# Patient Record
Sex: FEMALE | Race: WHITE | NOT HISPANIC OR LATINO | Employment: FULL TIME | URBAN - METROPOLITAN AREA
[De-identification: names, ages, dates, MRNs, and addresses within clinical notes are randomized per-mention and may not be internally consistent; named-entity substitution may affect disease eponyms.]

---

## 2018-07-17 ENCOUNTER — OFFICE VISIT (OUTPATIENT)
Dept: NEUROLOGY | Facility: CLINIC | Age: 58
End: 2018-07-17
Payer: COMMERCIAL

## 2018-07-17 ENCOUNTER — LAB VISIT (OUTPATIENT)
Dept: LAB | Facility: HOSPITAL | Age: 58
End: 2018-07-17
Attending: PSYCHIATRY & NEUROLOGY
Payer: COMMERCIAL

## 2018-07-17 VITALS
HEIGHT: 64 IN | SYSTOLIC BLOOD PRESSURE: 121 MMHG | WEIGHT: 127.44 LBS | DIASTOLIC BLOOD PRESSURE: 75 MMHG | HEART RATE: 69 BPM | BODY MASS INDEX: 21.76 KG/M2

## 2018-07-17 DIAGNOSIS — R20.0 NUMBNESS IN FEET: ICD-10-CM

## 2018-07-17 DIAGNOSIS — M79.2 PERIPHERAL NEUROPATHIC PAIN: ICD-10-CM

## 2018-07-17 DIAGNOSIS — M79.2 PERIPHERAL NEUROPATHIC PAIN: Primary | ICD-10-CM

## 2018-07-17 LAB
25(OH)D3+25(OH)D2 SERPL-MCNC: 38 NG/ML
CERULOPLASMIN SERPL-MCNC: 28 MG/DL
CRP SERPL-MCNC: 0.5 MG/L
ERYTHROCYTE [SEDIMENTATION RATE] IN BLOOD BY WESTERGREN METHOD: 13 MM/HR
FOLATE SERPL-MCNC: 16.5 NG/ML
HCYS SERPL-SCNC: 5.8 UMOL/L
T4 FREE SERPL-MCNC: 0.98 NG/DL
TSH SERPL DL<=0.005 MIU/L-ACNC: 0.94 UIU/ML

## 2018-07-17 PROCEDURE — 86618 LYME DISEASE ANTIBODY: CPT

## 2018-07-17 PROCEDURE — 84630 ASSAY OF ZINC: CPT

## 2018-07-17 PROCEDURE — 86334 IMMUNOFIX E-PHORESIS SERUM: CPT | Mod: 26,,, | Performed by: PATHOLOGY

## 2018-07-17 PROCEDURE — 84439 ASSAY OF FREE THYROXINE: CPT

## 2018-07-17 PROCEDURE — 85651 RBC SED RATE NONAUTOMATED: CPT

## 2018-07-17 PROCEDURE — 84255 ASSAY OF SELENIUM: CPT

## 2018-07-17 PROCEDURE — 82746 ASSAY OF FOLIC ACID SERUM: CPT

## 2018-07-17 PROCEDURE — 82525 ASSAY OF COPPER: CPT

## 2018-07-17 PROCEDURE — 84590 ASSAY OF VITAMIN A: CPT

## 2018-07-17 PROCEDURE — 84425 ASSAY OF VITAMIN B-1: CPT

## 2018-07-17 PROCEDURE — 84207 ASSAY OF VITAMIN B-6: CPT

## 2018-07-17 PROCEDURE — 84252 ASSAY OF VITAMIN B-2: CPT

## 2018-07-17 PROCEDURE — 99999 PR PBB SHADOW E&M-NEW PATIENT-LVL II: CPT | Mod: PBBFAC,,, | Performed by: PSYCHIATRY & NEUROLOGY

## 2018-07-17 PROCEDURE — 82607 VITAMIN B-12: CPT

## 2018-07-17 PROCEDURE — 86140 C-REACTIVE PROTEIN: CPT

## 2018-07-17 PROCEDURE — 36415 COLL VENOUS BLD VENIPUNCTURE: CPT

## 2018-07-17 PROCEDURE — 86592 SYPHILIS TEST NON-TREP QUAL: CPT

## 2018-07-17 PROCEDURE — 86334 IMMUNOFIX E-PHORESIS SERUM: CPT

## 2018-07-17 PROCEDURE — 86038 ANTINUCLEAR ANTIBODIES: CPT

## 2018-07-17 PROCEDURE — 82390 ASSAY OF CERULOPLASMIN: CPT

## 2018-07-17 PROCEDURE — 99205 OFFICE O/P NEW HI 60 MIN: CPT | Mod: S$GLB,,, | Performed by: PSYCHIATRY & NEUROLOGY

## 2018-07-17 PROCEDURE — 84165 PROTEIN E-PHORESIS SERUM: CPT | Mod: 26,,, | Performed by: PATHOLOGY

## 2018-07-17 PROCEDURE — 3008F BODY MASS INDEX DOCD: CPT | Mod: CPTII,S$GLB,, | Performed by: PSYCHIATRY & NEUROLOGY

## 2018-07-17 PROCEDURE — 84446 ASSAY OF VITAMIN E: CPT

## 2018-07-17 PROCEDURE — 84165 PROTEIN E-PHORESIS SERUM: CPT

## 2018-07-17 PROCEDURE — 83090 ASSAY OF HOMOCYSTEINE: CPT

## 2018-07-17 PROCEDURE — 82306 VITAMIN D 25 HYDROXY: CPT

## 2018-07-17 PROCEDURE — 84443 ASSAY THYROID STIM HORMONE: CPT

## 2018-07-17 RX ORDER — TEMAZEPAM 15 MG/1
15 CAPSULE ORAL NIGHTLY PRN
Refills: 2 | COMMUNITY
Start: 2018-05-20

## 2018-07-17 RX ORDER — CALCIUM CARBONATE 500(1250)
1 TABLET ORAL DAILY
COMMUNITY

## 2018-07-17 RX ORDER — ESTRADIOL 0.04 MG/D
1 FILM, EXTENDED RELEASE TRANSDERMAL
COMMUNITY
Start: 2018-04-28

## 2018-07-17 RX ORDER — AMOXICILLIN 500 MG
CAPSULE ORAL DAILY
COMMUNITY

## 2018-07-17 RX ORDER — MEDROXYPROGESTERONE ACETATE 5 MG/1
TABLET ORAL DAILY
COMMUNITY
Start: 2018-04-24

## 2018-07-17 RX ORDER — CHOLECALCIFEROL (VITAMIN D3) 25 MCG
1000 TABLET ORAL DAILY
COMMUNITY

## 2018-07-17 RX ORDER — PRAVASTATIN SODIUM 10 MG/1
10 TABLET ORAL
COMMUNITY
Start: 2018-04-23

## 2018-07-17 NOTE — PROGRESS NOTES
"NEUROLOGY CONSULT:  Referring provider: Self, Aaareferral    Date of service: 7/17/2018   11:22 AM   Patient name: Sincere Riggins  Patient MRN: [id number]    Chief Complaint/Reason for Consultation: numbness in feet      History of Present Illness:   Sincere Riggins is a 57 y.o. with HLD, insomnia, and peripheral neuropathy who is here for treatment and recommendations.  Developed numbness in feet 10 years ago. Initially told that she needed shoe inserts, which helped. Then about 6 years ago, she was told to see a podiatrist, who ordered EMG which demonstrated bilateral LE moderate peripheral polyneuropathy of a mixed motor and sensory type (by Dr. Adams of [facility], 7/2012) and rx'd GBP, which helped with abnormal sensation in toes which "felt like marbles."  Unfortunately, the abnormal sensations seemed to get worse over time in terms of sensation intensity but not in terms of location (still in feet). While sitting her feet would just go numb, thought it was due to compression injury when stitting, but then she noted numbness when standing. Feels like there's a little stocking on her feet to ankles. When walking, feels like walking on crates; and when anything scrapes top of her feet, she feels a painful abnormal tingling sensation.  No prior comprehensive w/u for peripheral neuropathy.   No similar sensations in hands/arms. When exercising or washing hair in shower, she has some proximal arm pain.  No weakness. No tripping/fall/difficulty climbing stairs.   No difficulty with breathing. Sometimes when swallowing, water>pills>food will get stuck. Will stop breathing, then 30 sec, will go down. More in last 6 months but not constant or consistent.   No B/B issues, including increased frequency.  Some insomnia after menopause, melatonin helps.    Review of Systems   All other systems reviewed and are negative.      PMH: HLD, insomnia, peripheral neuropathy    No past surgical history on file.    Current Outpatient " "Prescriptions on File Prior to Visit   Medication Sig Dispense Refill Last Dose    gabapentin (NEURONTIN) 300 MG capsule 2 daily 180 capsule 3 Taking     Also taking: MV, Calcium, CoQ10, brain health, fish oil, "heart vitamin",    Allergies:   Review of patient's allergies indicates:  No Known Allergies    Family History:   Mother: AD  Father: diabetes    Social history:   Occupation: administrative position at [employer]  Education: college  Tobacco: never  Alcohol: never  Illicit substances: denies  Living situation: , 3 children  Disabilities (ADL/IADL):       Examination:    /75   Pulse 69   Ht 5' 4" (1.626 m)   Wt 57.8 kg (127 lb 6.8 oz)   BMI 21.87 kg/m²   .  GENERAL: pleasant, NAD, WDWN. Appropriate mood and affect.   Mental Status: Awake, alert, fully oriented. Patient is a good historian and follows examination well. Normal language function. No neglect.   CRANIAL NERVES:  II: PERRLA. VFFTC.    III, IV, VI: Gaze conjugate, EOMI  V: Sensation intact and symmetric to light touch V1-V3  VII: Symmetric facial motor function bilaterally  VIII: Intact to finger rub bilaterally  IX: Palate elevates symmetrically  X: Normal cough  XI: Normal shoulder shrug bilaterally  XII: Tongue protrudes midline  MOTOR: No drift. Normal tone and bulk.   ? Delt Bi Tri WE WF FDI HF KE KF ADF APF   Right 5 5 5 5 5 5 5 5 5 5 5   Left 5 5 5 5 5 5 5 5 5 5 5   SENSATION:  Temp/Pinprick: decreased beyond distal 1/3 shin, worse below ankles  Vibration: decreased to ankles  Proprioception: Intact and symmetric in the bilateral upper and lower extremities  Romberg negative.  COORDINATION:  Finger to Nose Intact Bilaterally  Rapid alternating movements intact bilaterally  Heel to Shin intact bilaterally  DTRs:  ? Biceps Triceps Brachioradialis Knee Ankle   Right 2+ 2+ 2+ 2+ 1+   Left 2+ 2+ 2+ 2+ 1+   Plantar reflex downgoing bilaterally  Franco absent bilaterally  GAIT: normal base and stride. Normal " tandem. Able to walk on toes. Some difficulty with heel walk.    Data:   Laboratory: none available    CSF: n/a    Imaging: none available    Assessment: 57 y.o. with HLD, insomnia, and peripheral neuropathy of who is here for treatment and recommendations of worsening peripheral neuropathy. Unclear etiology at this time. Denies DM or signs thereof. Not sure of exact testing performed in past. Exam is concerning for a length-dependent loss in b/l LE.    Plan:   - peripheral neuropathy panel: spep/viola, homocysteine, zinc, vit E/B6/D/B2/B12/B1/A, selenium, folate, crp, esr, wayne, rpr, tsh, free t4, copper, ceruloplasmin, lyme  - discussed treatment options, including gabapentin, which she has had in past. Will hold on further dosing at this time until lab results return. Discussed that this will not hinder diagnosis.     RTC in 2 weeks      Thank you for allowing me to participate in the care of Sincere Riggins.    I spent more than 60 minutes with the patient during the visit.  More than half of the visit was spent counseling the patient about possible diagnosis, further w/u, and treatment..

## 2018-07-18 ENCOUNTER — DOCUMENTATION ONLY (OUTPATIENT)
Dept: NEUROLOGY | Facility: CLINIC | Age: 58
End: 2018-07-18

## 2018-07-18 LAB
ALBUMIN SERPL ELPH-MCNC: 4.55 G/DL
ALPHA1 GLOB SERPL ELPH-MCNC: 0.29 G/DL
ALPHA2 GLOB SERPL ELPH-MCNC: 0.7 G/DL
ANA SER QL IF: NORMAL
B-GLOBULIN SERPL ELPH-MCNC: 0.69 G/DL
GAMMA GLOB SERPL ELPH-MCNC: 0.78 G/DL
INTERPRETATION SERPL IFE-IMP: NORMAL
PATHOLOGIST INTERPRETATION IFE: NORMAL
PATHOLOGIST INTERPRETATION SPE: NORMAL
PROT SERPL-MCNC: 7 G/DL
RPR SER QL: NORMAL
VIT B12 SERPL-MCNC: 704 NG/L

## 2018-07-18 NOTE — PROGRESS NOTES
Fax received from [facility]     NCV Test; Placed in SS folder for review.

## 2018-07-19 LAB
B BURGDOR AB SER IA-ACNC: 0.02 INDEX VALUE
COPPER SERPL-MCNC: 1336 UG/L (ref 810–1990)
PYRIDOXAL SERPL-MCNC: 25 UG/L (ref 5–50)
ZINC SERPL-MCNC: 99 UG/DL (ref 60–130)

## 2018-07-20 LAB
A-TOCOPHEROL VIT E SERPL-MCNC: 1550 UG/DL (ref 500–1800)
SELENIUM SERPL-MCNC: 103 UG/L (ref 23–190)
VIT A SERPL-MCNC: 48 UG/DL (ref 38–106)
VIT B1 SERPL-MCNC: 62 UG/L (ref 38–122)
VIT B2 SERPL-MCNC: 7 MCG/L (ref 1–19)

## 2018-07-24 PROBLEM — E78.49 OTHER HYPERLIPIDEMIA: Status: ACTIVE | Noted: 2018-07-24

## 2018-08-10 ENCOUNTER — OFFICE VISIT (OUTPATIENT)
Dept: NEUROLOGY | Facility: CLINIC | Age: 58
End: 2018-08-10
Payer: COMMERCIAL

## 2018-08-10 VITALS
HEIGHT: 64 IN | DIASTOLIC BLOOD PRESSURE: 71 MMHG | BODY MASS INDEX: 22.15 KG/M2 | HEART RATE: 68 BPM | SYSTOLIC BLOOD PRESSURE: 117 MMHG | WEIGHT: 129.75 LBS

## 2018-08-10 DIAGNOSIS — R20.0 NUMBNESS AND TINGLING OF BOTH FEET: ICD-10-CM

## 2018-08-10 DIAGNOSIS — R20.2 NUMBNESS AND TINGLING IN BOTH HANDS: Primary | ICD-10-CM

## 2018-08-10 DIAGNOSIS — R20.2 NUMBNESS AND TINGLING OF BOTH FEET: ICD-10-CM

## 2018-08-10 DIAGNOSIS — R29.818 FOCAL NEUROLOGICAL DEFICIT: ICD-10-CM

## 2018-08-10 DIAGNOSIS — R20.0 NUMBNESS AND TINGLING IN BOTH HANDS: Primary | ICD-10-CM

## 2018-08-10 PROCEDURE — 3008F BODY MASS INDEX DOCD: CPT | Mod: CPTII,S$GLB,, | Performed by: PSYCHIATRY & NEUROLOGY

## 2018-08-10 PROCEDURE — 99213 OFFICE O/P EST LOW 20 MIN: CPT | Mod: S$GLB,,, | Performed by: PSYCHIATRY & NEUROLOGY

## 2018-08-10 PROCEDURE — 99999 PR PBB SHADOW E&M-EST. PATIENT-LVL III: CPT | Mod: PBBFAC,,, | Performed by: PSYCHIATRY & NEUROLOGY

## 2018-08-10 NOTE — PROGRESS NOTES
"Subjective:       Patient ID: Sincere Riggins is a 57 y.o. female who presents today for a routine clinic visit for neuropathy..      HPI, taken 7/17/18:  57 y.o. with HLD, insomnia, and peripheral neuropathy who is here for treatment and recommendations.  Developed numbness in feet 10 years ago. Initially told that she needed shoe inserts, which helped. Then about 6 years ago, she was told to see a podiatrist, who ordered EMG which demonstrated bilateral LE moderate peripheral polyneuropathy of a mixed motor and sensory type (by Dr. Adams of [facility], 7/2012) and rx'd GBP, which helped with abnormal sensation in toes which "felt like marbles."  Unfortunately, the abnormal sensations seemed to get worse over time in terms of sensation intensity but not in terms of location (still in feet). While sitting her feet would just go numb, thought it was due to compression injury when stitting, but then she noted numbness when standing. Feels like there's a little stocking on her feet to ankles. When walking, feels like walking on crates; and when anything scrapes top of her feet, she feels a painful abnormal tingling sensation.  No prior comprehensive w/u for peripheral neuropathy.   No similar sensations in hands/arms. When exercising or washing hair in shower, she has some proximal arm pain.  No weakness. No tripping/fall/difficulty climbing stairs.   No difficulty with breathing. Sometimes when swallowing, water>pills>food will get stuck. Will stop breathing, then 30 sec, will go down. More in last 6 months but not constant or consistent.   No B/B issues, including increased frequency.  Some insomnia after menopause, melatonin helps.    SUBJECTIVE/TODAY:  · Symptoms still come and go. Worse on left than right. Feet are worse in the evening. GBP helps, 300 mg bid. No other particular triggers.  · Still taking vit D 1000 units daily  · Discussed results of tests so far.    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   Taking    estradiol (VIVELLE-DOT) 0.0375 mg/24 hr    Taking    fish oil-omega-3 fatty acids 300-1,000 mg capsule Take by mouth once daily.   Taking    gabapentin (NEURONTIN) 300 MG capsule 2 daily 180 capsule 3 Taking    medroxyPROGESTERone (PROVERA) 5 MG tablet    Taking    multivitamin capsule Take 1 capsule by mouth once daily.   Taking    pravastatin (PRAVACHOL) 10 MG tablet    Taking    temazepam (RESTORIL) 15 mg Cap Take 15 mg by mouth nightly.  2 Taking    vitamin D 1000 units Tab Take 1,000 Units by mouth once daily.   Taking             Objective:        Neurologic Exam      MENTAL STATUS: grossly intact. Normal language, attention, and memory.   CRANIAL NERVE EXAM: Extraocular muscles are intact.  No facial asymmetry. tongue midline. Shoulder shrug normal b/l There is no dysarthria.   MOTOR EXAM: Strength is 5/5 in all groups in the lower extremities and upper extremities.   SENSORY EXAM: LT normal in BUE. Decreased in LE's from distal 1/3 shin downward  COORDINATION: No ataxia  GAIT: Narrow based and stable. Very cautious, wearing wedges      Imaging:     None       Labs:       No results found for: WBC, RBC, HGB, HCT, MCV, MCH, MCHC, RDW, PLT, MPV, GRAN, LYMPH, MONO, EOS, BASO, EOSINOPHIL, BASOPHIL    Chemistry    No results found for: NA, K, CL, CO2, BUN, CREATININE, GLU No results found for: CALCIUM, ALKPHOS, AST, ALT, BILITOT, ESTGFRAFRICA, EGFRNONAA     7/2018: negative/normal - spep/viola, homocysteine, zinc, vit E/B6/D/B2/B12/B1/A, selenium, folate, crp, esr, wayne, rpr, tsh, free t4, copper,  ceruloplasmin, lyme      Diagnosis/Assessment/Plan:    1. Paresthesias, symptoms wax and wane  · Assessment: previously diagnosed with peripheral neuropathy, but would like to eval for central causes given patient's report of symptoms waxing and waning, worse after exertion.  · Imaging: MRI brain and c-spine  · Labs: no further labs  · Consults None  · Medications None, can continue gabapentin and vit D.      RTC with me in 2 weeks    Over 50% of this 40 minute visit was spent in direct face to face counseling of the patient about diagnosis, further w/u, and possible symptom management.         There are no diagnoses linked to this encounter.

## 2018-09-06 ENCOUNTER — TELEPHONE (OUTPATIENT)
Dept: NEUROLOGY | Facility: CLINIC | Age: 58
End: 2018-09-06

## 2018-09-06 DIAGNOSIS — R20.0 NUMBNESS AND TINGLING OF BOTH FEET: ICD-10-CM

## 2018-09-06 DIAGNOSIS — R29.818 FOCAL NEUROLOGICAL DEFICIT: ICD-10-CM

## 2018-09-06 DIAGNOSIS — R20.0 NUMBNESS IN BOTH HANDS: Primary | ICD-10-CM

## 2018-09-06 DIAGNOSIS — R20.2 NUMBNESS AND TINGLING OF BOTH FEET: ICD-10-CM

## 2018-09-06 NOTE — TELEPHONE ENCOUNTER
Placed call to pt. She is unable to have MRI's at [facility] due to financial concerns. Pt requested orders be faxed to [facility] at .

## 2018-09-06 NOTE — TELEPHONE ENCOUNTER
----- Message from Jay Le sent at 9/6/2018  4:13 PM CDT -----  Contact: bcbs-   Needs Advice    Reason for call:     Would like a call regarding patient MRI, need orders faxed. States its urgent, her pre op auth need to be completed before next Tuesday.   Communication Preference: [phone]   Additional Information: fax

## 2018-09-12 ENCOUNTER — DOCUMENTATION ONLY (OUTPATIENT)
Dept: NEUROLOGY | Facility: CLINIC | Age: 58
End: 2018-09-12

## 2018-09-12 NOTE — PROGRESS NOTES
Fax received from [facility] with c spine and brain MRI reports (done 9/12/18). Placed in Dr Ruth' folder for review.

## 2018-09-13 NOTE — TELEPHONE ENCOUNTER
Called and spoke to patient about MRI results.    MRI cervical spine: C5/6 central disc herniation. C6/7 central and left lateral disc herniation with chronic cord compression. Bilateral stenosis with compromise of the C7 nerve roots, more prominent on the right than the left.    MRI Brain: normal study    Patient to make an appt online for next week.

## 2018-09-21 ENCOUNTER — OFFICE VISIT (OUTPATIENT)
Dept: NEUROLOGY | Facility: CLINIC | Age: 58
End: 2018-09-21
Payer: COMMERCIAL

## 2018-09-21 VITALS
SYSTOLIC BLOOD PRESSURE: 124 MMHG | DIASTOLIC BLOOD PRESSURE: 76 MMHG | WEIGHT: 128.88 LBS | HEART RATE: 66 BPM | BODY MASS INDEX: 22 KG/M2 | HEIGHT: 64 IN

## 2018-09-21 DIAGNOSIS — R20.0 NUMBNESS IN FEET: ICD-10-CM

## 2018-09-21 DIAGNOSIS — M79.2 PERIPHERAL NEUROPATHIC PAIN: Primary | ICD-10-CM

## 2018-09-21 DIAGNOSIS — M50.30 DDD (DEGENERATIVE DISC DISEASE), CERVICAL: ICD-10-CM

## 2018-09-21 DIAGNOSIS — M47.812 OSTEOARTHRITIS OF CERVICAL SPINE, UNSPECIFIED SPINAL OSTEOARTHRITIS COMPLICATION STATUS: ICD-10-CM

## 2018-09-21 PROCEDURE — 3008F BODY MASS INDEX DOCD: CPT | Mod: CPTII,S$GLB,, | Performed by: PSYCHIATRY & NEUROLOGY

## 2018-09-21 PROCEDURE — 99213 OFFICE O/P EST LOW 20 MIN: CPT | Mod: S$GLB,,, | Performed by: PSYCHIATRY & NEUROLOGY

## 2018-09-21 PROCEDURE — 99999 PR PBB SHADOW E&M-EST. PATIENT-LVL III: CPT | Mod: PBBFAC,,, | Performed by: PSYCHIATRY & NEUROLOGY

## 2018-09-21 NOTE — PROGRESS NOTES
"Subjective:       Patient ID: Sincere Riggins is a 58 y.o. female who presents today for a routine clinic visit for neuropathy.    HPI, taken 7/17/18:  57 y.o. with HLD, insomnia, and peripheral neuropathy who is here for treatment and recommendations.  Developed numbness in feet 10 years ago. Initially told that she needed shoe inserts, which helped. Then about 6 years ago, she was told to see a podiatrist, who ordered EMG which demonstrated bilateral LE moderate peripheral polyneuropathy of a mixed motor and sensory type (by Dr. Adams of [facility], 7/2012) and rx'd GBP, which helped with abnormal sensation in toes which "felt like marbles."  Unfortunately, the abnormal sensations seemed to get worse over time in terms of sensation intensity but not in terms of location (still in feet). While sitting her feet would just go numb, thought it was due to compression injury when stitting, but then she noted numbness when standing. Feels like there's a little stocking on her feet to ankles. When walking, feels like walking on crates; and when anything scrapes top of her feet, she feels a painful abnormal tingling sensation.  No prior comprehensive w/u for peripheral neuropathy.   No similar sensations in hands/arms. When exercising or washing hair in shower, she has some proximal arm pain.  No weakness. No tripping/fall/difficulty climbing stairs.   No difficulty with breathing. Sometimes when swallowing, water>pills>food will get stuck. Will stop breathing, then 30 sec, will go down. More in last 6 months but not constant or consistent.   No B/B issues, including increased frequency.  Some insomnia after menopause, melatonin helps.    SUBJECTIVE/TODAY:  · Symptoms still come and go. Worse on left than right. Feet are worse in the evening. GBP helps, 300 mg bid. No other particular triggers.   · Discussed lab and imaging results to date.  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   Taking    estradiol (VIVELLE-DOT) 0.0375 mg/24 hr    Taking    fish oil-omega-3 fatty acids 300-1,000 mg capsule Take by mouth once daily.   Taking    gabapentin (NEURONTIN) 300 MG capsule 2 daily 180 capsule 3 Taking    medroxyPROGESTERone (PROVERA) 5 MG tablet    Taking    multivitamin capsule Take 1 capsule by mouth once daily.   Taking    pravastatin (PRAVACHOL) 10 MG tablet    Taking    temazepam (RESTORIL) 15 mg Cap Take 15 mg by mouth nightly.  2 Taking    vitamin D 1000 units Tab Take 1,000 Units by mouth once daily.   Taking             Objective:        Neurologic Exam      MENTAL STATUS: grossly intact. Normal language, attention, and memory.   CRANIAL NERVE EXAM: Extraocular muscles are intact.  No facial asymmetry. tongue midline. Shoulder shrug normal b/l There is no dysarthria.   MOTOR EXAM: Strength is 5/5 in all groups in the lower extremities and upper extremities.   SENSORY EXAM: LT normal in BUE. Decreased in LE's from distal 1/3 shin downward  COORDINATION: No ataxia  GAIT: Narrow based and stable. Very cautious, wearing wedges      Imaging:     Independently reviewed  MRI brain unremarkable  MRI C-spine: degenerative changes. See report    Labs:       No results found for: WBC, RBC, HGB, HCT, MCV, MCH, MCHC, RDW, PLT, MPV, GRAN, LYMPH, MONO, EOS, BASO, EOSINOPHIL, BASOPHIL    Chemistry    No results found for: NA, K, CL, CO2, BUN, CREATININE, GLU No results found for: CALCIUM, ALKPHOS, AST, ALT, BILITOT, ESTGFRAFRICA, EGFRNONAA     7/2018: negative/normal -  spep/viola, homocysteine, zinc, vit E/B6/D/B2/B12/B1/A, selenium, folate, crp, esr, wayne, rpr, tsh, free t4, copper, ceruloplasmin, lyme      Diagnosis/Assessment/Plan:    1. Paresthesias, symptoms wax and wane  · Assessment: likely idiopathic. Considered small fiber neuropathy. Imaging negative for central source.  · Imaging: no further imaging   · Labs: no further labs  · Medications None, can continue gabapentin and vit D.    2. Cervical spine DDD  Back pain  Referral placed to back and spine clinic and PT      RTC with me in 2-3 months or sooner if needed.    Over 50% of this 40 minute visit was spent in direct face to face counseling of the patient about diagnosis, further w/u, and possible symptom management.         There are no diagnoses linked to this encounter.

## 2018-10-11 ENCOUNTER — TELEPHONE (OUTPATIENT)
Dept: SPINE | Facility: CLINIC | Age: 58
End: 2018-10-11

## 2018-10-11 ENCOUNTER — TELEPHONE (OUTPATIENT)
Dept: NEUROLOGY | Facility: CLINIC | Age: 58
End: 2018-10-11

## 2018-10-11 NOTE — TELEPHONE ENCOUNTER
Returned call.  Patient states that she is going to pick-up a second copy of disc and will bring it  to her appointment with FELY Smith.  Will get original disc from Dr. Ruth

## 2018-10-11 NOTE — TELEPHONE ENCOUNTER
----- Message from Sofi Smith PA-C sent at 10/11/2018  7:40 AM CDT -----  She has an appt with me on Monday. She had outside MRI of her cervical spine. Please be sure she brings CD to appointment. Report is scanned into chart.     Thanks.

## 2018-10-11 NOTE — PROGRESS NOTES
Subjective:      Patient ID: Sincere Riggins is a 58 y.o. female.    Chief Complaint: Neck Pain      HPI  ([place])    Referral from neurology ([place]).     History of hyperlipidemia and peripheral neuropathic pain.     She has numbness/tingling in both feet that is chronic and constant, left > right. Numbness feels like sock or stocking. Had MS workup that was negative. Has intermittent left thumb/index finger numbness. She has improvement with neurontin. She has minimal intermittent neck pain that she relates to stress. No arm pain. No weakness in her arms or legs. She rates her pain as a 0 on a scale of 1-10.     EMG on 7/17/12 was reviewed and shows moderate peripheral polyneuropathy.     Patient denies balance issues, changes in handwriting, problems with hand dexterity, and frequent dropping of items.    No PT, injections, or surgery on her neck.       Review of Systems   Constitution: Negative for fever, weakness, malaise/fatigue, night sweats, weight gain and weight loss.   HENT: Negative for hearing loss, nosebleeds and odynophagia.    Eyes: Negative for blurred vision and double vision.   Cardiovascular: Negative for chest pain, irregular heartbeat and palpitations.   Respiratory: Negative for cough, hemoptysis, shortness of breath and wheezing.    Endocrine: Negative for cold intolerance and polydipsia.   Hematologic/Lymphatic: Does not bruise/bleed easily.   Skin: Negative for dry skin, poor wound healing, rash and suspicious lesions.   Musculoskeletal: Positive for neck pain.        See HPI for pertinent positives.   Gastrointestinal: Positive for dysphagia. Negative for bloating, abdominal pain, constipation, diarrhea, hematochezia, melena, nausea and vomiting.   Genitourinary: Negative for bladder incontinence, dysuria, hematuria, hesitancy and incomplete emptying.   Neurological: Positive for numbness and paresthesias. Negative for disturbances in coordination, dizziness, focal weakness, headaches, loss of  balance and seizures.   Psychiatric/Behavioral: Negative for depression and hallucinations. The patient is not nervous/anxious.            Objective:        General: Sincere is well-developed, well-nourished, appears stated age, in no acute distress, alert and oriented to time, place and person.     General    Vitals reviewed.  Constitutional: She is oriented to person, place, and time. She appears well-developed and well-nourished.   Pulmonary/Chest: Effort normal.   Abdominal: She exhibits no distension.   Neurological: She is alert and oriented to person, place, and time.   Psychiatric: She has a normal mood and affect. Her behavior is normal. Judgment and thought content normal.           Gait: normal, Romberg shows sway without falling, tandem walking is normal and she can heel/toe stand.     On exam of the cervical spine, pt has minimal right trapezial tenderness, no posterior cervical tenderness.     Patient has good ROM of cervical spine without pain.     Skin in the cervical region is warm to the touch without visible rashes.     Strength Testing of Bilateral UEs shows  Right :  +5/5   Left :  +5/5  Right deltoid:  +5/5   Left deltoid:  +5/5  Right biceps:  +5/5   Left biceps:  +5/5  Right triceps:  +5/5   Left triceps:  +5/5  Right wrist extension:  +5/5  Left wrist extension:  +5/5  Right wrist flexion:  +5/5  Left wrist flexion:  +5/5  Right interosseus:  +5/5  Left interosseus:  +5/5    Sensation is grossly intact to light touch in C5, C6, C7, C8, T1 distribution.     Right shoulder has no pain with IR/ER. Good ROM of shoulder and no tenderness.   Left shoulder has no pain with IR/ER. Good ROM of shoulder and no tenderness.     negative clonus in bilateral LEs.    negative hoffmans bilateral UEs.    DTRs:  Right biceps:  +2     Left biceps:  +2   Right brachioradialis:  +2  Left brachioradialis:  +2    On gross exam of bilateral LEs, patient has full painfree ROM with no signs of clubbing, laxity,  cyanosis, edema, instability, and weakness.       XRAY INTERPRETATION:  MRI of cervical spine dated 9/11/18 and on outside CD is personally reviewed and shows cervical spondylosis with disc bulge C5-C6. Disc bulge C6-C7 with minimal bilateral foraminal and central stenosis.     CD returned to patient. Report scanned into chart.         Assessment:       1. Peripheral neuropathic pain    2. Foraminal stenosis of cervical region    3. Cervical spondylosis without myelopathy    4. Cervical stenosis of spinal canal           Plan:               Primary complaint of numbness/tingling in both feet that is chronic and constant, left > right. Numbness feels like sock or stocking. Had MS workup that was negative. No significant neck or arm pain. Intermittent left thumb/index finger numbness. Known cervical spondylosis with minimal bilateral foraminal and central stenosis C6-C7. Symptoms do not appear cervical mediated. Treatment options reviewed with patient along with above cervical MRI and EMG. Following plan made:     - LE numbness/tingling likely from polyneuropathy seen on EMG in 2012. This has improved with neurontin. She has some swaying with romberg on exam with no other myelopathic signs. She denies balance issues, changes in handwriting, problems with hand dexterity, and frequent dropping of items.  - Discussed symptoms of cervical myelopathy at length. She will call if these develop.   - No further treatment recommended as she has no neck or arm pain.   - May consider updated EMG/NCS if her LE symptoms get worse.     Follow-up: Follow-up if symptoms worsen or fail to improve. If there are any questions prior to this, the patient was instructed to contact the office.

## 2018-10-11 NOTE — TELEPHONE ENCOUNTER
Call received from pt. She must bring her MRI CD to appt with Sofi Smith on Monday. She would like to get her CD back.

## 2018-10-11 NOTE — TELEPHONE ENCOUNTER
Attempted to confirm appointment with patient without success.  No answer from patient.  Message left on voicemail with detailed appointment information along with office call back number.  TERRY

## 2018-10-15 ENCOUNTER — OFFICE VISIT (OUTPATIENT)
Dept: SPINE | Facility: CLINIC | Age: 58
End: 2018-10-15
Payer: COMMERCIAL

## 2018-10-15 VITALS
DIASTOLIC BLOOD PRESSURE: 57 MMHG | BODY MASS INDEX: 21.85 KG/M2 | WEIGHT: 128 LBS | SYSTOLIC BLOOD PRESSURE: 117 MMHG | HEART RATE: 70 BPM | HEIGHT: 64 IN

## 2018-10-15 DIAGNOSIS — M47.812 CERVICAL SPONDYLOSIS WITHOUT MYELOPATHY: ICD-10-CM

## 2018-10-15 DIAGNOSIS — M79.2 PERIPHERAL NEUROPATHIC PAIN: Primary | ICD-10-CM

## 2018-10-15 DIAGNOSIS — M48.02 FORAMINAL STENOSIS OF CERVICAL REGION: ICD-10-CM

## 2018-10-15 DIAGNOSIS — M48.02 CERVICAL STENOSIS OF SPINAL CANAL: ICD-10-CM

## 2018-10-15 PROCEDURE — 99999 PR PBB SHADOW E&M-EST. PATIENT-LVL III: CPT | Mod: PBBFAC,,, | Performed by: PHYSICIAN ASSISTANT

## 2018-10-15 PROCEDURE — 3008F BODY MASS INDEX DOCD: CPT | Mod: CPTII,S$GLB,, | Performed by: PHYSICIAN ASSISTANT

## 2018-10-15 PROCEDURE — 99203 OFFICE O/P NEW LOW 30 MIN: CPT | Mod: S$GLB,,, | Performed by: PHYSICIAN ASSISTANT

## 2018-10-15 NOTE — LETTER
October 16, 2018      Nicole Ruth MD  [address]  [facility] [address]           [facility]  [address], [address]  [facility] [address]  Phone: [phone]  Fax: [phone]          Patient: Sincere Riggins   MR Number: [id number]   YOB: 1960   Date of Visit: 10/15/2018       Dear Dr. Nicole Ruth:    Thank you for referring Sincere Riggins to me for evaluation. Attached you will find relevant portions of my assessment and plan of care.    If you have questions, please do not hesitate to call me. I look forward to following Sincere Riggins along with you.    Sincerely,    Sofi Smith PA-C    Enclosure  CC:  No Recipients    If you would like to receive this communication electronically, please contact [email or web address] or [phone] to request more information on [facility] access.    For providers and/or their staff who would like to refer a patient to [facility], please contact us through our one-stop-shop provider referral line, [facility], at [phone].    If you feel you have received this communication in error or would no longer like to receive these types of communications, please e-mail [email or web address]

## 2019-04-28 NOTE — PROGRESS NOTES
Subjective:      Patient ID: Sincere Riggins is a 58 y.o. female.    Chief Complaint: Numbness and Hand Pain      HPI  (Celestre    History of hyperlipidemia and peripheral neuropathic pain.     Seen previously for numbness/tingling in both feet that is chronic and constant, left > right. Numbness feels like sock or stocking. Had MS workup that was negative. No significant neck or arm pain at hat time. She had intermittent left thumb/index finger numbness. Known cervical spondylosis with minimal bilateral foraminal and central stenosis C6-C7 per MRI 9/11/18.     She has 6 day history of constant numbness in her small and ring finger with weakness. No new injury. No neck or arm pain. She is right hand dominant. She is on neurontin 300 in the morning and 600-900mg at night- this has helped her feet but not her left hand. No alleviating or aggravating factors. No known injury. No new treatment for her neck- no PT, injections, or surgery.     Patient denies balance issues, changes in handwriting. She notes recent problems with hand dexterity and frequent dropping of items since she's had numbness.       Review of Systems   Constitution: Negative for chills, fever, night sweats and weight gain.   Gastrointestinal: Negative for bowel incontinence, nausea and vomiting.   Genitourinary: Negative for bladder incontinence.   Neurological: Negative for disturbances in coordination and loss of balance.           Objective:        General: Sincere is well-developed, well-nourished, appears stated age, in no acute distress, alert and oriented to time, place and person.     Ortho/SPM Exam     Gait: normal, Romberg shows sway without falling, tandem walking is normal and she can heel/toe stand.     On exam of the cervical spine, pt has no posterior cervical tenderness.     Patient has good ROM of cervical spine without pain.     Skin in the cervical region is warm to the touch without visible rashes.     Strength Testing of Bilateral UEs  shows  Right :  +5/5   Left :  5-/5  Right deltoid:  +5/5   Left deltoid:  5/5  Right biceps:  +5/5   Left biceps:  5/5  Right triceps:  +5/5   Left triceps:  5/5  Right wrist extension:  +5/5  Left wrist extension:  5/5  Right wrist flexion:  +5/5  Left wrist flexion:  5/5  Right interosseus:  +5/5  Left interosseus:  4/5    Sensation is grossly intact to light touch in C5, C6, C7, C8, T1 distribution.     Right shoulder has no pain with IR/ER. Good ROM of shoulder and no tenderness.   Left shoulder has no pain with IR/ER. Good ROM of shoulder and no tenderness.     negative clonus in bilateral LEs.    negative hoffmans bilateral UEs.    DTRs:  Right biceps:  +2     Left biceps:  +2   Right brachioradialis:  +2  Left brachioradialis:  +2        Assessment:       1. Numbness and tingling in left hand    2. Foraminal stenosis of cervical region    3. Cervical spondylosis without myelopathy    4. Cervical stenosis of spinal canal    5. Left hand weakness           Plan:       Orders Placed This Encounter    EMG W/ ULTRASOUND AND NERVE CONDUCTION TEST 2 Extremities    methylPREDNISolone (MEDROL DOSEPACK) 4 mg tablet        She has 6 day history of constant numbness in her small and ring finger with weakness. No new injury. No neck or arm pain. Symptoms are more suspicious for peripheral neuropathy rather than cervical radiculopathy. She says with romberg, no other signs of myelopathy. Issues with dexterity and dropping things along with weakness have only been since numbness started 6 days ago. She continues with numbness/tingling in both feet that is chronic and constant, left > right as well.     Known cervical spondylosis with minimal bilateral foraminal and central stenosis C6-C7 per MRI 9/11/18. Treatment options reviewed with patient and following plan made:     - Medrol dose pack for symptom relief. Reviewed dosing and side effects.   - Continue neurontin.   - Set up for EMG/NCS for bilateral UEs. This  will not be scheduled for 3-4 weeks at minimum (symptoms only started 6 days ago).   - Will email her next week to check on her.     Follow-up: Follow up if symptoms worsen or fail to improve. If there are any questions prior to this, the patient was instructed to contact the office.

## 2019-04-30 ENCOUNTER — OFFICE VISIT (OUTPATIENT)
Dept: SPINE | Facility: CLINIC | Age: 59
End: 2019-04-30
Payer: COMMERCIAL

## 2019-04-30 VITALS
BODY MASS INDEX: 21.85 KG/M2 | HEIGHT: 64 IN | HEART RATE: 74 BPM | DIASTOLIC BLOOD PRESSURE: 80 MMHG | SYSTOLIC BLOOD PRESSURE: 139 MMHG | WEIGHT: 128 LBS

## 2019-04-30 DIAGNOSIS — M48.02 FORAMINAL STENOSIS OF CERVICAL REGION: ICD-10-CM

## 2019-04-30 DIAGNOSIS — M48.02 CERVICAL STENOSIS OF SPINAL CANAL: ICD-10-CM

## 2019-04-30 DIAGNOSIS — R20.2 NUMBNESS AND TINGLING IN LEFT HAND: Primary | ICD-10-CM

## 2019-04-30 DIAGNOSIS — R20.0 NUMBNESS AND TINGLING IN LEFT HAND: Primary | ICD-10-CM

## 2019-04-30 DIAGNOSIS — M47.812 CERVICAL SPONDYLOSIS WITHOUT MYELOPATHY: ICD-10-CM

## 2019-04-30 DIAGNOSIS — R29.898 LEFT HAND WEAKNESS: ICD-10-CM

## 2019-04-30 PROCEDURE — 99214 PR OFFICE/OUTPT VISIT, EST, LEVL IV, 30-39 MIN: ICD-10-PCS | Mod: S$GLB,,, | Performed by: PHYSICIAN ASSISTANT

## 2019-04-30 PROCEDURE — 3008F PR BODY MASS INDEX (BMI) DOCUMENTED: ICD-10-PCS | Mod: CPTII,S$GLB,, | Performed by: PHYSICIAN ASSISTANT

## 2019-04-30 PROCEDURE — 99999 PR PBB SHADOW E&M-EST. PATIENT-LVL IV: ICD-10-PCS | Mod: PBBFAC,,, | Performed by: PHYSICIAN ASSISTANT

## 2019-04-30 PROCEDURE — 99214 OFFICE O/P EST MOD 30 MIN: CPT | Mod: S$GLB,,, | Performed by: PHYSICIAN ASSISTANT

## 2019-04-30 PROCEDURE — 3008F BODY MASS INDEX DOCD: CPT | Mod: CPTII,S$GLB,, | Performed by: PHYSICIAN ASSISTANT

## 2019-04-30 PROCEDURE — 99999 PR PBB SHADOW E&M-EST. PATIENT-LVL IV: CPT | Mod: PBBFAC,,, | Performed by: PHYSICIAN ASSISTANT

## 2019-04-30 RX ORDER — METHYLPREDNISOLONE 4 MG/1
TABLET ORAL
Qty: 1 PACKAGE | Refills: 0 | Status: SHIPPED | OUTPATIENT
Start: 2019-04-30 | End: 2019-06-06

## 2019-05-01 ENCOUNTER — TELEPHONE (OUTPATIENT)
Dept: NEUROLOGY | Facility: CLINIC | Age: 59
End: 2019-05-01

## 2019-05-01 NOTE — TELEPHONE ENCOUNTER
----- Message from Magui Rankin LPN sent at 4/30/2019  2:49 PM CDT -----  Good afternoon,    ROBERTO Greene with [facility] would like patient to be scheduled for EMG W/ ULTRASOUND AND NERVE CONDUCTION TEST. Please call patient to schedule.    Thanks,  Magui Rankin LPN

## 2019-05-05 ENCOUNTER — PATIENT MESSAGE (OUTPATIENT)
Dept: SPINE | Facility: CLINIC | Age: 59
End: 2019-05-05

## 2019-05-05 DIAGNOSIS — M47.812 CERVICAL SPONDYLOSIS WITHOUT MYELOPATHY: ICD-10-CM

## 2019-05-05 DIAGNOSIS — M48.02 FORAMINAL STENOSIS OF CERVICAL REGION: ICD-10-CM

## 2019-05-05 DIAGNOSIS — R29.898 LEFT HAND WEAKNESS: Primary | ICD-10-CM

## 2019-05-05 DIAGNOSIS — R20.2 NUMBNESS AND TINGLING IN LEFT HAND: ICD-10-CM

## 2019-05-05 DIAGNOSIS — R20.0 NUMBNESS AND TINGLING IN LEFT HAND: ICD-10-CM

## 2019-05-05 DIAGNOSIS — M48.02 CERVICAL STENOSIS OF SPINAL CANAL: ICD-10-CM

## 2019-05-05 DIAGNOSIS — M79.642 LEFT HAND PAIN: ICD-10-CM

## 2019-05-06 ENCOUNTER — TELEPHONE (OUTPATIENT)
Dept: SPINE | Facility: CLINIC | Age: 59
End: 2019-05-06

## 2019-05-06 NOTE — TELEPHONE ENCOUNTER
Please set her up to see either Enrrique upstairs or one of her PAs, call patient with appointment.     She also was supposed to get EMG set up at her last visit with me, is this being done?     Thanks.

## 2019-05-06 NOTE — TELEPHONE ENCOUNTER
Sofi Denton PA with [facility] would like patient to be scheduled for EMG W/ ULTRASOUND AND NERVE CONDUCTION TEST. Please call patient to schedule.    Thanks Gabriela

## 2019-05-08 DIAGNOSIS — R52 PAIN: Primary | ICD-10-CM

## 2019-05-10 ENCOUNTER — OFFICE VISIT (OUTPATIENT)
Dept: ORTHOPEDICS | Facility: CLINIC | Age: 59
End: 2019-05-10
Payer: COMMERCIAL

## 2019-05-10 ENCOUNTER — HOSPITAL ENCOUNTER (OUTPATIENT)
Dept: RADIOLOGY | Facility: OTHER | Age: 59
Discharge: HOME | End: 2019-05-10
Attending: PLASTIC SURGERY
Payer: COMMERCIAL

## 2019-05-10 VITALS
DIASTOLIC BLOOD PRESSURE: 79 MMHG | SYSTOLIC BLOOD PRESSURE: 148 MMHG | BODY MASS INDEX: 21.85 KG/M2 | WEIGHT: 128 LBS | HEART RATE: 64 BPM | HEIGHT: 64 IN

## 2019-05-10 DIAGNOSIS — G56.22 ULNAR NERVE COMPRESSION, LEFT: Primary | ICD-10-CM

## 2019-05-10 DIAGNOSIS — R52 PAIN: ICD-10-CM

## 2019-05-10 PROCEDURE — 73130 XR HAND COMPLETE 3 VIEW LEFT: ICD-10-PCS | Mod: 26,LT,, | Performed by: RADIOLOGY

## 2019-05-10 PROCEDURE — 99203 OFFICE O/P NEW LOW 30 MIN: CPT | Mod: S$GLB,,, | Performed by: PLASTIC SURGERY

## 2019-05-10 PROCEDURE — 99203 PR OFFICE/OUTPT VISIT, NEW, LEVL III, 30-44 MIN: ICD-10-PCS | Mod: S$GLB,,, | Performed by: PLASTIC SURGERY

## 2019-05-10 PROCEDURE — 3008F PR BODY MASS INDEX (BMI) DOCUMENTED: ICD-10-PCS | Mod: CPTII,S$GLB,, | Performed by: PLASTIC SURGERY

## 2019-05-10 PROCEDURE — 99999 PR PBB SHADOW E&M-EST. PATIENT-LVL III: ICD-10-PCS | Mod: PBBFAC,,, | Performed by: PLASTIC SURGERY

## 2019-05-10 PROCEDURE — 73130 X-RAY EXAM OF HAND: CPT | Mod: TC,FY,LT

## 2019-05-10 PROCEDURE — 3008F BODY MASS INDEX DOCD: CPT | Mod: CPTII,S$GLB,, | Performed by: PLASTIC SURGERY

## 2019-05-10 PROCEDURE — 73130 X-RAY EXAM OF HAND: CPT | Mod: 26,LT,, | Performed by: RADIOLOGY

## 2019-05-10 PROCEDURE — 99999 PR PBB SHADOW E&M-EST. PATIENT-LVL III: CPT | Mod: PBBFAC,,, | Performed by: PLASTIC SURGERY

## 2019-05-10 NOTE — LETTER
May 10, 2019      Sofi Smith PA-C  [address]  [facility] [address]           [facility] [place] [facility] [place] [address] [facility] [address]  [facility], [address]  [facility] [address]  Phone: [phone]          Patient: Sincere Riggins   MR Number: [id number]   YOB: 1960   Date of Visit: 5/10/2019       Dear Sofi Smith:    Thank you for referring Sincere Riggins to me for evaluation. Attached you will find relevant portions of my assessment and plan of care.    If you have questions, please do not hesitate to call me. I look forward to following Sincere Riggins along with you.    Sincerely,    Vadim Harp Jr., MD    Enclosure  CC:  No Recipients    If you would like to receive this communication electronically, please contact [email or web address] or [phone] to request more information on [facility] access.    For providers and/or their staff who would like to refer a patient to [facility], please contact us through our one-stop-shop provider referral line, [facility] [place], at [phone].    If you feel you have received this communication in error or would no longer like to receive these types of communications, please e-mail [email or web address]

## 2019-05-10 NOTE — PROGRESS NOTES
Chief Complaint: Pain and Numbness of the Left Hand      HPI:    Sincere Riggins is a right hand dominant 58 y.o. female presenting today for evaluation of left upper extremity weakness.  The patient denies any recent history of trauma she does have a history of cervical stenosis.  She states that 3 weeks ago she suddenly woke up with numbness and tingling within the right ring and small digits. She has also noticed that she has begun to drop things throughout the day.  She states the numbness and tingling is nearly constant.  An EMG has been ordered however the she has not had the test performed at this time    History reviewed. No pertinent past medical history.    History reviewed. No pertinent surgical history.     Family History   Problem Relation Age of Onset    Alzheimer's disease Mother     Diabetes Father     No Known Problems Maternal Grandmother     Leukemia Maternal Grandfather     Heart disease Paternal Grandmother     Emphysema Paternal Grandfather        Social History     Socioeconomic History    Marital status:      Spouse name: Not on file    Number of children: Not on file    Years of education: Not on file    Highest education level: Not on file   Occupational History    Not on file   Social Needs    Financial resource strain: Not on file    Food insecurity:     Worry: Not on file     Inability: Not on file    Transportation needs:     Medical: Not on file     Non-medical: Not on file   Tobacco Use    Smoking status: Never Smoker    Smokeless tobacco: Never Used   Substance and Sexual Activity    Alcohol use: Not on file    Drug use: Not on file    Sexual activity: Not on file   Lifestyle    Physical activity:     Days per week: Not on file     Minutes per session: Not on file    Stress: Not on file   Relationships    Social connections:     Talks on phone: Not on file     Gets together: Not on file     Attends L[employer]-day service: Not on file     Active member of club or  "organization: Not on file     Attends meetings of clubs or organizations: Not on file     Relationship status: Not on file   Other Topics Concern    Not on file   Social History Narrative    Not on file       Review of patient's allergies indicates:  No Known Allergies      Current Outpatient Medications:     calcium carbonate (OS-BRITTANY) 500 mg calcium (1,250 mg) tablet, Take 1 tablet by mouth once daily., Disp: , Rfl:     estradiol (VIVELLE-DOT) 0.0375 mg/24 hr, , Disp: , Rfl:     fish oil-omega-3 fatty acids 300-1,000 mg capsule, Take by mouth once daily., Disp: , Rfl:     gabapentin (NEURONTIN) 300 MG capsule, 2 daily, Disp: 180 capsule, Rfl: 3    medroxyPROGESTERone (PROVERA) 5 MG tablet, , Disp: , Rfl:     methylPREDNISolone (MEDROL DOSEPACK) 4 mg tablet, use as directed x 6 days, Disp: 1 Package, Rfl: 0    multivitamin capsule, Take 1 capsule by mouth once daily., Disp: , Rfl:     pravastatin (PRAVACHOL) 10 MG tablet, , Disp: , Rfl:     temazepam (RESTORIL) 15 mg Cap, Take 15 mg by mouth nightly., Disp: , Rfl: 2    vitamin D 1000 units Tab, Take 1,000 Units by mouth once daily., Disp: , Rfl:         Review of Systems:  Constitutional: no fever or chills  ENT: no nasal congestion or sore throat  Respiratory: no cough or shortness of breath  Cardiovascular: no chest pain or palpitations  Gastrointestinal: no nausea or vomiting, PUD, GERD, NSAID intolerance  Genitourinary: no hematuria or dysuria  Integument/Breast: no rash or pruritis  Hematologic/Lymphatic: no easy bruising or lymphadenopathy  Musculoskeletal: see HPI  Neurological: no seizures or tremors  Behavioral/Psych: no auditory or visual hallucinations      Objective:      PHYSICAL EXAM:  Vitals:    05/10/19 1300   BP: (!) 148/79   Pulse: 64   Weight: 58.1 kg (128 lb)   Height: 5' 4" (1.626 m)   PainSc:   7     General Appearance: WDWN, NAD  Neuro/Psych: Mood & affect appropriate  Lungs: Respirations equal and unlabored.   CV: No apparent CV " dysfunction, distal pulses intact, RRR  Skin: Intact throughout  Extremities:   Left Hand Exam     Tenderness   The patient is experiencing no tenderness.     Range of Motion   The patient has normal left wrist ROM.    Muscle Strength   :  4/5     Other   Sensation: decreased (Decreased in the ulnar distribution normal sensation in the median and radial distribution)  Pulse: present    Comments:  Weak FDI muscle weak intrinsics positive Tinel's over the Guyon's canal      Left Elbow Exam     Tenderness   The patient is experiencing no tenderness.     Range of Motion   The patient has normal left elbow ROM.    Tests   Tinel's sign (cubital tunnel): positive              DIAGNOSTICS/IMAGING:    Radiologist's Impression:     EXAMINATION:  XR HAND COMPLETE 3 VIEW LEFT    CLINICAL HISTORY:  . Pain, unspecified    TECHNIQUE:  PA, lateral, and oblique views of the left hand were performed.    COMPARISON:  None    FINDINGS:  Bones are well mineralized.  Alignment is satisfactory and joint spaces are well maintained.  No fracture, dislocation, or osseous destruction.  No significant degenerative changes.  No soft tissue abnormality appreciated.      Impression       Normal findings         Comments: I have personnaly reviewed the imaging and I agree with the above radiologist's report.          Assessment:     Encounter Diagnosis   Name Primary?    Ulnar nerve compression, left Yes          Plan/Discussion:   Sincere was seen today for pain and numbness.    Diagnoses and all orders for this visit:    Ulnar nerve compression, left     Based on exam and history to patient appears to have a compression of her ulnar nerve and possibly at multiple levels.  She has a positive Tinel's over the cubital tunnel and Guyon's canal. I am concerned about her weak intrinsics as this may be longstanding issue.  I agree with EMG prior to surgery we will attempt to have this scheduled as soon as possible.  I would like to see the patient  back after the EMG is performed. She was advised to keep the elbow in a extended position throughout the day and while sleeping.  The patient understood this and all questions and concerns were addressed prior to discharge

## 2019-05-15 ENCOUNTER — TELEPHONE (OUTPATIENT)
Dept: ORTHOPEDICS | Facility: CLINIC | Age: 59
End: 2019-05-15

## 2019-05-28 ENCOUNTER — TELEPHONE (OUTPATIENT)
Dept: ORTHOPEDICS | Facility: CLINIC | Age: 59
End: 2019-05-28

## 2019-05-28 NOTE — TELEPHONE ENCOUNTER
Spoke to patient in regards to her questions. I discussed them with her and informed her that I would send a message over to Dr. Harp to get a little bit more clarification. She is aware that I will call back once I hear back from him.

## 2019-05-28 NOTE — TELEPHONE ENCOUNTER
----- Message from Domenico Wong sent at 5/28/2019  8:35 AM CDT -----  Type: Needs Medical Advice    Who Called:  Patient  Best Call Back Number: [phone] (home)   Additional Information: Would like to speak to the office regarding a few things.

## 2019-05-29 ENCOUNTER — PROCEDURE VISIT (OUTPATIENT)
Dept: NEUROLOGY | Facility: CLINIC | Age: 59
End: 2019-05-29
Payer: COMMERCIAL

## 2019-05-29 VITALS
HEART RATE: 66 BPM | WEIGHT: 126.56 LBS | BODY MASS INDEX: 21.61 KG/M2 | HEIGHT: 64 IN | SYSTOLIC BLOOD PRESSURE: 146 MMHG | DIASTOLIC BLOOD PRESSURE: 69 MMHG

## 2019-05-29 DIAGNOSIS — R29.898 LEFT HAND WEAKNESS: ICD-10-CM

## 2019-05-29 DIAGNOSIS — R20.0 NUMBNESS AND TINGLING IN LEFT HAND: ICD-10-CM

## 2019-05-29 DIAGNOSIS — R20.2 NUMBNESS AND TINGLING IN LEFT HAND: ICD-10-CM

## 2019-05-29 DIAGNOSIS — G56.22 CUBITAL TUNNEL SYNDROME ON LEFT: Primary | ICD-10-CM

## 2019-05-29 DIAGNOSIS — G56.03 CARPAL TUNNEL SYNDROME, BILATERAL: ICD-10-CM

## 2019-05-29 PROCEDURE — 95886 MUSC TEST DONE W/N TEST COMP: CPT | Mod: S$GLB,,, | Performed by: PHYSICAL MEDICINE & REHABILITATION

## 2019-05-29 PROCEDURE — 95910 PR NERVE CONDUCTION STUDY; 7-8 STUDIES: ICD-10-PCS | Mod: S$GLB,,, | Performed by: PHYSICAL MEDICINE & REHABILITATION

## 2019-05-29 PROCEDURE — 95886 PR EMG COMPLETE, W/ NERVE CONDUCTION STUDIES, 5+ MUSCLES: ICD-10-PCS | Mod: S$GLB,,, | Performed by: PHYSICAL MEDICINE & REHABILITATION

## 2019-05-29 PROCEDURE — 95910 NRV CNDJ TEST 7-8 STUDIES: CPT | Mod: S$GLB,,, | Performed by: PHYSICAL MEDICINE & REHABILITATION

## 2019-05-29 NOTE — PROCEDURES
[facility]  [address] [place] [address]  Nerve Conduction & EMG Report      Patient: Sincere Riggins YOB: 1960  Sex: Female Age: 58 Years 9 Months        Reason for exam: Sincere Riggins is a 58 y.o. with a roughly 5 week history of left 4th./5th digit numbness, tingling and dropping things from the left hand.  This began upon wakening.      Physical Exam: Decreased sensation in the left 5th and ulnar half of the 4th digits.  Strength is weak with FDI and ADM.  FCU appears to be full strength. There is no medial elbow tenderness on exam.        Sensory NCS      Nerve / Sites Rec. Site Onset Lat Peak Lat Ref. NP Amp Ref. PP Amp Ref. SPAR Ref. Segments Distance Onset Collin Temp.     ms ms ms µV µV µV µV % %  cm m/s °F   L MEDIAN - Index finger      Wrist Index 3.07 4.01 ?3.40 29.4 ?15.0 18.1 ?20.0 100 ?40.00 Wrist - Index 13 42.3 73.5   R MEDIAN - Index finger      Wrist Index 3.49 4.27 ?3.40 23.2 ?15.0 21.3 ?20.0 79 ?40.00 Wrist - Index 13 37.3 74.9   L ULNAR - Dig V      Wrist Dig V 5.52 6.93 ?3.10 9.4 ?10.0 5.5 ?15.0 51.7 ?40.00 Wrist - Dig V 11 19.9 74.2   R ULNAR - Dig V      Wrist Dig V 2.45 3.20 ?3.20 18.2 ?10.0 15.8 ?15.0 100 ?40.00 Wrist - Dig V 11 44.9 74.9   L RADIAL - Thumb      Forearm Thumb 1.46 2.24 ?2.90 26.0 ?5.0 89.3 ?7.0  ?40.00 Forearm - Thumb 10 68.6 74.4                 Motor NCS      Nerve / Sites Rec. Site Lat Ref. Amp Ref. Seq Amp Ref. SPAR Ref. Segments Dist. Collin Ref. Temp     ms ms mV mV % % % %  cm m/s m/s °F   L MEDIAN - APB      Wrist APB 4.84 ?4.40 7.0 ?4.0 100  100 ?50.00 Wrist - APB 7   72.8      Elbow APB 10.47  2.6  36.9 ?70 68.1  Elbow - Wrist 20 36 ?49 72.8   R MEDIAN - APB      Wrist APB 5.05 ?4.40 6.2 ?4.0 100  89.6 ?50.00 Wrist - APB 7   74.9      Elbow APB 10.36  3.8  60.5 ?70 100  Elbow - Wrist 20 38 ?49 74.9   L ULNAR - ADM      Wrist ADM 4.17 ?3.60 1.7 ?5.0 100  24 ?50.00 Wrist - ADM 7   72.8      B.Elbow ADM 8.54  2.0  116 ?115 36   B.Elbow - Wrist 20 46 ?49       A.Elbow ADM 13.39  0.7  37.3 ?70 11.8  A.Elbow - B.Elbow 14 29 ?49 72.8   R ULNAR - ADM      Wrist ADM 3.28 ?3.60 7.1 ?5.0 100  100 ?50.00 Wrist - ADM 7   74.9      B.Elbow ADM 7.19  5.5  77.6 ?70 100  B.Elbow - Wrist 19 49 ?49 74.6      A.Elbow ADM 9.27  6.3  114 ?115 100  A.Elbow - B.Elbow 14 67 ?49                EMG Summary Table     Spontaneous MUAP Recruitment    IA Fib PSW Fasc H.F. Amp Dur. PPP Pattern   BICEPS N None None None None N N N N   PRON TERES N None None None None N N N N   FLEX CARPI ULN N None None None None N N N N   FIRST D INTEROSS Y None 1+ None None N N N N   ABD DIG MIN (UL) N None None None None N N N N   ABD POLL BREVIS N None None None None N N N N       Summary    The motor conduction test had results outside of the specified normal range 3 of 4 of the tested nerves:   In the L MEDIAN - APB study  o the take off latency result was increased for Wrist stimulation  o the take off velocity result was reduced for Elbow-Wrist segment   In the R MEDIAN - APB study  o the take off latency result was increased for Wrist stimulation  o the take off velocity result was reduced for Elbow-Wrist segment   In the L ULNAR - ADM study  o the take off latency result was increased for Wrist stimulation  o the peak amplitude result was reduced for Wrist stimulation  o the take off velocity result was reduced for B.Elbow-Wrist segment  o the take off velocity result was reduced for A.Elbow-B.Elbow segment    The sensory conduction test was performed on 5 nerve(s). The results were normal in 2 nerve(s): L RADIAL - Thumb and Right Ulnar Dig V. Results outside the specified normal range were found in 3 nerve(s), as follows:   In the L MEDIAN - Index finger study  o the peak latency result was increased for Wrist stimulation   In the R MEDIAN - Index finger study  o the peak latency result was increased for Wrist stimulation   In the L ULNAR - Dig V study  o the peak  latency result was increased for Wrist stimulation  o the peak amplitude result was reduced for Wrist stimulation    The needle EMG study was normal 5 of 6 tested muscles: BICEPS, PRON TERES, FLEX CARPI ULN, ABD DIG MIN (UL), ABD POLL BREVIS.  The left FDI showed PSW, but normal recruitment and MUAP morphology/size.          Conclusion:  1. 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  There is axonal loss and a conduction block across the elbow of about 65%.  There is active denervation in the FDI muscle.  2. There is also evidence of a bilateral sensorimotor median mononeuropathy across the wrists, such as would be seen in carpal tunnel syndrome.  There is no motor axonal loss and no active denervation on the left.  This is graded as moderate bilaterally.  She does not have any symptoms of carpal tunnel syndrome on either side, and so I do not believe this to be the cause of her current symptoms.        - - - - - - - - - - - - - - - - -   Fredrick Joyce, DO

## 2019-05-31 ENCOUNTER — OFFICE VISIT (OUTPATIENT)
Dept: ORTHOPEDICS | Facility: CLINIC | Age: 59
End: 2019-05-31
Payer: COMMERCIAL

## 2019-05-31 VITALS
SYSTOLIC BLOOD PRESSURE: 118 MMHG | BODY MASS INDEX: 21.61 KG/M2 | WEIGHT: 126.56 LBS | DIASTOLIC BLOOD PRESSURE: 68 MMHG | HEART RATE: 74 BPM | HEIGHT: 64 IN

## 2019-05-31 DIAGNOSIS — G56.02 LEFT CARPAL TUNNEL SYNDROME: ICD-10-CM

## 2019-05-31 DIAGNOSIS — G56.22 ULNAR NERVE COMPRESSION, LEFT: Primary | ICD-10-CM

## 2019-05-31 PROCEDURE — 99999 PR PBB SHADOW E&M-EST. PATIENT-LVL III: ICD-10-PCS | Mod: PBBFAC,,, | Performed by: PLASTIC SURGERY

## 2019-05-31 PROCEDURE — 99214 OFFICE O/P EST MOD 30 MIN: CPT | Mod: S$GLB,,, | Performed by: PLASTIC SURGERY

## 2019-05-31 PROCEDURE — 3008F BODY MASS INDEX DOCD: CPT | Mod: CPTII,S$GLB,, | Performed by: PLASTIC SURGERY

## 2019-05-31 PROCEDURE — 99999 PR PBB SHADOW E&M-EST. PATIENT-LVL III: CPT | Mod: PBBFAC,,, | Performed by: PLASTIC SURGERY

## 2019-05-31 PROCEDURE — 99214 PR OFFICE/OUTPT VISIT, EST, LEVL IV, 30-39 MIN: ICD-10-PCS | Mod: S$GLB,,, | Performed by: PLASTIC SURGERY

## 2019-05-31 PROCEDURE — 3008F PR BODY MASS INDEX (BMI) DOCUMENTED: ICD-10-PCS | Mod: CPTII,S$GLB,, | Performed by: PLASTIC SURGERY

## 2019-05-31 RX ORDER — SODIUM CHLORIDE 9 MG/ML
INJECTION, SOLUTION INTRAVENOUS CONTINUOUS
Status: CANCELLED | OUTPATIENT
Start: 2019-05-31

## 2019-05-31 NOTE — H&P (VIEW-ONLY)
Chief Complaint: Results      HPI:    Sincere Riggins returns today discuss her recent EMG results.  She states that her left upper extremity has become more painful and bothersome after the EMG was performed. She is experiencing increased numbness and tingling and difficulty sleeping at night.  She denies any other changes in medical history no other complaints today      History reviewed. No pertinent past medical history.    History reviewed. No pertinent surgical history.     Family History   Problem Relation Age of Onset    Alzheimer's disease Mother     Diabetes Father     No Known Problems Maternal Grandmother     Leukemia Maternal Grandfather     Heart disease Paternal Grandmother     Emphysema Paternal Grandfather        Social History     Socioeconomic History    Marital status:      Spouse name: Not on file    Number of children: Not on file    Years of education: Not on file    Highest education level: Not on file   Occupational History    Not on file   Social Needs    Financial resource strain: Not on file    Food insecurity:     Worry: Not on file     Inability: Not on file    Transportation needs:     Medical: Not on file     Non-medical: Not on file   Tobacco Use    Smoking status: Never Smoker    Smokeless tobacco: Never Used   Substance and Sexual Activity    Alcohol use: Not on file    Drug use: Not on file    Sexual activity: Not on file   Lifestyle    Physical activity:     Days per week: Not on file     Minutes per session: Not on file    Stress: Not on file   Relationships    Social connections:     Talks on phone: Not on file     Gets together: Not on file     Attends Anabaptist service: Not on file     Active member of club or organization: Not on file     Attends meetings of clubs or organizations: Not on file     Relationship status: Not on file   Other Topics Concern    Not on file   Social History Narrative    Not on file       Review of patient's allergies  "indicates:  No Known Allergies      Current Outpatient Medications:     calcium carbonate (OS-BRITTANY) 500 mg calcium (1,250 mg) tablet, Take 1 tablet by mouth once daily., Disp: , Rfl:     estradiol (VIVELLE-DOT) 0.0375 mg/24 hr, , Disp: , Rfl:     fish oil-omega-3 fatty acids 300-1,000 mg capsule, Take by mouth once daily., Disp: , Rfl:     gabapentin (NEURONTIN) 300 MG capsule, 2 daily, Disp: 180 capsule, Rfl: 3    medroxyPROGESTERone (PROVERA) 5 MG tablet, , Disp: , Rfl:     methylPREDNISolone (MEDROL DOSEPACK) 4 mg tablet, use as directed x 6 days, Disp: 1 Package, Rfl: 0    multivitamin capsule, Take 1 capsule by mouth once daily., Disp: , Rfl:     pravastatin (PRAVACHOL) 10 MG tablet, , Disp: , Rfl:     temazepam (RESTORIL) 15 mg Cap, Take 15 mg by mouth nightly., Disp: , Rfl: 2    vitamin D 1000 units Tab, Take 1,000 Units by mouth once daily., Disp: , Rfl:         Review of Systems:  Constitutional: no fever or chills  ENT: no nasal congestion or sore throat  Respiratory: no cough or shortness of breath  Cardiovascular: no chest pain or palpitations  Gastrointestinal: no nausea or vomiting, PUD, GERD, NSAID intolerance  Genitourinary: no hematuria or dysuria  Integument/Breast: no rash or pruritis  Hematologic/Lymphatic: no easy bruising or lymphadenopathy  Musculoskeletal: see HPI  Neurological: no seizures or tremors  Behavioral/Psych: no auditory or visual hallucinations      Objective:      PHYSICAL EXAM:  Vitals:    05/31/19 1417   BP: 118/68   Pulse: 74   Weight: 57.4 kg (126 lb 8.7 oz)   Height: 5' 4" (1.626 m)   PainSc:   8     General Appearance: WDWN, NAD  Neuro/Psych: Mood & affect appropriate  Lungs: Respirations equal and unlabored.   CV: No apparent CV dysfunction, distal pulses intact, RRR  Skin: Intact throughout  Extremities:   Left Hand Exam     Tests   Phalens sign: positive  Tinel's sign (median nerve): positive    Other   Erythema: absent  Sensation: decreased (Decreased in the " ulnar distribution)  Pulse: present    Comments:  Mild claw hand deformity, FDI atrophy weak intrinsics      Left Elbow Exam     Range of Motion   The patient has normal left elbow ROM.    Tests   Tinel's sign (cubital tunnel): positive            EMG impression left ulnar nerve compression at the elbow and bilateral carpal tunnel syndrome      Assessment:     Encounter Diagnoses   Name Primary?    Ulnar nerve compression, left Yes    Left carpal tunnel syndrome         Plan/Discussion:   Sincere was seen today for results.    Diagnoses and all orders for this visit:    Ulnar nerve compression, left  -     Vital signs; Standing  -     Cleanse with Chlorhexidine (CHG); Standing  -     Diet NPO; Standing  -     Place FIONA hose; Standing  -     Place sequential compression device; Standing  -     Case Request Operating Room: RELEASE, CARPAL TUNNEL, TRANSPOSITION, NERVE ulnar  -     Full code; Standing  -     Place in Outpatient; Standing    Left carpal tunnel syndrome  -     Vital signs; Standing  -     Cleanse with Chlorhexidine (CHG); Standing  -     Diet NPO; Standing  -     Place FIONA hose; Standing  -     Place sequential compression device; Standing  -     Case Request Operating Room: RELEASE, CARPAL TUNNEL, TRANSPOSITION, NERVE ulnar  -     Full code; Standing  -     Place in Outpatient; Standing    Other orders  -     0.9%  NaCl infusion  -     IP VTE LOW RISK PATIENT; Standing  -     ceFAZolin (ANCEF) 2 g in dextrose 5 % 50 mL IVPB      The patient was found to have severe left ulnar nerve compression across the elbow consistent with cubital tunnel syndrome.  She was also found to have bilateral carpal tunnel syndrome.  Today on exam she demonstrates positive Tinel's over the carpal tunnel.  I discussed performing a left ulnar nerve release at the elbow with possible anterior transposition with a left carpal tunnel release.  The patient wished to proceed as soon as possible.  I discussed the risks benefits and  alternatives in detail and she wished to proceed informed consent was obtained the patient was then scheduled for the procedure on June 7, 2019

## 2019-05-31 NOTE — PROGRESS NOTES
Chief Complaint: Results      HPI:    Sincere Riggins returns today discuss her recent EMG results.  She states that her left upper extremity has become more painful and bothersome after the EMG was performed. She is experiencing increased numbness and tingling and difficulty sleeping at night.  She denies any other changes in medical history no other complaints today      History reviewed. No pertinent past medical history.    History reviewed. No pertinent surgical history.     Family History   Problem Relation Age of Onset    Alzheimer's disease Mother     Diabetes Father     No Known Problems Maternal Grandmother     Leukemia Maternal Grandfather     Heart disease Paternal Grandmother     Emphysema Paternal Grandfather        Social History     Socioeconomic History    Marital status:      Spouse name: Not on file    Number of children: Not on file    Years of education: Not on file    Highest education level: Not on file   Occupational History    Not on file   Social Needs    Financial resource strain: Not on file    Food insecurity:     Worry: Not on file     Inability: Not on file    Transportation needs:     Medical: Not on file     Non-medical: Not on file   Tobacco Use    Smoking status: Never Smoker    Smokeless tobacco: Never Used   Substance and Sexual Activity    Alcohol use: Not on file    Drug use: Not on file    Sexual activity: Not on file   Lifestyle    Physical activity:     Days per week: Not on file     Minutes per session: Not on file    Stress: Not on file   Relationships    Social connections:     Talks on phone: Not on file     Gets together: Not on file     Attends Mormonism service: Not on file     Active member of club or organization: Not on file     Attends meetings of clubs or organizations: Not on file     Relationship status: Not on file   Other Topics Concern    Not on file   Social History Narrative    Not on file       Review of patient's allergies  "indicates:  No Known Allergies      Current Outpatient Medications:     calcium carbonate (OS-BRITTANY) 500 mg calcium (1,250 mg) tablet, Take 1 tablet by mouth once daily., Disp: , Rfl:     estradiol (VIVELLE-DOT) 0.0375 mg/24 hr, , Disp: , Rfl:     fish oil-omega-3 fatty acids 300-1,000 mg capsule, Take by mouth once daily., Disp: , Rfl:     gabapentin (NEURONTIN) 300 MG capsule, 2 daily, Disp: 180 capsule, Rfl: 3    medroxyPROGESTERone (PROVERA) 5 MG tablet, , Disp: , Rfl:     methylPREDNISolone (MEDROL DOSEPACK) 4 mg tablet, use as directed x 6 days, Disp: 1 Package, Rfl: 0    multivitamin capsule, Take 1 capsule by mouth once daily., Disp: , Rfl:     pravastatin (PRAVACHOL) 10 MG tablet, , Disp: , Rfl:     temazepam (RESTORIL) 15 mg Cap, Take 15 mg by mouth nightly., Disp: , Rfl: 2    vitamin D 1000 units Tab, Take 1,000 Units by mouth once daily., Disp: , Rfl:         Review of Systems:  Constitutional: no fever or chills  ENT: no nasal congestion or sore throat  Respiratory: no cough or shortness of breath  Cardiovascular: no chest pain or palpitations  Gastrointestinal: no nausea or vomiting, PUD, GERD, NSAID intolerance  Genitourinary: no hematuria or dysuria  Integument/Breast: no rash or pruritis  Hematologic/Lymphatic: no easy bruising or lymphadenopathy  Musculoskeletal: see HPI  Neurological: no seizures or tremors  Behavioral/Psych: no auditory or visual hallucinations      Objective:      PHYSICAL EXAM:  Vitals:    05/31/19 1417   BP: 118/68   Pulse: 74   Weight: 57.4 kg (126 lb 8.7 oz)   Height: 5' 4" (1.626 m)   PainSc:   8     General Appearance: WDWN, NAD  Neuro/Psych: Mood & affect appropriate  Lungs: Respirations equal and unlabored.   CV: No apparent CV dysfunction, distal pulses intact, RRR  Skin: Intact throughout  Extremities:   Left Hand Exam     Tests   Phalens sign: positive  Tinel's sign (median nerve): positive    Other   Erythema: absent  Sensation: decreased (Decreased in the " ulnar distribution)  Pulse: present    Comments:  Mild claw hand deformity, FDI atrophy weak intrinsics      Left Elbow Exam     Range of Motion   The patient has normal left elbow ROM.    Tests   Tinel's sign (cubital tunnel): positive            EMG impression left ulnar nerve compression at the elbow and bilateral carpal tunnel syndrome      Assessment:     Encounter Diagnoses   Name Primary?    Ulnar nerve compression, left Yes    Left carpal tunnel syndrome         Plan/Discussion:   Sincere was seen today for results.    Diagnoses and all orders for this visit:    Ulnar nerve compression, left  -     Vital signs; Standing  -     Cleanse with Chlorhexidine (CHG); Standing  -     Diet NPO; Standing  -     Place FIONA hose; Standing  -     Place sequential compression device; Standing  -     Case Request Operating Room: RELEASE, CARPAL TUNNEL, TRANSPOSITION, NERVE ulnar  -     Full code; Standing  -     Place in Outpatient; Standing    Left carpal tunnel syndrome  -     Vital signs; Standing  -     Cleanse with Chlorhexidine (CHG); Standing  -     Diet NPO; Standing  -     Place FIONA hose; Standing  -     Place sequential compression device; Standing  -     Case Request Operating Room: RELEASE, CARPAL TUNNEL, TRANSPOSITION, NERVE ulnar  -     Full code; Standing  -     Place in Outpatient; Standing    Other orders  -     0.9%  NaCl infusion  -     IP VTE LOW RISK PATIENT; Standing  -     ceFAZolin (ANCEF) 2 g in dextrose 5 % 50 mL IVPB      The patient was found to have severe left ulnar nerve compression across the elbow consistent with cubital tunnel syndrome.  She was also found to have bilateral carpal tunnel syndrome.  Today on exam she demonstrates positive Tinel's over the carpal tunnel.  I discussed performing a left ulnar nerve release at the elbow with possible anterior transposition with a left carpal tunnel release.  The patient wished to proceed as soon as possible.  I discussed the risks benefits and  alternatives in detail and she wished to proceed informed consent was obtained the patient was then scheduled for the procedure on June 7, 2019

## 2019-06-06 ENCOUNTER — HOSPITAL ENCOUNTER (OUTPATIENT)
Dept: PREADMISSION TESTING | Facility: OTHER | Age: 59
Discharge: HOME | End: 2019-06-06
Attending: PLASTIC SURGERY
Payer: COMMERCIAL

## 2019-06-06 ENCOUNTER — TELEPHONE (OUTPATIENT)
Dept: ORTHOPEDICS | Facility: CLINIC | Age: 59
End: 2019-06-06

## 2019-06-06 ENCOUNTER — ANESTHESIA EVENT (OUTPATIENT)
Dept: SURGERY | Facility: OTHER | Age: 59
End: 2019-06-06
Payer: COMMERCIAL

## 2019-06-06 VITALS
WEIGHT: 125 LBS | OXYGEN SATURATION: 99 % | SYSTOLIC BLOOD PRESSURE: 119 MMHG | TEMPERATURE: 98 F | HEIGHT: 64 IN | BODY MASS INDEX: 21.34 KG/M2 | DIASTOLIC BLOOD PRESSURE: 54 MMHG | HEART RATE: 69 BPM

## 2019-06-06 RX ORDER — SODIUM CHLORIDE, SODIUM LACTATE, POTASSIUM CHLORIDE, CALCIUM CHLORIDE 600; 310; 30; 20 MG/100ML; MG/100ML; MG/100ML; MG/100ML
INJECTION, SOLUTION INTRAVENOUS CONTINUOUS
Status: CANCELLED | OUTPATIENT
Start: 2019-06-06

## 2019-06-06 NOTE — DISCHARGE INSTRUCTIONS
PRE-ADMIT TESTING -  [phone]    [address]  [facility]          Your surgery has been scheduled at [facility]. We are pleased to have the opportunity to serve you. For Further Information please call [phone].    On the day of surgery please report to the Information Desk on the 1st floor.    · CONTACT YOUR PHYSICIAN'S OFFICE THE DAY PRIOR TO YOUR SURGERY TO OBTAIN YOUR ARRIVAL TIME.     · The evening before surgery do not eat anything after 9 p.m. ( this includes hard candy, chewing gum and mints).  You may only have GATORADE, POWERADE AND WATER  from 9 p.m. until you leave your home.   DO NOT DRINK ANY LIQUIDS ON THE WAY TO THE HOSPITAL.      SPECIAL MEDICATION INSTRUCTIONS: TAKE medications checked off by the Anesthesiologist on your Medication List.    Angiogram Patients: Take medications as instructed by your physician, including aspirin.     Surgery Patients:    If you take ASPIRIN - Your PHYSICIAN/SURGEON will need to inform you IF/OR when you need to stop taking aspirin prior to your surgery.     Do Not take any medications containing IBUPROFEN.  Do Not Wear any make-up or dark nail polish   (especially eye make-up) to surgery. If you come to surgery with makeup on you will be required to remove the makeup or nail polish.    Do not shave your surgical area at least 5 days prior to your surgery. The surgical prep will be performed at the hospital according to Infection Control regulations.    Leave all valuables at home.   Do Not wear any jewelry or watches, including any metal in body piercings. Jewelry must be removed prior to coming to the hospital.  There is a possibility that rings that are unable to be removed may be cut off if they are on the surgical extremity.    Contact Lens must be removed before surgery. Either do not wear the contact lens or bring a case and solution for storage.  Please bring a container for eyeglasses or dentures as required.  Bring  any paperwork your physician has provided, such as consent forms,  history and physicals, doctor's orders, etc.   Bring comfortable clothes that are loose fitting to wear upon discharge. Take into consideration the type of surgery being performed.  Maintain your diet as advised per your physician the day prior to surgery.      Adequate rest the night before surgery is advised.   Park in the Parking lot behind the hospital or in the [facility] across the street from the parking lot. Parking is complimentary.  If you will be discharged the same day as your procedure, please arrange for a responsible adult to drive you home or to accompany you if traveling by taxi.   YOU WILL NOT BE PERMITTED TO DRIVE OR TO LEAVE THE HOSPITAL ALONE AFTER SURGERY.   It is strongly recommended that you arrange for someone to remain with you for the first 24 hrs following your surgery.    The Surgeon will speak to your family/visitor after your surgery regarding the outcome of your surgery and post op care.  The Surgeon may speak to you after your surgery, but there is a possibility you may not remember the details.  Please check with your family members regarding the conversation with the Surgeon.      Thank you for your cooperation.  The Staff of [facility].                Bathing Instructions with Hibiclens     Shower the evening before and morning of your procedure with Hibiclens:   Wash your face with water and your regular face wash/soap   Apply Hibiclens directly on your skin or on a wet washcloth and wash gently. When showering: Move away from the shower stream when applying Hibiclens to avoid rinsing off too soon.   Rinse thoroughly with warm water   Do not dilute Hibiclens         Dry off as usual, do not use any deodorant, powder, body lotions, perfume, after shave or cologne.

## 2019-06-06 NOTE — ANESTHESIA PREPROCEDURE EVALUATION
06/06/2019  Sincere Riggins is a 58 y.o., female.    Anesthesia Evaluation    I have reviewed the Patient Summary Reports.    I have reviewed the Nursing Notes.   I have reviewed the Medications.     Review of Systems  Anesthesia Hx:  No problems with previous Anesthesia  Denies Family Hx of Anesthesia complications.   Denies Personal Hx of Anesthesia complications.   Social:  Non-Smoker    Hematology/Oncology:  Hematology Normal   Oncology Normal     EENT/Dental:EENT/Dental Normal   Cardiovascular:  Cardiovascular Normal Exercise tolerance: good     Pulmonary:  Pulmonary Normal    Renal/:  Renal/ Normal     Musculoskeletal:  Musculoskeletal Normal    Neurological:   Peripheral Neuropathy    Endocrine:  Endocrine Normal    Dermatological:  Skin Normal    Psych:  Psychiatric Normal           Physical Exam  General:  Well nourished    Airway/Jaw/Neck:  Airway Findings: Mouth Opening: Normal Tongue: Normal  General Airway Assessment: Adult  Mallampati: I  TM Distance: Normal, at least 6 cm  Jaw/Neck Findings:  Neck ROM: Normal ROM      Dental:  Dental Findings: In tact             Anesthesia Plan  Type of Anesthesia, risks & benefits discussed:  Anesthesia Type:  regional  Patient's Preference:   Intra-op Monitoring Plan:   Intra-op Monitoring Plan Comments:   Post Op Pain Control Plan: peripheral nerve block  Post Op Pain Control Plan Comments:   Induction:    Beta Blocker:         Informed Consent: Patient understands risks and agrees with Anesthesia plan.  Questions answered. Anesthesia consent signed with patient.  ASA Score: 1     Day of Surgery Review of History & Physical:    H&P update referred to the surgeon.         Ready For Surgery From Anesthesia Perspective.

## 2019-06-07 ENCOUNTER — ANESTHESIA (OUTPATIENT)
Dept: SURGERY | Facility: OTHER | Age: 59
End: 2019-06-07
Payer: COMMERCIAL

## 2019-06-07 ENCOUNTER — HOSPITAL ENCOUNTER (OUTPATIENT)
Facility: OTHER | Age: 59
Discharge: HOME | End: 2019-06-07
Attending: PLASTIC SURGERY | Admitting: PLASTIC SURGERY
Payer: COMMERCIAL

## 2019-06-07 VITALS
OXYGEN SATURATION: 98 % | TEMPERATURE: 99 F | HEART RATE: 83 BPM | SYSTOLIC BLOOD PRESSURE: 107 MMHG | HEIGHT: 64 IN | WEIGHT: 125 LBS | BODY MASS INDEX: 21.34 KG/M2 | RESPIRATION RATE: 16 BRPM | DIASTOLIC BLOOD PRESSURE: 52 MMHG

## 2019-06-07 DIAGNOSIS — G56.02 LEFT CARPAL TUNNEL SYNDROME: ICD-10-CM

## 2019-06-07 DIAGNOSIS — G56.22 ULNAR NERVE COMPRESSION, LEFT: ICD-10-CM

## 2019-06-07 PROCEDURE — 63600175 PHARM REV CODE 636 W HCPCS: Performed by: PLASTIC SURGERY

## 2019-06-07 PROCEDURE — 37000009 HC ANESTHESIA EA ADD 15 MINS: Performed by: PLASTIC SURGERY

## 2019-06-07 PROCEDURE — 25000003 PHARM REV CODE 250: Performed by: ANESTHESIOLOGY

## 2019-06-07 PROCEDURE — 64721 CARPAL TUNNEL SURGERY: CPT | Mod: 51,LT,, | Performed by: PLASTIC SURGERY

## 2019-06-07 PROCEDURE — 36000707: Performed by: PLASTIC SURGERY

## 2019-06-07 PROCEDURE — 64718 REVISE ULNAR NERVE AT ELBOW: CPT | Mod: LT,,, | Performed by: PLASTIC SURGERY

## 2019-06-07 PROCEDURE — 37000008 HC ANESTHESIA 1ST 15 MINUTES: Performed by: PLASTIC SURGERY

## 2019-06-07 PROCEDURE — 71000015 HC POSTOP RECOV 1ST HR: Performed by: PLASTIC SURGERY

## 2019-06-07 PROCEDURE — 63600175 PHARM REV CODE 636 W HCPCS: Performed by: NURSE ANESTHETIST, CERTIFIED REGISTERED

## 2019-06-07 PROCEDURE — 64718 PR REVISE ULNAR NERVE AT ELBOW: ICD-10-PCS | Mod: LT,,, | Performed by: PLASTIC SURGERY

## 2019-06-07 PROCEDURE — 64721 PR REVISE MEDIAN N/CARPAL TUNNEL SURG: ICD-10-PCS | Mod: 51,LT,, | Performed by: PLASTIC SURGERY

## 2019-06-07 PROCEDURE — 63600175 PHARM REV CODE 636 W HCPCS: Performed by: ANESTHESIOLOGY

## 2019-06-07 PROCEDURE — 71000016 HC POSTOP RECOV ADDL HR: Performed by: PLASTIC SURGERY

## 2019-06-07 PROCEDURE — 36000706: Performed by: PLASTIC SURGERY

## 2019-06-07 RX ORDER — LIDOCAINE HCL/EPINEPHRINE/PF 2%-1:200K
VIAL (ML) INJECTION
Status: COMPLETED | OUTPATIENT
Start: 2019-06-07 | End: 2019-06-07

## 2019-06-07 RX ORDER — FENTANYL CITRATE 50 UG/ML
100 INJECTION, SOLUTION INTRAMUSCULAR; INTRAVENOUS EVERY 5 MIN PRN
Status: COMPLETED | OUTPATIENT
Start: 2019-06-07 | End: 2019-06-07

## 2019-06-07 RX ORDER — PROPOFOL 10 MG/ML
VIAL (ML) INTRAVENOUS
Status: DISCONTINUED | OUTPATIENT
Start: 2019-06-07 | End: 2019-06-07

## 2019-06-07 RX ORDER — ROPIVACAINE HYDROCHLORIDE 5 MG/ML
INJECTION, SOLUTION EPIDURAL; INFILTRATION; PERINEURAL
Status: COMPLETED | OUTPATIENT
Start: 2019-06-07 | End: 2019-06-07

## 2019-06-07 RX ORDER — HYDROCODONE BITARTRATE AND ACETAMINOPHEN 5; 325 MG/1; MG/1
1 TABLET ORAL EVERY 4 HOURS PRN
Qty: 30 TABLET | Refills: 0 | Status: SHIPPED | OUTPATIENT
Start: 2019-06-07 | End: 2020-02-10

## 2019-06-07 RX ORDER — SODIUM CHLORIDE, SODIUM LACTATE, POTASSIUM CHLORIDE, CALCIUM CHLORIDE 600; 310; 30; 20 MG/100ML; MG/100ML; MG/100ML; MG/100ML
INJECTION, SOLUTION INTRAVENOUS CONTINUOUS
Status: DISCONTINUED | OUTPATIENT
Start: 2019-06-07 | End: 2019-06-07 | Stop reason: HOSPADM

## 2019-06-07 RX ORDER — SODIUM CHLORIDE 0.9 % (FLUSH) 0.9 %
3 SYRINGE (ML) INJECTION
Status: DISCONTINUED | OUTPATIENT
Start: 2019-06-07 | End: 2019-06-07 | Stop reason: HOSPADM

## 2019-06-07 RX ORDER — CEFAZOLIN SODIUM 1 G/3ML
2 INJECTION, POWDER, FOR SOLUTION INTRAMUSCULAR; INTRAVENOUS
Status: COMPLETED | OUTPATIENT
Start: 2019-06-07 | End: 2019-06-07

## 2019-06-07 RX ORDER — HYDROCODONE BITARTRATE AND ACETAMINOPHEN 5; 325 MG/1; MG/1
1 TABLET ORAL EVERY 4 HOURS PRN
Status: DISCONTINUED | OUTPATIENT
Start: 2019-06-07 | End: 2019-06-07 | Stop reason: HOSPADM

## 2019-06-07 RX ORDER — LIDOCAINE HCL/PF 100 MG/5ML
SYRINGE (ML) INTRAVENOUS
Status: DISCONTINUED | OUTPATIENT
Start: 2019-06-07 | End: 2019-06-07

## 2019-06-07 RX ORDER — SODIUM CHLORIDE 9 MG/ML
INJECTION, SOLUTION INTRAVENOUS CONTINUOUS
Status: DISCONTINUED | OUTPATIENT
Start: 2019-06-07 | End: 2019-06-07 | Stop reason: HOSPADM

## 2019-06-07 RX ORDER — OXYCODONE HYDROCHLORIDE 5 MG/1
5 TABLET ORAL
Status: DISCONTINUED | OUTPATIENT
Start: 2019-06-07 | End: 2019-06-07 | Stop reason: HOSPADM

## 2019-06-07 RX ORDER — MIDAZOLAM HYDROCHLORIDE 1 MG/ML
2 INJECTION INTRAMUSCULAR; INTRAVENOUS
Status: COMPLETED | OUTPATIENT
Start: 2019-06-07 | End: 2019-06-07

## 2019-06-07 RX ORDER — SODIUM CHLORIDE 0.9 % (FLUSH) 0.9 %
10 SYRINGE (ML) INJECTION
Status: DISCONTINUED | OUTPATIENT
Start: 2019-06-07 | End: 2019-06-07 | Stop reason: HOSPADM

## 2019-06-07 RX ORDER — PROPOFOL 10 MG/ML
VIAL (ML) INTRAVENOUS CONTINUOUS PRN
Status: DISCONTINUED | OUTPATIENT
Start: 2019-06-07 | End: 2019-06-07

## 2019-06-07 RX ORDER — HYDROMORPHONE HYDROCHLORIDE 2 MG/ML
0.2 INJECTION, SOLUTION INTRAMUSCULAR; INTRAVENOUS; SUBCUTANEOUS EVERY 5 MIN PRN
Status: DISCONTINUED | OUTPATIENT
Start: 2019-06-07 | End: 2019-06-07 | Stop reason: HOSPADM

## 2019-06-07 RX ORDER — HYDROCODONE BITARTRATE AND ACETAMINOPHEN 5; 325 MG/1; MG/1
1 TABLET ORAL EVERY 4 HOURS PRN
Qty: 30 TABLET | Refills: 0 | Status: SHIPPED | OUTPATIENT
Start: 2019-06-07 | End: 2019-06-07 | Stop reason: SDUPTHER

## 2019-06-07 RX ADMIN — ROPIVACAINE HYDROCHLORIDE 20 ML: 5 INJECTION, SOLUTION EPIDURAL; INFILTRATION; PERINEURAL at 08:06

## 2019-06-07 RX ADMIN — FENTANYL CITRATE 100 MCG: 50 INJECTION, SOLUTION INTRAMUSCULAR; INTRAVENOUS at 08:06

## 2019-06-07 RX ADMIN — SODIUM CHLORIDE, SODIUM LACTATE, POTASSIUM CHLORIDE, AND CALCIUM CHLORIDE: 600; 310; 30; 20 INJECTION, SOLUTION INTRAVENOUS at 07:06

## 2019-06-07 RX ADMIN — PROPOFOL 30 MG: 10 INJECTION, EMULSION INTRAVENOUS at 08:06

## 2019-06-07 RX ADMIN — MIDAZOLAM HYDROCHLORIDE 2 MG: 1 INJECTION, SOLUTION INTRAMUSCULAR; INTRAVENOUS at 08:06

## 2019-06-07 RX ADMIN — LIDOCAINE HYDROCHLORIDE,EPINEPHRINE BITARTRATE 20 ML: 20; .005 INJECTION, SOLUTION EPIDURAL; INFILTRATION; INTRACAUDAL; PERINEURAL at 08:06

## 2019-06-07 RX ADMIN — CEFAZOLIN 2 G: 330 INJECTION, POWDER, FOR SOLUTION INTRAMUSCULAR; INTRAVENOUS at 08:06

## 2019-06-07 RX ADMIN — PROPOFOL 75 MCG/KG/MIN: 10 INJECTION, EMULSION INTRAVENOUS at 08:06

## 2019-06-07 RX ADMIN — LIDOCAINE HYDROCHLORIDE 50 MG: 20 INJECTION, SOLUTION INTRAVENOUS at 08:06

## 2019-06-07 NOTE — OP NOTE
DATE OF PROCEDURE:   06/07/2019     PREOPERATIVE DIAGNOSIS:   left ulnar nerve compression at the elbow and left carpal tunnel syndrome.     POSTOPERATIVE DIAGNOSIS:   same     PROCEDURE:   1.  Left ulnar nerve release at the elbow with anterior subcutaneous transposition  2.  Left open carpal tunnel release    SURGEON:  Vadim Harp Jr., M.D.     ANESTHESIA:  Regional with MAC     FLUIDS:  300 mL crystalloid.     ESTIMATED BLOOD LOSS:  Minimal.     SPECIMENS:  None.     FINDINGS:  Tight transverse retinacular ligament of the left carpal tunnel, left ulnar nerve compression through the cubital tunnel with subluxation of the ulnar nerve with flexion extension of the elbow     IMPLANTS AND GRAFTS:  None.     COMPLICATIONS:  None.     DISPOSITION:  To PACU, then home.     INDICATIONS:   Sincere Riggins is a 58 y.o. female who presented profound weakness of the left hand and constant numbness within the ulnar distribution.   An EMG was found to have left ulnar nerve compression at the elbow and left carpal tunnel syndrome carpal tunnel syndrome.  Based on the severe findings of left ulnar nerve compression we discussed performing a release of the ulnar nerve with possible transposition as well as a left open carpal tunnel release.   After discussing the risks, benefits and alternatives in detail, he wished to proceed and informed   consent was obtained and the patient was then scheduled for the procedure.     PROCEDURE IN DETAIL:        After proper identification of Sincere Riggins   in the   preoperative area, the patient was wheeled to the Operating Room on a hospital   stretcher.  Pressure points were padded and checked this time.  A timeout was   called when surgeons, nurses and Anesthesia agreed upon the patient and   procedure.   she was given Ancef for prophylactic antibiotics.     Next, the left upper extremity was then prepped and draped in usual sterile fashion. A sterile padded 18 in tourniquet was then  placed to the left arm.   An Esmarch   tourniquet was used to exsanguinate the hand and tourniquet was inflated to 250   mmHg. A curvilinear incision was made between the medial epicondyle and the olecranon overlying the cubital tunnel.  The incision was carried down through subcutaneous tissue. The ulnar nerve was then identified proximally. The fascia overlying the nerve was carefully dissected and incised longitudinally over the nerve from a distal to proximal direction.  The nerve was noted to be compressed at the cubital tunnel.  After the nerve was completely released into the FCU muscle fascia the elbow was then placed in full flexion and extension. There was subluxation of the ulnar nerve over the medial epicondyle.  At this time it was decided that an anterior subcutaneous transposition would best improve the patient's symptoms. Therefore the nerve was carefully dissected 360°.  The nerve was handled with a vessel loop throughout the dissection.  Any vasculature running with the nerve was preserved.  A small portion of the intermuscular septum was taken down using electrocautery to allow excursion of the ulnar nerve. Next a fascial flap was created based off the medial epicondyle using the fascia overlying the muscle belly of the flexor group.  The nerve was then transposed anterior to the medial epicondyle and the fascial flap was then sutured to the subcutaneous fascia to prevent relocation of the ulnar nerve. Next the cubital tunnel was then closed using a single 4 0 Vicryl suture. The nerve appeared to be without tension and without any areas of compression.  I was then satisfied with the release and anterior transposition ulnar nerve and turned my attention to the carpal tunnel release.  A longitudinal incision was made through skin, dermis and subcutaneous   tissue using a #15 blade scalpel. The incision was then carried through the   palmaris fascia to the flexor retinacular ligament.  The ligament  was then   divided under direct visualization and antebrachial fasciotomy was performed   using Littler scissors in a distal to proximal direction under direct   visualization.  I then inspected the median nerve, which was in continuity and   uninjured.  I then digitally palpated through the carpal tunnel and felt that   the carpal tunnel was adequately released. The tourniquet was then released at   this time and hemostasis was achieved using electrocautery and the wounds were  irrigated with copious amounts of normal saline. The medial elbow incision was then closed in layers using a 3 0 Vicryl suture in a deep dermal interrupted fashion followed by closure of skin using a for Monocryl in a subcuticular fashion. The palmar incision was then closed using 4-0   nylon sutures in interrupted fashion. The wounds were then cleaned and dried.   the wounds were dressed with Xeroform and a dry sterile dressing   followed by a long-arm splint with the elbow flexed to approximately 45° and wrist in neutral and fingers free.  This concluded the procedure.  The patient tolerated the procedure well without any   complications.  At the end of the case, needle and sponge counts were correct   x2, and I was present and scrubbed all aspects of procedure.  The patient was then taken   back for further recovery.

## 2019-06-07 NOTE — ANESTHESIA POSTPROCEDURE EVALUATION
Anesthesia Post Evaluation    Patient: Sincere Riggins    Procedure(s) Performed: Procedure(s) (LRB):  RELEASE, CARPAL TUNNEL (Left)  TRANSPOSITION, NERVE ulnar (Left)    Final Anesthesia Type: regional  Patient location during evaluation: [facility]  Patient participation: Yes- Able to Participate  Level of consciousness: awake and alert  Post-procedure vital signs: reviewed and stable  Pain management: adequate  Airway patency: patent  PONV status at discharge: No PONV  Anesthetic complications: no      Cardiovascular status: blood pressure returned to baseline  Respiratory status: unassisted, spontaneous ventilation and room air  Hydration status: euvolemic  Follow-up not needed.          Vitals Value Taken Time   /58 6/7/2019  7:28 AM   Temp 36.9 °C (98.5 °F) 6/7/2019  7:28 AM   Pulse 65 6/7/2019  7:28 AM   Resp 16 6/7/2019  7:28 AM   SpO2 97 % 6/7/2019  7:28 AM         No case tracking events are documented in the log.      Pain/Le Score: No data recorded

## 2019-06-07 NOTE — INTERVAL H&P NOTE
The patient has been examined and the H&P has been reviewed:    I concur with the findings and no changes have occurred since H&P was written.    Anesthesia/Surgery risks, benefits and alternative options discussed and understood by patient/family.          Active Hospital Problems    Diagnosis  POA    Ulnar nerve compression, left [G56.22]  Yes      Resolved Hospital Problems   No resolved problems to display.

## 2019-06-07 NOTE — PLAN OF CARE
Sincere Serene Riggins has met all discharge criteria from Phase II. Vital Signs are stable, ambulating  without difficulty. Discharge instructions given, patient verbalized understanding. Discharged from facility via wheelchair in stable condition.

## 2019-06-07 NOTE — ANESTHESIA PROCEDURE NOTES
Ax block    Patient location during procedure: holding area    Reason for block: primary anesthetic   Diagnosis: ulnar nerve compression, CTS L   Start time: 6/7/2019 8:05 AM  Timeout: 6/7/2019 8:04 AM   End time: 6/7/2019 8:15 AM  Staffing  Anesthesiologist: Michelle Hopper MD  Performed: anesthesiologist   Preanesthetic Checklist  Completed: patient identified, site marked, surgical consent, pre-op evaluation, timeout performed, IV checked, risks and benefits discussed and monitors and equipment checked  Peripheral Block  Patient position: supine  Prep: ChloraPrep  Patient monitoring: heart rate, cardiac monitor, continuous pulse ox and frequent blood pressure checks  Block type: axillary  Laterality: left  Injection technique: single shot  Needle  Needle type: short-bevel   Needle gauge: 22 G  Needle length: 3.5 in  Needle localization: nerve stimulator and ultrasound guidance   -ultrasound image captured on disc.  Assessment  Injection assessment: negative aspiration, negative parasthesia and local visualized surrounding nerve  Paresthesia pain: none  Heart rate change: no  Slow fractionated injection: yes

## 2019-06-07 NOTE — BRIEF OP NOTE
[facility]  Brief Operative Note     SUMMARY     Surgery Date: 6/7/2019     Surgeon(s) and Role:     * Vadim Harp Jr., MD - Primary    Assisting Surgeon: None    Pre-op Diagnosis:  Ulnar nerve compression, left [G56.22]  Left carpal tunnel syndrome [G56.02]    Post-op Diagnosis:  Post-Op Diagnosis Codes:     * Ulnar nerve compression, left [G56.22]     * Left carpal tunnel syndrome [G56.02]    Procedure(s) (LRB):  RELEASE, CARPAL TUNNEL (Left)  TRANSPOSITION, NERVE ulnar (Left) Anterior subcutaneous transposition    Anesthesia: Regional    Description of the findings of the procedure:  Compression of the ulnar nerve at the cubital tunnel of the left arm requiring anterior subcutaneous transposition, left carpal tunnel syndrome    Findings/Key Components:  Subluxation of the ulnar nerve at the cubital tunnel    Estimated Blood Loss: * No values recorded between 6/7/2019  8:39 AM and 6/7/2019  9:43 AM *         Specimens:   Specimen (12h ago, onward)    None          Discharge Note    SUMMARY     Admit Date: 6/7/2019    Discharge Date and Time:  06/07/2019 9:44 AM    Hospital Course (synopsis of major diagnoses, care, treatment, and services provided during the course of the hospital stay):  Patient admitted for outpatient surgery     Final Diagnosis: Post-Op Diagnosis Codes:     * Ulnar nerve compression, left [G56.22]     * Left carpal tunnel syndrome [G56.02]    Disposition: Home or Self Care    Follow Up/Patient Instructions:  Follow up in Hand Clinic in 1 week    Medications:  Reconciled Home Medications:      Medication List      START taking these medications    HYDROcodone-acetaminophen 5-325 mg per tablet  Commonly known as:  NORCO  Take 1 tablet by mouth every 4 (four) hours as needed for Pain.        CONTINUE taking these medications    calcium carbonate 500 mg calcium (1,250 mg) tablet  Commonly known as:  OS-BRITTANY  Take 1 tablet by mouth once daily.     estradiol 0.0375 mg/24  hr  Commonly known as:  VIVELLE-DOT  1 patch every 7 days.     fish oil-omega-3 fatty acids 300-1,000 mg capsule  Take by mouth once daily.     gabapentin 300 MG capsule  Commonly known as:  NEURONTIN  2 daily     medroxyPROGESTERone 5 MG tablet  Commonly known as:  PROVERA  once daily.     multivitamin capsule  Take 1 capsule by mouth once daily.     pravastatin 10 MG tablet  Commonly known as:  PRAVACHOL     temazepam 15 mg Cap  Commonly known as:  RESTORIL  Take 15 mg by mouth nightly as needed.     vitamin D 1000 units Tab  Commonly known as:  VITAMIN D3  Take 1,000 Units by mouth once daily.          Discharge Procedure Orders   Diet general     Call MD for:  temperature >100.4     Call MD for:  persistent nausea and vomiting     Call MD for:  severe uncontrolled pain     Call MD for:  difficulty breathing, headache or visual disturbances     Call MD for:  redness, tenderness, or signs of infection (pain, swelling, redness, odor or green/yellow discharge around incision site)     Call MD for:  hives     Call MD for:  persistent dizziness or light-headedness     Call MD for:  extreme fatigue     Lifting restrictions     No driving, operating heavy equipment or signing legal documents while taking pain medication.     Change dressing (specify)   Order Comments: Remove splint and dressings in 1 week     Follow-up Information     Vadim Harp Jr, MD In 2 weeks.    Specialties:  Plastic Surgery, Hand Surgery  Why:  For wound re-check, For suture removal  Contact information:  [facility] [address] [id number] [phone]

## 2019-06-18 ENCOUNTER — TELEPHONE (OUTPATIENT)
Dept: ORTHOPEDICS | Facility: CLINIC | Age: 59
End: 2019-06-18

## 2019-06-18 NOTE — TELEPHONE ENCOUNTER
----- Message from Leigha Amaya sent at 6/18/2019  9:11 AM CDT -----  Contact: pt  Name of Who is Calling: ESTHELA STRATTON [[id number]]      What is the request in detail: pt is still experiencing pain and numbness in fingers.. Pt states her hand is still very swollen...please advise       Can the clinic reply by [facility]: no      What Number to Call Back if not in [facility]: [phone]

## 2019-06-21 ENCOUNTER — OFFICE VISIT (OUTPATIENT)
Dept: ORTHOPEDICS | Facility: CLINIC | Age: 59
End: 2019-06-21
Payer: COMMERCIAL

## 2019-06-21 VITALS
HEART RATE: 83 BPM | WEIGHT: 125 LBS | BODY MASS INDEX: 21.34 KG/M2 | SYSTOLIC BLOOD PRESSURE: 124 MMHG | HEIGHT: 64 IN | DIASTOLIC BLOOD PRESSURE: 81 MMHG

## 2019-06-21 DIAGNOSIS — Z98.890 STATUS POST SURGERY: Primary | ICD-10-CM

## 2019-06-21 DIAGNOSIS — M25.642 DECREASED RANGE OF MOTION OF FINGER OF LEFT HAND: ICD-10-CM

## 2019-06-21 PROCEDURE — 99999 PR PBB SHADOW E&M-EST. PATIENT-LVL III: CPT | Mod: PBBFAC,,, | Performed by: PLASTIC SURGERY

## 2019-06-21 PROCEDURE — 99999 PR PBB SHADOW E&M-EST. PATIENT-LVL III: ICD-10-PCS | Mod: PBBFAC,,, | Performed by: PLASTIC SURGERY

## 2019-06-21 PROCEDURE — 99024 PR POST-OP FOLLOW-UP VISIT: ICD-10-PCS | Mod: S$GLB,,, | Performed by: PLASTIC SURGERY

## 2019-06-21 PROCEDURE — 99024 POSTOP FOLLOW-UP VISIT: CPT | Mod: S$GLB,,, | Performed by: PLASTIC SURGERY

## 2019-06-21 NOTE — PROGRESS NOTES
"Ms. Riggins is here today for a post-operative visit.she is 14 days status post left cubital tunnel release with anterior transposition and left open carpal tunnel release.  . she reports that she is doing well with minimal pain and she feels that the numbness within the ring and small digit has worsened since surgery however her discomfort with tingling sensations has improved..  Pain is minimal.  she is not taking pain medication    she denies fever, chills, and sweats since the time of the surgery.     Physical exam:    Vitals:    06/21/19 1336   BP: 124/81   Pulse: 83   Weight: 56.7 kg (125 lb)   Height: 5' 4" (1.626 m)   PainSc:   4     Post op dressing taken down.  Incisions are clean, dry and intact.  There is no erythema or exudate.  There is no sign of any infection. she is NVI.  Dense numbness of the left small finger and half of the ring finger normal sensation in the median and radial distributions, FDP to small and ring finger intact, weak intrinsics with mild clawing    Assessment:  Status post surgery    Plan:  Sincere was seen today for post-op evaluation.    Diagnoses and all orders for this visit:    Status post surgery        - sutures remain today in clinic, may get incision wet in the shower and use regular soap  - discussed that her sensation will likely improve over the next several weeks/months and that we will continue to monitor progress.  The FDP to the left small and ring digit is intact which is a sign of ulnar nerve function therefore the nerve is in continuity and functioning.  I would like for her to begin occupational therapy for ulnar nerve glides and range of motion exercises  - Tylenol 500 mg and/or Ibuprofen 400mg every 4-6 hours with food for pain as tolerated  - - Follow up:  4 weeks    "

## 2019-06-27 ENCOUNTER — CLINICAL SUPPORT (OUTPATIENT)
Dept: REHABILITATION | Facility: HOSPITAL | Age: 59
End: 2019-06-27
Payer: COMMERCIAL

## 2019-06-27 DIAGNOSIS — M62.81 MUSCLE WEAKNESS: ICD-10-CM

## 2019-06-27 DIAGNOSIS — M25.632 STIFFNESS OF LEFT WRIST JOINT: ICD-10-CM

## 2019-06-27 DIAGNOSIS — M79.602 LEFT ARM PAIN: ICD-10-CM

## 2019-06-27 DIAGNOSIS — R20.9 SENSORY DISTURBANCE: ICD-10-CM

## 2019-06-27 PROCEDURE — 97110 THERAPEUTIC EXERCISES: CPT | Mod: PO

## 2019-06-27 PROCEDURE — 97165 OT EVAL LOW COMPLEX 30 MIN: CPT | Mod: PO

## 2019-06-27 NOTE — PLAN OF CARE
[facility] Occupational Therapy  Initial Evaluation     Date: 6/27/2019  Name: Sincere Riggins  Clinic Number: [id number]    Therapy Diagnosis:   Encounter Diagnoses   Name Primary?    Left arm pain     Stiffness of left wrist joint     Muscle weakness     Sensory disturbance      Physician: Vadim Harp Jr*    Physician Orders: Eval & Treat, post op care  Medical Diagnosis: L CTR & CuTR  Surgical Procedure and Date:  L CTR & CuTR, 06/07/19  Evaluation Date: 06/27/19  Insurance Authorization Period Expiration: 12/31/19  Plan of Care Certification Period: 06/27/19 to 08/07/19  Date of Return to MD: 07/25/19    Visit # / Visits authorized: 1 / 30  Time In:1:10 pm  Time Out: 2:10 pm  Total Billable Time: 60 minutes    Precautions:  Standard    Subjective     Involved Side: L elbow/wrist/hand  Dominant Side: Right  Date of Onset: 06/07/19  Mechanism of Injury: gradual onset of symptoms that progressively worsened  History of Current Condition: Patient went to a neurologist for neuropathies of B hands & feet. She was referred to spine specialist for examination and diagnostic work up.  She was referred to a Hand Ortho MD, who ordered a NCV/EMG study. 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 Surgical intervention was required to improve condition.   Surgical Procedure: L CTR, L CuTR  Imaging: none NCV/EMG only  Previous Therapy: None for this condition    Past Medical History/Physical Systems Review:   Sincere Riggins  has a past medical history of Carpal tunnel syndrome and Hypercholesteremia.    Sincere Riggins  has a past surgical history that includes [place] tooth extraction; Colonoscopy; Carpal tunnel release (Left, 6/7/2019); and Nerve transfer (Left, 6/7/2019).    Sincere has a current medication list which includes the following prescription(s): calcium carbonate, estradiol, fish oil-omega-3 fatty acids, gabapentin, hydrocodone-acetaminophen, medroxyprogesterone, multivitamin, pravastatin,  "temazepam, and vitamin d.    Review of patient's allergies indicates:  No Known Allergies     Patient's Goals for Therapy: "be able to hold and care for my grandchild"    Pain:  Functional Pain Scale Rating 0-10:   5/10 on average  2/10 at best  8/10 at worst  Location: L wrist/hand & L elbow, L RF & SF  Description: Aching, Dull, Throbbing, Tight, Tingling, Numb, Sharp, Shooting and Hot  Aggravating Factors: Night Time, Morning, Lifting and gripping & manipulating objects  Easing Factors: pain medication, ice, lying down and rest    Occupation: Principle of Elementary School  Working presently: employed  Duties: administrative    Functional Limitations/Social History:    Previous functional status includes: Independent with all ADLs. Independent    Current FunctionalStatus   Home/Living environment : lives with their family and lives with a grandchild      Limitation of Functional Status as follows:   ADLs/IADLs:     - Feeding: mild difficulty; but unable to cut meats    - Bathing: currently has assist    - Dressing/Grooming: minimal difficulty, assist needed for fasteners    - Driving: moderate difficulty      Objective     Observation/Appearance:  Joint stiffness, Hypertrophic scarring and Edema present. Scab remains at L wrist surgical site.     Edema. Measured in centimeters.   6/27/2019 6/27/2019    Left Right   2in. Above elbow 25.1 23.5   2in. Below elbow 21.8 23.0   Elbow Crease 24.5 23.2   Wrist Crease 15.6 15.0   Mid palm 18.7 19.8   MCPs 18.5 18.8     ROM:   Left  Right   Elbow E/F -20/149 WNL   Forearm Sup/pron WFL/WFL WNL   Wrist E/F 56/65 WNL   Wrist RD/UD 21/25 WNL   Thumb R/P Abd 42/40 WNL   Thumb MP flex 55 WNL   Thumb IP flex 53 WNL   Thumb [place] To base of SF WNL       Hand ROM. Measured in degrees.   6/27/2019 6/27/2019    Left Right        Index: MP  0/71 WNL              PIP     0/94 WNL              DIP 0/55 WNL              COSTA 220 WNL        Long:  MP 0/84 WNL              PIP 0/96 WNL        "       DIP 0/33 WNL              COSTA 213 WNL        Ring:   MP 0/80 WNL              PIP -25/87 WNL              DIP 0/31 WNL              COSTA 173 WNL        Small:  MP 0/71 WNL               PIP -35/75 WNL               DIP 0/34 WNL              COSTA 145 WNL     Sensation  Patient reports having N&T in L RF/SF to wrist     Strength (Dyanmometer) and Pinch Strength (Pinch Gauge)  Measured in pounds and psi. Average of three trials.   6/27/2019 6/27/2019    Left Right   Rung II Deferred Deferred   Garg Pinch Deferred Deferred   3pt Pinch Deferred Deferred   2pt Pinch Deferred Deferred       CMS Impairment/Limitation/Restriction for FOTO Wrist Survey    Therapist reviewed FOTO scores for Sincere Riggins on 6/27/2019.   FOTO documents entered into [employer] - see Media section.    Limitation Score: 65%  Category: Self Care    Current : CL = least 60% but < 80% impaired, limited or restricted  Goal: CJ = at least 20% but < 40% impaired, limited or restricted         Treatment     Treatment Time In: 1:50 pm  Treatment Time Out: 2:10 pm  Total Treatment time separate from Evaluation time: 20 minutes    Sincere received therapeutic exercises for 20 minutes including:  -Performed HEP, see attached for details    Home Exercise Program/Education:  Issued HEP (see patient instructions in EMR) and educated on modality use for pain management . Exercises were reviewed and Sincere was able to demonstrate them prior to the end of the session.   Pt received a written copy of exercises to perform at home. Sincere demonstrated good  understanding of the education provided.  Pt was advised to perform these exercises free of pain, and to stop performing them if pain occurs.    Patient/Family Education: role of OT, goals for OT, scheduling/cancellations - pt verbalized understanding. Discussed insurance limitations with patient.    Assessment     Sincere Riggins is a 58 y.o. female referred to outpatient occupational therapy and presents with a  medical diagnosis of L CTR & CuTR, resulting in Decreased ROM, Decreased  strength, Decreased pinch strength, Decreased functional hand use, Increased pain, Edema, Joint Stiffness, Scar Adhesions, Diminished/Impaired Sensation and Diminished/Impaired Coordination and demonstrates limitations as described in the chart below. Following medical record review it is determined that pt will benefit from occupational therapy services in order to maximize pain free and/or functional use of left UE. The following goals were discussed with the patient and patient is in agreement with them as to be addressed in the treatment plan. The patient's rehab potential is Good.     Anticipated barriers to occupational therapy: None  Pt has no cultural, educational or language barriers to learning provided.    Profile and History Assessment of Occupational Performance Level of Clinical Decision Making Complexity Score   Occupational Profile:   Sincere Riggins is a 58 y.o. female who lives with their family and lives with a grandchild and is currently employed as Principle. Sincere Riggins has difficulty with  feeding, bathing, grooming and dressing  driving/transportation management, shopping, phone/computer use, housework/household chores and medication management  affecting his/her daily functional abilities. His/her main goal for therapy is Be able to hold and care for my grandchild.     Comorbidities:   None    Medical and Therapy History Review:   Brief               Performance Deficits    Physical:  Joint Mobility  Skin Integrity/Scar Formation  Edema   Strength  Pinch Strength  Fine Motor Coordination  Pain    Cognitive:  No Deficits    Psychosocial:    No Deficits     Clinical Decision Making:  low    Assessment Process:  Detailed Assessments    Modification/Need for Assistance:  Minimal-Moderate Modifications/Assistance    Intervention Selection:  Several Treatment Options       low  Based on PMHX, co morbidities ,  data from assessments and functional level of assistance required with task and clinical presentation directly impacting function.       The following goals were discussed with the patient and patient is in agreement with them as to be addressed in the treatment plan.     Goals:   Short Term Goals: (in 2 weeks-07/10/19))  1) Patient will be independent in HEP  2) Decrease pain in L elbow/wrist/hand to no more than 4-5/10 worst in ADL/IADL's   3) Increase AROM in L wrist Extension by 10 degrees for improved functioning in ADL/IADL's  4) Assess  & pinch strength  5) Decrease edema in L wrist by .2-.3 cm   6) Increase L IF-SF COSTA's by 10-15 degrees for increased functional use of hand in ADL/IADL's    Long Term Goals: (in 6 weeks-08/07/19)  1) Decrease pain in L elbow/wrist/hand to no more than 1-2/10 worst in ADL/IADL's   2) Increase AROM of L elbow/wrist/hand to WFL for increased functioning in ADL/IADL's  3) Increase strength in L  & pinch by 20% of initial measures for improved functioning in ADL/IADL's  4) Increased functioning in ADL/IADL's, as evidenced by a FOTO impairment rating of no more than 35%  5) Decrease edema in L elbow/wrist/hand to trace or none        Plan     Certification Period/Plan of care expiration: 6/27/2019 to 08/07/19.    Outpatient Occupational Therapy 2 times weekly for 6 weeks to include the following interventions: Paraffin, Manual therapy/joint mobilizations, Modalities for pain management, US 3 mhz, Therapeutic exercises/activities., Strengthening, Orthotic Fabrication/Fit/Training, Edema Control, Scar Management, Wound Care and Energy Conservation.      Joseph Carlin, OT

## 2019-07-01 ENCOUNTER — CLINICAL SUPPORT (OUTPATIENT)
Dept: REHABILITATION | Facility: HOSPITAL | Age: 59
End: 2019-07-01
Payer: COMMERCIAL

## 2019-07-01 DIAGNOSIS — M25.632 STIFFNESS OF LEFT WRIST JOINT: ICD-10-CM

## 2019-07-01 DIAGNOSIS — R20.9 SENSORY DISTURBANCE: ICD-10-CM

## 2019-07-01 DIAGNOSIS — M62.81 MUSCLE WEAKNESS: ICD-10-CM

## 2019-07-01 DIAGNOSIS — M79.602 LEFT ARM PAIN: ICD-10-CM

## 2019-07-01 PROCEDURE — 97140 MANUAL THERAPY 1/> REGIONS: CPT | Mod: PO

## 2019-07-01 PROCEDURE — 97018 PARAFFIN BATH THERAPY: CPT | Mod: PO

## 2019-07-01 PROCEDURE — 97110 THERAPEUTIC EXERCISES: CPT | Mod: PO

## 2019-07-01 NOTE — PROGRESS NOTES
"  Occupational Therapy Daily Treatment Note     Date: 7/1/2019  Name: Sincere Riggins  Clinic Number: [id number]    Therapy Diagnosis:   Encounter Diagnoses   Name Primary?    Left arm pain     Stiffness of left wrist joint     Muscle weakness     Sensory disturbance      Physician: Vadim Harp Jr*    Physician Orders: Eval & Treat, post op care  Medical Diagnosis: L CTR & CuTR  Surgical Procedure and Date:  L CTR & CuTR, 06/07/19  Evaluation Date: 06/27/19  Insurance Authorization Period Expiration: 12/31/19  Plan of Care Certification Period: 06/27/19 to 08/07/19  Date of Return to MD: 07/25/19    Visit # / Visits authorized: 2 / 30  Time In:2:05 pm  Time Out: 3:00 pm  Total Billable Time: 55 minutes (P, 2MT, 1TE)    Precautions:  Standard      Subjective     Pt reports: "Most of the pain is in the palm of my hand from my RF & SF to the wrist"  she was compliant with home exercise program given last session.   Response to previous treatment: decreased pain and increased AROM   Functional change: none reported     Pain: 6/10 pre-tx; 4-5/10 post-tx  Location: left elbow/wrist/hand      Objective     Sincere received the following supervised modalities after being cleared for contradictions for 10 minutes:   -Paraffin w/ MHP to L wrist/hand, pre-tx to decrease pain & increase tissue extensibility; MHP applied to L elbow     Sincere received the following manual therapy techniques for 25 minutes:   -Edema mgmt w/ lymphatic drainage technique;  Deep STM, including MFR, CFM, TPR & scar mgmt to L elbow/forearm/wrist/hand, using hand techniques and IASTM tools to increase blood flow/circulation, improve soft tissue pliability and decrease pain.  -Kinesiotaping: CTS pattern and Ulnar nerve decompression taping applied to L wrist/hand. Patient instructed on purpose, wear, care, precautions to monitor and removal of KT. Patient verbalized understanding of all instructions provided.     Sincere received therapeutic exercises " for 20 minutes including:    AROM     forearm sup/pron 10 reps    wrist E/F, RD/UD 10 reps each    thumb Rad Abd 10 reps    thumb/pinky slides 10 reps        Dexterciser  3 min   Isospheres 2 min   9 Peg  1 trial, remove/replace            Home Exercises and Education Provided     Education provided:   - Reviewed initial HEP; added further scar mgmt techniques to HEP, including use of golf ball    Written Home Exercises Provided: Patient instructed to cont prior HEP.  Exercises were reviewed and Sincere was able to demonstrate them prior to the end of the session.  Sincere demonstrated good  understanding of the HEP provided.     See EMR under Patient Instructions for exercises provided prior visit.        Assessment     Patient tolerated treatment well today. She was noted to have a significant amount of scar tissue and edema in her L wrist/hand.  Pain remains high, but did decrease by end of treatment today.     Sincere is progressing well towards her goals and there are no updates to goals at this time. Pt prognosis is Good.     Pt will continue to benefit from skilled outpatient occupational therapy to address the deficits listed in the problem list on initial evaluation provide pt/family education and to maximize pt's level of independence in the home and community environment.     Anticipated barriers to occupational therapy: pain    Pt's spiritual, cultural and educational needs considered and pt agreeable to plan of care and goals.    Goals:  Short Term Goals: (in 2 weeks-07/10/19))  1) Patient will be independent in HEP  2) Decrease pain in L elbow/wrist/hand to no more than 4-5/10 worst in ADL/IADL's   3) Increase AROM in L wrist Extension by 10 degrees for improved functioning in ADL/IADL's  4) Assess  & pinch strength  5) Decrease edema in L wrist by .2-.3 cm   6) Increase L IF-SF COSTA's by 10-15 degrees for increased functional use of hand in ADL/IADL's     Long Term Goals: (in 6 weeks-08/07/19)  1) Decrease  pain in L elbow/wrist/hand to no more than 1-2/10 worst in ADL/IADL's   2) Increase AROM of L elbow/wrist/hand to WFL for increased functioning in ADL/IADL's  3) Increase strength in L  & pinch by 20% of initial measures for improved functioning in ADL/IADL's  4) Increased functioning in ADL/IADL's, as evidenced by a FOTO impairment rating of no more than 35%  5) Decrease edema in L elbow/wrist/hand to trace or none      Plan     Certification Period/Plan of care expiration: 6/27/2019 to 08/07/19.     Outpatient Occupational Therapy 2 times weekly for 6 weeks to include the following interventions: Paraffin, Manual therapy/joint mobilizations, Modalities for pain management, US 3 mhz, Therapeutic exercises/activities., Strengthening, Orthotic Fabrication/Fit/Training, Edema Control, Scar Management, Wound Care and Energy Conservation.    Updates/Grading for next session: begin light strenghtening      Joseph Carlin, OT

## 2019-07-03 ENCOUNTER — CLINICAL SUPPORT (OUTPATIENT)
Dept: REHABILITATION | Facility: HOSPITAL | Age: 59
End: 2019-07-03
Payer: COMMERCIAL

## 2019-07-03 DIAGNOSIS — M62.81 MUSCLE WEAKNESS: ICD-10-CM

## 2019-07-03 DIAGNOSIS — M25.632 STIFFNESS OF LEFT WRIST JOINT: ICD-10-CM

## 2019-07-03 DIAGNOSIS — M79.602 LEFT ARM PAIN: ICD-10-CM

## 2019-07-03 DIAGNOSIS — R20.9 SENSORY DISTURBANCE: ICD-10-CM

## 2019-07-03 PROCEDURE — 97140 MANUAL THERAPY 1/> REGIONS: CPT | Mod: PO

## 2019-07-03 PROCEDURE — 97110 THERAPEUTIC EXERCISES: CPT | Mod: PO

## 2019-07-03 PROCEDURE — 97035 APP MDLTY 1+ULTRASOUND EA 15: CPT | Mod: PO

## 2019-07-03 NOTE — PROGRESS NOTES
"  Occupational Therapy Daily Treatment Note     Date: 7/3/2019  Name: Sincere Riggins  Clinic Number: [id number]    Therapy Diagnosis:   Encounter Diagnoses   Name Primary?    Left arm pain     Stiffness of left wrist joint     Muscle weakness     Sensory disturbance      Physician: Vadim Harp Jr*    Physician Orders: Eval & Treat, post op care  Medical Diagnosis: L CTR & CuTR  Surgical Procedure and Date:  L CTR & CuTR, 06/07/19  Evaluation Date: 06/27/19  Insurance Authorization Period Expiration: 12/31/19  Plan of Care Certification Period: 06/27/19 to 08/07/19  Date of Return to MD: 07/25/19    Visit # / Visits authorized: 3 / 30  Time In: 2:00 pm  Time Out: 2:55 pm  Total Billable Time: 55 minutes (1US, 2MT, 1TE)    Precautions:  Standard      Subjective     Pt reports: "I've got a little bit more feeling in my RF & SF compared to the last week. It's getting better, but it still hurts a lot"  she was compliant with home exercise program given last session.   Response to previous treatment: decreased pain and increased AROM   Functional change: able to hold small light objects, able to make a loose composite fist    Pain: 5-6/10 pre-tx; 4/10 post-tx  Location: left elbow/wrist/hand      Objective     Sincere received the following direct contact modalities after being cleared for contradictions for 10 minutes:   -3.0 MHz, 1.0 W/cm2, continuous to R wrist surgical site, to decrease pain & edema, increase circulation and tissue extensibility     Sincere received the following manual therapy techniques for 25 minutes:   -Edema mgmt w/ lymphatic drainage technique;  Deep STM, including MFR, CFM, TPR & scar mgmt to L elbow/forearm/wrist/hand, using hand techniques and IASTM tools to increase blood flow/circulation, improve soft tissue pliability and decrease pain.  -Kinesiotaping: CTS pattern and Ulnar nerve decompression taping applied to L wrist/hand.     Sincere received therapeutic exercises for 20 minutes " including:    AROM    -wrist E/F, RD/UD 5 reps each   -thumb Rad Abd 5 reps   -thumb/pinky slides 5 reps       Prayer stretch 5 reps, hold 5 sec each    Dexterciser  3 min   Isospheres 2 min   9 Peg  1 trial, remove/replace    Pom pom  6 lg & 4 small       Home Exercises and Education Provided     Education provided:   - Reviewed initial HEP; added further scar mgmt techniques to HEP, including use of golf ball    Written Home Exercises Provided: Patient instructed to cont prior HEP.  Exercises were reviewed and Sincere was able to demonstrate them prior to the end of the session.  Sincere demonstrated good  understanding of the HEP provided.     See EMR under Patient Instructions for exercises provided prior visit.        Assessment     Patient noted to have a decrease in redness, edema and scar tissue density at R wrist surgical site today compared to initial evaluation.  She is tolerating treatment fairly well.      Sincere is progressing well towards her goals and there are no updates to goals at this time. Pt prognosis is Good.     Pt will continue to benefit from skilled outpatient occupational therapy to address the deficits listed in the problem list on initial evaluation provide pt/family education and to maximize pt's level of independence in the home and community environment.     Anticipated barriers to occupational therapy: pain    Pt's spiritual, cultural and educational needs considered and pt agreeable to plan of care and goals.    Goals:  Short Term Goals: (in 2 weeks-07/10/19))  1) Patient will be independent in HEP  2) Decrease pain in L elbow/wrist/hand to no more than 4-5/10 worst in ADL/IADL's   3) Increase AROM in L wrist Extension by 10 degrees for improved functioning in ADL/IADL's  4) Assess  & pinch strength  5) Decrease edema in L wrist by .2-.3 cm   6) Increase L IF-SF COSTA's by 10-15 degrees for increased functional use of hand in ADL/IADL's     Long Term Goals: (in 6  weeks-08/07/19)  1) Decrease pain in L elbow/wrist/hand to no more than 1-2/10 worst in ADL/IADL's   2) Increase AROM of L elbow/wrist/hand to WFL for increased functioning in ADL/IADL's  3) Increase strength in L  & pinch by 20% of initial measures for improved functioning in ADL/IADL's  4) Increased functioning in ADL/IADL's, as evidenced by a FOTO impairment rating of no more than 35%  5) Decrease edema in L elbow/wrist/hand to trace or none      Plan     Certification Period/Plan of care expiration: 6/27/2019 to 08/07/19.     Outpatient Occupational Therapy 2 times weekly for 6 weeks to include the following interventions: Paraffin, Manual therapy/joint mobilizations, Modalities for pain management, US 3 mhz, Therapeutic exercises/activities., Strengthening, Orthotic Fabrication/Fit/Training, Edema Control, Scar Management, Wound Care and Energy Conservation.    Updates/Grading for next session: begin light strenghtening      Joseph Carlin, OT

## 2019-07-09 ENCOUNTER — CLINICAL SUPPORT (OUTPATIENT)
Dept: REHABILITATION | Facility: HOSPITAL | Age: 59
End: 2019-07-09
Payer: COMMERCIAL

## 2019-07-09 DIAGNOSIS — M25.632 STIFFNESS OF LEFT WRIST JOINT: ICD-10-CM

## 2019-07-09 DIAGNOSIS — M79.602 LEFT ARM PAIN: ICD-10-CM

## 2019-07-09 DIAGNOSIS — M62.81 MUSCLE WEAKNESS: ICD-10-CM

## 2019-07-09 DIAGNOSIS — R20.9 SENSORY DISTURBANCE: ICD-10-CM

## 2019-07-09 PROCEDURE — 97140 MANUAL THERAPY 1/> REGIONS: CPT | Mod: PO

## 2019-07-09 PROCEDURE — 97110 THERAPEUTIC EXERCISES: CPT | Mod: PO

## 2019-07-09 PROCEDURE — 97035 APP MDLTY 1+ULTRASOUND EA 15: CPT | Mod: PO

## 2019-07-09 NOTE — PROGRESS NOTES
"  Occupational Therapy Daily Treatment Note     Date: 7/9/2019  Name: Sincere Riggins  Clinic Number: [id number]    Therapy Diagnosis:   Encounter Diagnoses   Name Primary?    Left arm pain     Stiffness of left wrist joint     Muscle weakness     Sensory disturbance      Physician: Vadim Harp Jr*    Physician Orders: Eval & Treat, post op care  Medical Diagnosis: L CTR & CuTR  Surgical Procedure and Date:  L CTR & CuTR, 06/07/19  Evaluation Date: 06/27/19  Insurance Authorization Period Expiration: 12/31/19  Plan of Care Certification Period: 06/27/19 to 08/07/19  Date of Return to MD: 07/25/19    Visit # / Visits authorized: 4 / 30  Time In: 2:00 pm  Time Out: 2:55 pm  Total Billable Time: 55 minutes (1US, 2MT, 1TE)    Precautions:  Standard      Subjective     Pt reports: "I still have a lot of pain in my hand, but I know it is getting better"  she was compliant with home exercise program given last session.   Response to previous treatment: decreased pain and increased AROM   Functional change: able to hold small light objects, able to make a loose composite fist    Pain: 5-6/10 pre-tx; 3-4/10 post-tx  Location: left elbow/wrist/hand      Objective     Sincere received the following direct contact modalities after being cleared for contradictions for 10 minutes:   -3.0 MHz, 1.0 W/cm2, continuous to R wrist surgical site, to decrease pain & edema, increase circulation and tissue extensibility     Sincere received the following manual therapy techniques for 25 minutes:   -Edema mgmt w/ lymphatic drainage technique;  Deep STM, including MFR, CFM, TPR & scar mgmt to L elbow/forearm/wrist/hand, using hand techniques and IASTM tools to increase blood flow/circulation, improve soft tissue pliability and decrease pain.  -Kinesiotaping: CTS pattern and Ulnar nerve decompression taping applied to L wrist/hand.     Sincere received therapeutic exercises for 20 minutes including:    AROM    -wrist E/F, RD/UD 5 reps each "   -thumb Rad Abd 5 reps   -thumb/pinky slides 5 reps       Prayer stretch 5 reps, hold 5 sec each    Dexterciser  3 min   Isospheres 2 min   9 Peg  1 trial, remove/replace    Pom pom  6 lg & 4 small       T-Putty Yellow/green   -Molding 2 min   -Grasp/manipulation 5 reps   -Log rolls 3 logs       Home Exercises and Education Provided     Education provided:   - Reviewed initial HEP; added further scar mgmt techniques to HEP, including use of golf ball    Written Home Exercises Provided: Patient instructed to cont prior HEP.  Exercises were reviewed and Sincere was able to demonstrate them prior to the end of the session.  Sincere demonstrated good  understanding of the HEP provided.     See EMR under Patient Instructions for exercises provided prior visit.        Assessment     Patient noted to have a decrease in redness, edema and scar tissue density at R wrist surgical site today compared to initial evaluation.  She is tolerating treatment fairly well.      Sincere is progressing well towards her goals and there are no updates to goals at this time. Pt prognosis is Good.     Pt will continue to benefit from skilled outpatient occupational therapy to address the deficits listed in the problem list on initial evaluation provide pt/family education and to maximize pt's level of independence in the home and community environment.     Anticipated barriers to occupational therapy: pain    Pt's spiritual, cultural and educational needs considered and pt agreeable to plan of care and goals.    Goals:  Short Term Goals: (in 2 weeks-07/10/19))  1) Patient will be independent in HEP  2) Decrease pain in L elbow/wrist/hand to no more than 4-5/10 worst in ADL/IADL's   3) Increase AROM in L wrist Extension by 10 degrees for improved functioning in ADL/IADL's  4) Assess  & pinch strength  5) Decrease edema in L wrist by .2-.3 cm   6) Increase L IF-SF COSTA's by 10-15 degrees for increased functional use of hand in ADL/IADL's      Long Term Goals: (in 6 weeks-08/07/19)  1) Decrease pain in L elbow/wrist/hand to no more than 1-2/10 worst in ADL/IADL's   2) Increase AROM of L elbow/wrist/hand to WFL for increased functioning in ADL/IADL's  3) Increase strength in L  & pinch by 20% of initial measures for improved functioning in ADL/IADL's  4) Increased functioning in ADL/IADL's, as evidenced by a FOTO impairment rating of no more than 35%  5) Decrease edema in L elbow/wrist/hand to trace or none      Plan     Certification Period/Plan of care expiration: 6/27/2019 to 08/07/19.     Outpatient Occupational Therapy 2 times weekly for 6 weeks to include the following interventions: Paraffin, Manual therapy/joint mobilizations, Modalities for pain management, US 3 mhz, Therapeutic exercises/activities., Strengthening, Orthotic Fabrication/Fit/Training, Edema Control, Scar Management, Wound Care and Energy Conservation.    Updates/Grading for next session: begin light strenghtening      Joseph Carlin, OT

## 2019-07-11 ENCOUNTER — CLINICAL SUPPORT (OUTPATIENT)
Dept: REHABILITATION | Facility: HOSPITAL | Age: 59
End: 2019-07-11
Payer: COMMERCIAL

## 2019-07-11 DIAGNOSIS — R20.9 SENSORY DISTURBANCE: ICD-10-CM

## 2019-07-11 DIAGNOSIS — M62.81 MUSCLE WEAKNESS: ICD-10-CM

## 2019-07-11 DIAGNOSIS — M79.602 LEFT ARM PAIN: ICD-10-CM

## 2019-07-11 DIAGNOSIS — M25.632 STIFFNESS OF LEFT WRIST JOINT: ICD-10-CM

## 2019-07-11 PROCEDURE — 97035 APP MDLTY 1+ULTRASOUND EA 15: CPT | Mod: PO

## 2019-07-11 PROCEDURE — 97140 MANUAL THERAPY 1/> REGIONS: CPT | Mod: PO

## 2019-07-11 PROCEDURE — 97110 THERAPEUTIC EXERCISES: CPT | Mod: PO

## 2019-07-11 NOTE — PROGRESS NOTES
"  Occupational Therapy Daily Treatment Note     Date: 7/11/2019  Name: Sincere Riggins  Clinic Number: [id number]    Therapy Diagnosis:   Encounter Diagnoses   Name Primary?    Left arm pain     Stiffness of left wrist joint     Muscle weakness     Sensory disturbance      Physician: Vadim Harp Jr*    Physician Orders: Eval & Treat, post op care  Medical Diagnosis: L CTR & CuTR  Surgical Procedure and Date:  L CTR & CuTR, 06/07/19  Evaluation Date: 06/27/19  Insurance Authorization Period Expiration: 12/31/19  Plan of Care Certification Period: 06/27/19 to 08/07/19  Date of Return to MD: 07/25/19    Visit # / Visits authorized: 5 / 30  Time In: 1:00 pm  Time Out:  2:05 pm  Total Billable Time: 65 minutes (1US, 2MT, 2TE)    Precautions:  Standard      Subjective     Pt reports: "I still have a lot of pain in my hand, but I know it is getting better"  she was compliant with home exercise program given last session.   Response to previous treatment: decreased pain and increased AROM   Functional change: able to hold small light objects, able to make a loose composite fist    Pain: 4-5/10 pre-tx; 3/10 post-tx  Location: left elbow/wrist/hand      Objective     Sincere received the following direct contact modalities after being cleared for contradictions for 10 minutes:   -3.0 MHz, 1.0 W/cm2, continuous to R wrist surgical site, to decrease pain & edema, increase circulation and tissue extensibility     Sincere received the following manual therapy techniques for 25 minutes:   -Edema mgmt w/ lymphatic drainage technique;  Deep STM, including MFR, CFM, TPR & scar mgmt to L elbow/forearm/wrist/hand, using hand techniques and IASTM tools to increase blood flow/circulation, improve soft tissue pliability and decrease pain.  -Kinesiotaping: CTS pattern and Ulnar nerve decompression taping applied to L wrist/hand.     Sincere received therapeutic exercises for 30 minutes including:    Prayer stretch 5 reps, hold 5 sec each  "   Dexterciser  3 min   Isospheres 2 min   9 Peg  1 trial, remove/replace    Pom pom  6 lg & 4 small       T-Putty Yellow/green   -Molding 3 min   -Graspmanipulation 5 reps   -Log rolls w/ tripod pinch 2 logs   -Extension blooms 3 reps   -dowel digs 2 min       Home Exercises and Education Provided     Education provided:   - Updated HEP; added T-putty ex and patient received a supply of yellow/green putty, see attached for details    Written Home Exercises Provided: Patient instructed to cont prior HEP.  Exercises were reviewed and Sincere was able to demonstrate them prior to the end of the session.  Sincere demonstrated good  understanding of the HEP provided.     See EMR under Patient Instructions for exercises provided prior visit.        Assessment     Edema and scar tissue continues to decrease in L wrist/hand with each treatment.  She is tolerating treatment fairly well.      Sincere is progressing well towards her goals and there are no updates to goals at this time. Pt prognosis is Good.     Pt will continue to benefit from skilled outpatient occupational therapy to address the deficits listed in the problem list on initial evaluation provide pt/family education and to maximize pt's level of independence in the home and community environment.     Anticipated barriers to occupational therapy: pain    Pt's spiritual, cultural and educational needs considered and pt agreeable to plan of care and goals.    Goals:  Short Term Goals: (in 2 weeks-07/10/19))  1) Patient will be independent in HEP  2) Decrease pain in L elbow/wrist/hand to no more than 4-5/10 worst in ADL/IADL's   3) Increase AROM in L wrist Extension by 10 degrees for improved functioning in ADL/IADL's  4) Assess  & pinch strength  5) Decrease edema in L wrist by .2-.3 cm   6) Increase L IF-SF COSTA's by 10-15 degrees for increased functional use of hand in ADL/IADL's     Long Term Goals: (in 6 weeks-08/07/19)  1) Decrease pain in L  elbow/wrist/hand to no more than 1-2/10 worst in ADL/IADL's   2) Increase AROM of L elbow/wrist/hand to WFL for increased functioning in ADL/IADL's  3) Increase strength in L  & pinch by 20% of initial measures for improved functioning in ADL/IADL's  4) Increased functioning in ADL/IADL's, as evidenced by a FOTO impairment rating of no more than 35%  5) Decrease edema in L elbow/wrist/hand to trace or none      Plan     Certification Period/Plan of care expiration: 6/27/2019 to 08/07/19.     Outpatient Occupational Therapy 2 times weekly for 6 weeks to include the following interventions: Paraffin, Manual therapy/joint mobilizations, Modalities for pain management, US 3 mhz, Therapeutic exercises/activities., Strengthening, Orthotic Fabrication/Fit/Training, Edema Control, Scar Management, Wound Care and Energy Conservation.    Updates/Grading for next session: begin light strenghtening      Joseph Cralin, OT

## 2019-07-16 ENCOUNTER — CLINICAL SUPPORT (OUTPATIENT)
Dept: REHABILITATION | Facility: HOSPITAL | Age: 59
End: 2019-07-16
Payer: COMMERCIAL

## 2019-07-16 DIAGNOSIS — M79.602 LEFT ARM PAIN: ICD-10-CM

## 2019-07-16 DIAGNOSIS — M62.81 MUSCLE WEAKNESS: ICD-10-CM

## 2019-07-16 DIAGNOSIS — R20.9 SENSORY DISTURBANCE: ICD-10-CM

## 2019-07-16 DIAGNOSIS — M25.632 STIFFNESS OF LEFT WRIST JOINT: ICD-10-CM

## 2019-07-16 PROCEDURE — 97110 THERAPEUTIC EXERCISES: CPT | Mod: PO

## 2019-07-16 PROCEDURE — 97035 APP MDLTY 1+ULTRASOUND EA 15: CPT | Mod: PO

## 2019-07-16 PROCEDURE — 97140 MANUAL THERAPY 1/> REGIONS: CPT | Mod: PO

## 2019-07-16 NOTE — PROGRESS NOTES
"  Occupational Therapy Daily Treatment Note     Date: 7/16/2019  Name: Sincere Riggins  Clinic Number: [id number]    Therapy Diagnosis:   Encounter Diagnoses   Name Primary?    Left arm pain     Stiffness of left wrist joint     Muscle weakness     Sensory disturbance      Physician: Vadim Harp Jr*    Physician Orders: Eval & Treat, post op care  Medical Diagnosis: L CTR & CuTR  Surgical Procedure and Date:  L CTR & CuTR, 06/07/19  Evaluation Date: 06/27/19  Insurance Authorization Period Expiration: 12/31/19  Plan of Care Certification Period: 06/27/19 to 08/07/19  Date of Return to MD: 07/25/19    Visit # / Visits authorized: 6 / 30  Time In: 1:05 pm  Time Out:  2:05 pm  Total Billable Time: 60 minutes (1US, 2MT, 2TE)    Precautions:  Standard      Subjective     Pt reports: "I had to take a pain pill over the weekend, the pain was so bad. There's more pain in my RF & SF than in my wrist"  she was compliant with home exercise program given last session.   Response to previous treatment: decreased pain and increased AROM   Functional change: able to hold small light objects, able to make a loose composite fist    Pain: 4-5/10 pre-tx; 3/10 post-tx  Location: left elbow/wrist/hand      Objective     Sincere received the following direct contact modalities after being cleared for contradictions for 10 minutes:   -3.0 MHz, 1.0 W/cm2, continuous to R wrist surgical site, to decrease pain & edema, increase circulation and tissue extensibility     Sincere received the following manual therapy techniques for 25 minutes:   -Edema mgmt w/ lymphatic drainage technique;  Deep STM, including MFR, CFM, TPR & scar mgmt to L elbow/forearm/wrist/hand, using hand techniques and IASTM tools to increase blood flow/circulation, improve soft tissue pliability and decrease pain.  -Kinesiotaping: CTS pattern and Ulnar nerve decompression taping applied to L wrist/hand.     Sincere received therapeutic exercises for 25 minutes " including:    Prayer stretch 5 reps, hold 5 sec each    Dexterciser  3 min   Isospheres 2 min   9 Peg  1 trial, remove/replace    Pom pom  6 lg & 4 small       T-Putty Yellow/green   -Molding 3 min   -Graspmanipulation 5 reps   -Log rolls w/ tripod pinch 2 logs   -Extension blooms (not today) 3 reps   -dowel digs (not today) 2 min       Home Exercises and Education Provided     Education provided:   - Updated HEP; added T-putty ex and patient received a supply of yellow/green putty, see attached for details    Written Home Exercises Provided: Patient instructed to cont prior HEP.  Exercises were reviewed and Sincere was able to demonstrate them prior to the end of the session.  Sincere demonstrated good  understanding of the HEP provided.     See EMR under Patient Instructions for exercises provided prior visit.        Assessment     Patient is making steady progress towards decreased edema and scar tissue density at surgical site.  Her AROM is improving as well, however, she continues to have a significant amount of pain that is affecting her functioning in ADL/IADL's.   She is tolerating treatment fairly well.  It is possible she may benefit from a different nerve pain medication. A return to her referring MD is recommended to address unresolved high pain level.     Pt prognosis is Good.     Pt will continue to benefit from skilled outpatient occupational therapy to address the deficits listed in the problem list on initial evaluation provide pt/family education and to maximize pt's level of independence in the home and community environment.     Anticipated barriers to occupational therapy: pain    Pt's spiritual, cultural and educational needs considered and pt agreeable to plan of care and goals.    Goals:  Short Term Goals: (in 2 weeks-07/10/19))  1) Patient will be independent in HEP  2) Decrease pain in L elbow/wrist/hand to no more than 4-5/10 worst in ADL/IADL's   3) Increase AROM in L wrist Extension by  10 degrees for improved functioning in ADL/IADL's  4) Assess  & pinch strength  5) Decrease edema in L wrist by .2-.3 cm   6) Increase L IF-SF COSTA's by 10-15 degrees for increased functional use of hand in ADL/IADL's     Long Term Goals: (in 6 weeks-08/07/19)  1) Decrease pain in L elbow/wrist/hand to no more than 1-2/10 worst in ADL/IADL's   2) Increase AROM of L elbow/wrist/hand to WFL for increased functioning in ADL/IADL's  3) Increase strength in L  & pinch by 20% of initial measures for improved functioning in ADL/IADL's  4) Increased functioning in ADL/IADL's, as evidenced by a FOTO impairment rating of no more than 35%  5) Decrease edema in L elbow/wrist/hand to trace or none      Plan     Certification Period/Plan of care expiration: 6/27/2019 to 08/07/19.     Outpatient Occupational Therapy 2 times weekly for 6 weeks to include the following interventions: Paraffin, Manual therapy/joint mobilizations, Modalities for pain management, US 3 mhz, Therapeutic exercises/activities., Strengthening, Orthotic Fabrication/Fit/Training, Edema Control, Scar Management, Wound Care and Energy Conservation.    Updates/Grading for next session: begin light strenghtening      Joseph Carlin, OT

## 2019-07-18 ENCOUNTER — CLINICAL SUPPORT (OUTPATIENT)
Dept: REHABILITATION | Facility: HOSPITAL | Age: 59
End: 2019-07-18
Payer: COMMERCIAL

## 2019-07-18 DIAGNOSIS — M62.81 MUSCLE WEAKNESS: ICD-10-CM

## 2019-07-18 DIAGNOSIS — M25.632 STIFFNESS OF LEFT WRIST JOINT: ICD-10-CM

## 2019-07-18 DIAGNOSIS — R20.9 SENSORY DISTURBANCE: ICD-10-CM

## 2019-07-18 DIAGNOSIS — M79.602 LEFT ARM PAIN: ICD-10-CM

## 2019-07-18 PROCEDURE — 97140 MANUAL THERAPY 1/> REGIONS: CPT | Mod: PO

## 2019-07-18 PROCEDURE — 97035 APP MDLTY 1+ULTRASOUND EA 15: CPT | Mod: PO

## 2019-07-18 PROCEDURE — 97110 THERAPEUTIC EXERCISES: CPT | Mod: PO

## 2019-07-18 NOTE — PROGRESS NOTES
"  Occupational Therapy Daily Treatment Note     Date: 7/18/2019  Name: Sincere Riggins  Clinic Number: [id number]    Therapy Diagnosis:   Encounter Diagnoses   Name Primary?    Left arm pain     Stiffness of left wrist joint     Muscle weakness     Sensory disturbance      Physician: Vadim Harp Jr*    Physician Orders: Eval & Treat, post op care  Medical Diagnosis: L CTR & CuTR  Surgical Procedure and Date:  L CTR & CuTR, 06/07/19  Evaluation Date: 06/27/19  Insurance Authorization Period Expiration: 12/31/19  Plan of Care Certification Period: 06/27/19 to 08/07/19  Date of Return to MD: 07/25/19    Visit # / Visits authorized: 7 / 30  Time In: 1:00 pm  Time Out:  2:00 pm  Total Billable Time: 60 minutes (1US, 2MT, 2TE)    Precautions:  Standard      Subjective     Pt reports: "I had a lot of pain last night and I wasn't able to sleep last night"  she was compliant with home exercise program given last session.   Response to previous treatment: decreased pain and increased AROM   Functional change: able to hold small light objects, able to make a loose composite fist    Pain: 5/10 pre-tx; 3/10 post-tx  Location: left elbow/wrist/hand      Objective     Sincere received the following direct contact modalities after being cleared for contradictions for 10 minutes:   -3.0 MHz, 1.0 W/cm2, continuous to R wrist surgical site, to decrease pain & edema, increase circulation and tissue extensibility     Sincere received the following manual therapy techniques for 25 minutes:   -Edema mgmt w/ lymphatic drainage technique;  Deep STM, including MFR, CFM, TPR & scar mgmt to L elbow/forearm/wrist/hand, using hand techniques and IASTM tools to increase blood flow/circulation, improve soft tissue pliability and decrease pain.  -Kinesiotaping: CTS pattern and Ulnar nerve decompression taping applied to L wrist/hand.     Sincere received therapeutic exercises for 25 minutes including:    Prayer stretch (not today) 5 reps, hold 5 " sec each    Dexterciser  3 min   Isospheres 2 min   9 Peg  1 trial, remove/replace    Pom pom  (not today)6 lg & 4 small       T-Putty Yellow/green   -Molding 3 min   -Graspmanipulation 5 reps   -Log rolls w/ tripod pinch 2 logs   -Extension blooms (not today) 3 reps   -dowel digs (not today) 2 min       Home Exercises and Education Provided     Education provided:   - benefits of compression gloves for managing pain and edema. Patient was provided information on obtaining compression gloves    Written Home Exercises Provided: Patient instructed to cont prior HEP.  Exercises were reviewed and Sincere was able to demonstrate them prior to the end of the session.  Sincere demonstrated good  understanding of the HEP provided.     See EMR under Patient Instructions for exercises provided prior visit.        Assessment     Patient continues to have a significant amount of pain, but it appears to mostly be at night, per patient report.  This pain is possibly of neural etiology.   She continues to have minimal to moderate edema and scar tissue density at surgical site, however, this has improved since initial evaluation.  She is tolerating treatment fairly well.      Pt prognosis is Good.     Pt will continue to benefit from skilled outpatient occupational therapy to address the deficits listed in the problem list on initial evaluation provide pt/family education and to maximize pt's level of independence in the home and community environment.     Anticipated barriers to occupational therapy: pain    Pt's spiritual, cultural and educational needs considered and pt agreeable to plan of care and goals.    Goals:  Short Term Goals: (in 2 weeks-07/10/19))  1) Patient will be independent in HEP  2) Decrease pain in L elbow/wrist/hand to no more than 4-5/10 worst in ADL/IADL's   3) Increase AROM in L wrist Extension by 10 degrees for improved functioning in ADL/IADL's  4) Assess  & pinch strength  5) Decrease edema in L  wrist by .2-.3 cm   6) Increase L IF-SF COSTA's by 10-15 degrees for increased functional use of hand in ADL/IADL's     Long Term Goals: (in 6 weeks-08/07/19)  1) Decrease pain in L elbow/wrist/hand to no more than 1-2/10 worst in ADL/IADL's   2) Increase AROM of L elbow/wrist/hand to WFL for increased functioning in ADL/IADL's  3) Increase strength in L  & pinch by 20% of initial measures for improved functioning in ADL/IADL's  4) Increased functioning in ADL/IADL's, as evidenced by a FOTO impairment rating of no more than 35%  5) Decrease edema in L elbow/wrist/hand to trace or none      Plan     Certification Period/Plan of care expiration: 6/27/2019 to 08/07/19.     Outpatient Occupational Therapy 2 times weekly for 6 weeks to include the following interventions: Paraffin, Manual therapy/joint mobilizations, Modalities for pain management, US 3 mhz, Therapeutic exercises/activities., Strengthening, Orthotic Fabrication/Fit/Training, Edema Control, Scar Management, Wound Care and Energy Conservation.    Updates/Grading for next session: begin light strenghtening      Joseph Carlin, OT

## 2019-07-24 ENCOUNTER — CLINICAL SUPPORT (OUTPATIENT)
Dept: REHABILITATION | Facility: HOSPITAL | Age: 59
End: 2019-07-24
Payer: COMMERCIAL

## 2019-07-24 DIAGNOSIS — M79.602 LEFT ARM PAIN: ICD-10-CM

## 2019-07-24 DIAGNOSIS — M25.632 STIFFNESS OF LEFT WRIST JOINT: ICD-10-CM

## 2019-07-24 DIAGNOSIS — R20.9 SENSORY DISTURBANCE: ICD-10-CM

## 2019-07-24 DIAGNOSIS — M62.81 MUSCLE WEAKNESS: ICD-10-CM

## 2019-07-24 PROCEDURE — 97035 APP MDLTY 1+ULTRASOUND EA 15: CPT | Mod: PO

## 2019-07-24 PROCEDURE — 97110 THERAPEUTIC EXERCISES: CPT | Mod: PO

## 2019-07-24 PROCEDURE — 97140 MANUAL THERAPY 1/> REGIONS: CPT | Mod: PO

## 2019-07-24 NOTE — PROGRESS NOTES
"  Occupational Therapy Daily Treatment Note     Date: 7/24/2019  Name: Sincere Riggins  Clinic Number: [id number]    Therapy Diagnosis:   Encounter Diagnoses   Name Primary?    Left arm pain     Stiffness of left wrist joint     Muscle weakness     Sensory disturbance      Physician: Vadim Harp Jr*    Physician Orders: Eval & Treat, post op care  Medical Diagnosis: L CTR & CuTR  Surgical Procedure and Date:  L CTR & CuTR, 06/07/19  Evaluation Date: 06/27/19  Insurance Authorization Period Expiration: 12/31/19  Plan of Care Certification Period: 06/27/19 to 08/07/19  Date of Return to MD: 07/25/19    Visit # / Visits authorized: 8 / 30  Time In: 1:05 pm  Time Out:  2:05 pm  Total Billable Time: 60 minutes (1US, 2MT, 2TE)    Precautions:  Standard      Subjective     Pt reports: "It's an aggravating type pain".  Patient does report she has been wearing compression gloves and feels it is helping with edema mgmt in her L wrist/hand  she was compliant with home exercise program given last session.   Response to previous treatment: decreased pain and increased AROM   Functional change: able to hold small light objects, able to make a loose composite fist    Pain: 4-5/10 pre-tx; 3/10 post-tx  Location: left elbow/wrist/hand      Objective     Sincere received the following direct contact modalities after being cleared for contradictions for 10 minutes:   -3.0 MHz, 1.0 W/cm2, continuous to R wrist surgical site, to decrease pain & edema, increase circulation and tissue extensibility     Sincere received the following manual therapy techniques for 25 minutes:   -Edema mgmt w/ lymphatic drainage technique;  Deep STM, including MFR, CFM, TPR & scar mgmt to L elbow/forearm/wrist/hand, using hand techniques and IASTM tools to increase blood flow/circulation, improve soft tissue pliability and decrease pain.    Sincere received therapeutic exercises for 25 minutes including:    Dexterciser  3 min   Isospheres 3 min   9 Peg  1 " trial, remove/replace    Smiles & Frowns 10 reps each way, red T-bar   Biceps-3 ways 10 reps each way, 2# wt   Wrist 3 ways 10 reps each way, 1# wt   Ergo gripper 2/10 reps, 3 red & 1 yellow bands       T-Putty Yellow/green   -Molding (not today) 3 min   -Graspmanipulation (not today) 5 reps   -Log rolls w/ tripod pinch (not today) 2 logs   -Extension blooms (not today) 3 reps   -dowel digs 2 min       Home Exercises and Education Provided     Education provided:   - patient was provided a blue foam ball to add to her current HEP;     Written Home Exercises Provided: Patient instructed to cont prior HEP.  Exercises were reviewed and Sincere was able to demonstrate them prior to the end of the session.  Sinceer demonstrated good  understanding of the HEP provided.     See EMR under Patient Instructions for exercises provided prior visit.        Assessment     Patient's pain has not changed much over the last few treatment sessions, however, edema and scar tissue is decreasing.  She is also able to tolerate more resistive activities in therapy without increased pain.  Recommend to continue OT per poc & progress, as indicated.  Patient does have a follow up MD appointment tomorrow.  She is tolerating treatment fairly well.      Pt prognosis is Good.     Pt will continue to benefit from skilled outpatient occupational therapy to address the deficits listed in the problem list on initial evaluation provide pt/family education and to maximize pt's level of independence in the home and community environment.     Anticipated barriers to occupational therapy: pain    Pt's spiritual, cultural and educational needs considered and pt agreeable to plan of care and goals.    Goals:  Short Term Goals: (in 2 weeks-07/10/19))  1) Patient will be independent in HEP  2) Decrease pain in L elbow/wrist/hand to no more than 4-5/10 worst in ADL/IADL's   3) Increase AROM in L wrist Extension by 10 degrees for improved functioning in  ADL/IADL's  4) Assess  & pinch strength  5) Decrease edema in L wrist by .2-.3 cm   6) Increase L IF-SF COSTA's by 10-15 degrees for increased functional use of hand in ADL/IADL's     Long Term Goals: (in 6 weeks-08/07/19)  1) Decrease pain in L elbow/wrist/hand to no more than 1-2/10 worst in ADL/IADL's   2) Increase AROM of L elbow/wrist/hand to WFL for increased functioning in ADL/IADL's  3) Increase strength in L  & pinch by 20% of initial measures for improved functioning in ADL/IADL's  4) Increased functioning in ADL/IADL's, as evidenced by a FOTO impairment rating of no more than 35%  5) Decrease edema in L elbow/wrist/hand to trace or none      Plan     Certification Period/Plan of care expiration: 6/27/2019 to 08/07/19.     Outpatient Occupational Therapy 2 times weekly for 6 weeks to include the following interventions: Paraffin, Manual therapy/joint mobilizations, Modalities for pain management, US 3 mhz, Therapeutic exercises/activities., Strengthening, Orthotic Fabrication/Fit/Training, Edema Control, Scar Management, Wound Care and Energy Conservation.    Updates/Grading for next session: begin light strenghtsantiago Carlin, OT

## 2019-07-25 ENCOUNTER — OFFICE VISIT (OUTPATIENT)
Dept: ORTHOPEDICS | Facility: CLINIC | Age: 59
End: 2019-07-25
Payer: COMMERCIAL

## 2019-07-25 VITALS
DIASTOLIC BLOOD PRESSURE: 77 MMHG | WEIGHT: 125 LBS | HEART RATE: 75 BPM | BODY MASS INDEX: 21.34 KG/M2 | SYSTOLIC BLOOD PRESSURE: 123 MMHG | HEIGHT: 64 IN

## 2019-07-25 DIAGNOSIS — Z98.890 STATUS POST SURGERY: Primary | ICD-10-CM

## 2019-07-25 PROCEDURE — 99024 PR POST-OP FOLLOW-UP VISIT: ICD-10-PCS | Mod: S$GLB,,, | Performed by: PLASTIC SURGERY

## 2019-07-25 PROCEDURE — 99024 POSTOP FOLLOW-UP VISIT: CPT | Mod: S$GLB,,, | Performed by: PLASTIC SURGERY

## 2019-07-25 PROCEDURE — 99999 PR PBB SHADOW E&M-EST. PATIENT-LVL III: CPT | Mod: PBBFAC,,, | Performed by: PLASTIC SURGERY

## 2019-07-25 PROCEDURE — 99999 PR PBB SHADOW E&M-EST. PATIENT-LVL III: ICD-10-PCS | Mod: PBBFAC,,, | Performed by: PLASTIC SURGERY

## 2019-07-25 NOTE — PROGRESS NOTES
"Ms. Riggins is here today for a post-operative visit.she is approximately 6 weekstatus post left ulnar nerve decompression and anterior transposition. she reports that she is doing better.  Pain is a different sensation that she was experiencing previously.  She feels that she has more sensation along the ulnar aspect of her forearm she continues to have dense numbness within the ulnar distribution within the hand.  She denies severe pain she describes it as a nagging or uncomfortable feeling.  She is able to perform daily activities and she is currently in therapy.  she is not taking pain medication. she denies fever, chills, and sweats since the time of the surgery.     Physical exam:    Vitals:    07/25/19 1335   BP: 123/77   Pulse: 75   Weight: 56.7 kg (125 lb)   Height: 5' 4" (1.626 m)   PainSc:   6   PainLoc: Hand     Incisions are well healed  There is no erythema or exudate.  There is no sign of any infection.  FDP to left small and ring finger intact weak intrinsics decreased sensation ulnar distribution advancing Tinel's along the ulnar nerve approximately 6 cm distal to the elbow      Assessment:  Status post surgery    Plan:  There are no diagnoses linked to this encounter.    - patient demonstrates improvement 6 weeks out from surgery. There is an advancing Tinel's distal to the elbow consistent with nerve regeneration.  I discussed the nerve regeneration process with the patient detail and what to expect over the next several months.  I am optimistic that she should regain some sensation in the hand.  She should continue with her therapy sessions as scheduled.  I would like to see her back in 3 months.  She was advised to call clinic if she has any worsening symptoms or questions or concerns    "

## 2019-07-30 ENCOUNTER — CLINICAL SUPPORT (OUTPATIENT)
Dept: REHABILITATION | Facility: HOSPITAL | Age: 59
End: 2019-07-30
Payer: COMMERCIAL

## 2019-07-30 DIAGNOSIS — M62.81 MUSCLE WEAKNESS: ICD-10-CM

## 2019-07-30 DIAGNOSIS — R20.9 SENSORY DISTURBANCE: ICD-10-CM

## 2019-07-30 DIAGNOSIS — M79.602 LEFT ARM PAIN: ICD-10-CM

## 2019-07-30 DIAGNOSIS — M25.632 STIFFNESS OF LEFT WRIST JOINT: ICD-10-CM

## 2019-07-30 PROCEDURE — 97140 MANUAL THERAPY 1/> REGIONS: CPT | Mod: PO

## 2019-07-30 PROCEDURE — 97018 PARAFFIN BATH THERAPY: CPT | Mod: PO

## 2019-07-30 PROCEDURE — 97110 THERAPEUTIC EXERCISES: CPT | Mod: PO

## 2019-07-30 NOTE — PROGRESS NOTES
"  Occupational Therapy Daily Treatment Note     Date: 7/30/2019  Name: Sincere Riggins  Clinic Number: [id number]    Therapy Diagnosis:   Encounter Diagnoses   Name Primary?    Left arm pain     Stiffness of left wrist joint     Muscle weakness     Sensory disturbance      Physician: Vadim Harp Jr*    Physician Orders: Eval & Treat, post op care  Medical Diagnosis: L CTR & CuTR  Surgical Procedure and Date:  L CTR & CuTR, 06/07/19  Evaluation Date: 06/27/19  Insurance Authorization Period Expiration: 12/31/19  Plan of Care Certification Period: 06/27/19 to 08/07/19  Date of Return to MD: 07/25/19    Visit # / Visits authorized: 9 / 30  Time In: 1:00 pm  Time Out:  2:00 pm  Total Billable Time: 60 minutes (P, 1MT, 2TE)    Precautions:  Standard      Subjective     Pt reports: "I can tell it's getting better. I'm not hurting as much"  she was compliant with home exercise program given last session.   Response to previous treatment: decreased pain and increased AROM   Functional change: able to hold small light objects, able to make a loose composite fist    Pain:  4/10 pre-tx; 3/10 post-tx  Location: left elbow/wrist/hand      Objective     Sincere received the following supervised modalities after being cleared for contradictions for 10 minutes:   -Paraffin w/ MHP to L wrist/hand, pre-tx to decrease pain & increase tissue extensibility    Sincere received the following manual therapy techniques for 20 minutes:   -Edema mgmt w/ lymphatic drainage technique;  Deep STM, including MFR, CFM, TPR & scar mgmt to L elbow/forearm/wrist/hand, using hand techniques and IASTM tools to increase blood flow/circulation, improve soft tissue pliability and decrease pain.    Sincere received therapeutic exercises for 30 minutes including:    Dexterciser  3 min   Isospheres 3 min   9 Peg  (not today) 1 trial, remove/replace    Smiles & Frowns (not today) 10 reps each way, red T-bar   Biceps-3 ways (not today) 10 reps each way, 2# wt "   Wrist 3 ways 10 reps each way, 1# wt   Ergo gripper (not today) 2/10 reps, 3 red & 1 yellow bands   Babak Grooved Pegs/Pegboard 2 trials 5 pegs, removed and replacing    T-Putty Yellow/green   -Molding 3 min   -Graspmanipulation 5 reps   -Log rolls w/ tripod pinch 2 logs   -Extension blooms 3 reps   -dowel digs (not today) 3 min   -intrinsic cones 2 reps   -pancakes 2 rpes       Home Exercises and Education Provided     Education provided:   - Reviewed HEP;     Written Home Exercises Provided: Patient instructed to cont prior HEP.  Exercises were reviewed and Sincere was able to demonstrate them prior to the end of the session.  Sincere demonstrated good  understanding of the HEP provided.     See EMR under Patient Instructions for exercises provided prior visit.        Assessment     Patient noted to have less pain today compared to last few treatments.  She is noted to have less edema and scar tissue, however, this continues to be problematic.  She is making steady progress towards goals set for increased ROM, strength and coordination of LUE.  Recommend to continue OT per poc & progress, as indicated.  She is tolerating treatment better.      Pt prognosis is Good.     Pt will continue to benefit from skilled outpatient occupational therapy to address the deficits listed in the problem list on initial evaluation provide pt/family education and to maximize pt's level of independence in the home and community environment.     Anticipated barriers to occupational therapy: pain    Pt's spiritual, cultural and educational needs considered and pt agreeable to plan of care and goals.    Goals:  Short Term Goals: (in 2 weeks-07/10/19))  1) Patient will be independent in HEP  2) Decrease pain in L elbow/wrist/hand to no more than 4-5/10 worst in ADL/IADL's   3) Increase AROM in L wrist Extension by 10 degrees for improved functioning in ADL/IADL's  4) Assess  & pinch strength  5) Decrease edema in L wrist by .2-.3 cm   6)  Increase L IF-SF COSTA's by 10-15 degrees for increased functional use of hand in ADL/IADL's     Long Term Goals: (in 6 weeks-08/07/19)  1) Decrease pain in L elbow/wrist/hand to no more than 1-2/10 worst in ADL/IADL's   2) Increase AROM of L elbow/wrist/hand to WFL for increased functioning in ADL/IADL's  3) Increase strength in L  & pinch by 20% of initial measures for improved functioning in ADL/IADL's  4) Increased functioning in ADL/IADL's, as evidenced by a FOTO impairment rating of no more than 35%  5) Decrease edema in L elbow/wrist/hand to trace or none      Plan     Certification Period/Plan of care expiration: 6/27/2019 to 08/07/19.     Outpatient Occupational Therapy 2 times weekly for 6 weeks to include the following interventions: Paraffin, Manual therapy/joint mobilizations, Modalities for pain management, US 3 mhz, Therapeutic exercises/activities., Strengthening, Orthotic Fabrication/Fit/Training, Edema Control, Scar Management, Wound Care and Energy Conservation.    Updates/Grading for next session: begin light strenghtening      Joseph Carlin, OT

## 2019-08-01 ENCOUNTER — CLINICAL SUPPORT (OUTPATIENT)
Dept: REHABILITATION | Facility: HOSPITAL | Age: 59
End: 2019-08-01
Payer: COMMERCIAL

## 2019-08-01 DIAGNOSIS — R20.9 SENSORY DISTURBANCE: ICD-10-CM

## 2019-08-01 DIAGNOSIS — M62.81 MUSCLE WEAKNESS: ICD-10-CM

## 2019-08-01 DIAGNOSIS — M79.602 LEFT ARM PAIN: ICD-10-CM

## 2019-08-01 DIAGNOSIS — M25.632 STIFFNESS OF LEFT WRIST JOINT: ICD-10-CM

## 2019-08-01 PROCEDURE — 97140 MANUAL THERAPY 1/> REGIONS: CPT | Mod: PO

## 2019-08-01 PROCEDURE — 97110 THERAPEUTIC EXERCISES: CPT | Mod: PO

## 2019-08-01 PROCEDURE — 97035 APP MDLTY 1+ULTRASOUND EA 15: CPT | Mod: PO

## 2019-08-01 NOTE — PROGRESS NOTES
"  Occupational Therapy Daily Treatment Note     Date: 8/1/2019  Name: Sincere Riggins  Clinic Number: [id number]    Therapy Diagnosis:   Encounter Diagnoses   Name Primary?    Left arm pain     Stiffness of left wrist joint     Muscle weakness     Sensory disturbance      Physician: Vadim Harp Jr*    Physician Orders: Eval & Treat, post op care  Medical Diagnosis: L CTR & CuTR  Surgical Procedure and Date:  L CTR & CuTR, 06/07/19  Evaluation Date: 06/27/19  Insurance Authorization Period Expiration: 12/31/19  Plan of Care Certification Period: 06/27/19 to 08/07/19  Date of Return to MD: 07/25/19    Visit # / Visits authorized: 10 / 30  Time In: 1:05 pm  Time Out:  2:00 pm  Total Billable Time: 60 minutes (P, 1MT, 2TE)    Precautions:  Standard      Subjective     Pt reports: "The pain is "  she was compliant with home exercise program given last session.   Response to previous treatment: decreased pain and increased AROM   Functional change: able to hold small light objects, able to make a loose composite fist    Pain:  4/10 pre-tx; 3/10 post-tx  Location: left elbow/wrist/hand      Objective     Sincere received the following supervised modalities after being cleared for contradictions for 10 minutes:   -Paraffin w/ MHP to L wrist/hand, pre-tx to decrease pain & increase tissue extensibility    Sincere received the following manual therapy techniques for 15 minutes:   -Edema mgmt w/ lymphatic drainage technique;  Deep STM, including MFR, CFM, TPR & scar mgmt to L elbow/forearm/wrist/hand, using hand techniques and IASTM tools to increase blood flow/circulation, improve soft tissue pliability and decrease pain.    Sincere received therapeutic exercises for 30 minutes including:    Dexterciser  3 min   Isospheres 3 min   9 Peg  3 trial, remove/replace    Smiles & Frowns 2/10 reps each way, red T-bar   Biceps-3 ways (not today) 10 reps each way, 2# wt   Wrist 3 ways (not today) 10 reps each way, 1# wt   Ergo gripper " 3/10 reps, 3 red & 1 yellow bands   Babak Grooved Pegs/Pegboard (not today) 2 trials 5 pegs, removed and replacing    T-Putty Yellow/green   -Molding 3 min   -Graspmanipulation 3 reps   -Log rolls w/ tripod pinch 2 logs   -Extension blooms 3 reps   -dowel digs 3 min   -intrinsic cones (not today) 2 reps   -pancakes (not today) 2 reps       Home Exercises and Education Provided     Education provided:   - Reviewed HEP;     Written Home Exercises Provided: Patient instructed to cont prior HEP.  Exercises were reviewed and Sincere was able to demonstrate them prior to the end of the session.  Sincere demonstrated good  understanding of the HEP provided.     See EMR under Patient Instructions for exercises provided prior visit.        Assessment     Patient continues to have less edema and scar tissue in L hand with each treatment.  She is tolerating progression of treatment well.      Pt prognosis is Good.     Pt will continue to benefit from skilled outpatient occupational therapy to address the deficits listed in the problem list on initial evaluation provide pt/family education and to maximize pt's level of independence in the home and community environment.     Anticipated barriers to occupational therapy: pain    Pt's spiritual, cultural and educational needs considered and pt agreeable to plan of care and goals.    Goals:  Short Term Goals: (in 2 weeks-07/10/19))  1) Patient will be independent in HEP  2) Decrease pain in L elbow/wrist/hand to no more than 4-5/10 worst in ADL/IADL's   3) Increase AROM in L wrist Extension by 10 degrees for improved functioning in ADL/IADL's  4) Assess  & pinch strength  5) Decrease edema in L wrist by .2-.3 cm   6) Increase L IF-SF COSTA's by 10-15 degrees for increased functional use of hand in ADL/IADL's     Long Term Goals: (in 6 weeks-08/07/19)  1) Decrease pain in L elbow/wrist/hand to no more than 1-2/10 worst in ADL/IADL's   2) Increase AROM of L elbow/wrist/hand to WFL for  increased functioning in ADL/IADL's  3) Increase strength in L  & pinch by 20% of initial measures for improved functioning in ADL/IADL's  4) Increased functioning in ADL/IADL's, as evidenced by a FOTO impairment rating of no more than 35%  5) Decrease edema in L elbow/wrist/hand to trace or none      Plan     Certification Period/Plan of care expiration: 6/27/2019 to 08/07/19.     Outpatient Occupational Therapy 2 times weekly for 6 weeks to include the following interventions: Paraffin, Manual therapy/joint mobilizations, Modalities for pain management, US 3 mhz, Therapeutic exercises/activities., Strengthening, Orthotic Fabrication/Fit/Training, Edema Control, Scar Management, Wound Care and Energy Conservation.    Updates/Grading for next session: begin light strenghtsantiago Carlin, OT

## 2019-08-06 ENCOUNTER — CLINICAL SUPPORT (OUTPATIENT)
Dept: REHABILITATION | Facility: HOSPITAL | Age: 59
End: 2019-08-06
Payer: COMMERCIAL

## 2019-08-06 DIAGNOSIS — M25.632 STIFFNESS OF LEFT WRIST JOINT: ICD-10-CM

## 2019-08-06 DIAGNOSIS — R20.9 SENSORY DISTURBANCE: ICD-10-CM

## 2019-08-06 DIAGNOSIS — M62.81 MUSCLE WEAKNESS: ICD-10-CM

## 2019-08-06 DIAGNOSIS — M79.602 LEFT ARM PAIN: ICD-10-CM

## 2019-08-06 PROCEDURE — 97035 APP MDLTY 1+ULTRASOUND EA 15: CPT | Mod: PO

## 2019-08-06 PROCEDURE — 97140 MANUAL THERAPY 1/> REGIONS: CPT | Mod: PO

## 2019-08-06 PROCEDURE — 97110 THERAPEUTIC EXERCISES: CPT | Mod: PO

## 2019-08-06 NOTE — PROGRESS NOTES
Outpatient Therapy Updated Plan of Care     Visit Date: 8/6/2019  Name: Sincere Riggins  Clinic Number: [id number]    Therapy Diagnosis:   Encounter Diagnoses   Name Primary?    Left arm pain     Stiffness of left wrist joint     Muscle weakness     Sensory disturbance      Physician: Vadim Harp Jr*    Physician Orders: Eval & Treat, post op care  Medical Diagnosis: L CTR & CuTR  Surgical Procedure and Date:  L CTR & CuTR, 06/07/19  Evaluation Date: 06/27/19  Insurance Authorization Period Expiration: 12/31/19    Total Visits Received: 11  Cancelled Visits: 0  No Show Visits: 0    Initial Certification Period:  6/27/2019 to 08/07/19.  Precautions:  Standard  Visits from Evaluation Date:  11  Functional Level Prior to Evaluation:  Independent    Subjective     Update: Patient feels condition is improving, but she continues to have high levels of pain in her L wrist/hand.      Objective     Update: Objective measures taken today, as follows:    Edema. Measured in centimeters.    6/27/2019 6/27/2019 08/06/19     Left Right Left   2in. Above elbow 25.1 23.5 24.1   2in. Below elbow 21.8 23.0 22.1   Elbow Crease 24.5 23.2 23.7   Wrist Crease 15.6 15.0 15.3   Mid palm 18.7 19.8 18.0   MCPs 18.5 18.8 18.9      ROM:   06/27/19 06/27/19 08/06/19     Left  Right Left   Elbow E/F -20/149 WNL -12/149   Forearm Sup/pron WFL/WFL WNL 85/75   Wrist E/F 56/65 WNL 60/65   Wrist RD/UD 21/25 WNL 16/30   Thumb R/P Abd 42/40 WNL 44/44   Thumb MP flex 55 WNL 60   Thumb IP flex 53 WNL 63   Thumb [place] To base of SF WNL To SF MCP         Hand ROM. Measured in degrees.    6/27/2019 6/27/2019 08/06/19     Left Right Left            Index: MP  0/71 WNL 0/76              PIP     0/94 WNL 0/114              DIP 0/55 WNL 0/65              COSTA 220  (+35)            Long:  MP 0/84 WNL 0/79              PIP 0/96 WNL -14/99              DIP 0/33 WNL 0/46              COSTA 213  (-3)            Ring:   MP 0/80 WNL 0/90               PIP -25/87 WNL -34/92              DIP 0/31 WNL 0/40              COSTA 173  (+15)            Small:  MP 0/71 WNL 0/75               PIP -35/75 WNL -36/82               DIP 0/34 WNL 0/40              COSTA 145  (+16)      Sensation  Patient reports only having Numbness in L RF/SF to wrist.  Tingling has resolved.       Strength (Dyanmometer) and Pinch Strength (Pinch Gauge): ( strength was deferred at initial eval 2/2 recent post op)  Measured in pounds and psi.    08/06/19 08/06/19     Left Right   Rung II 12 43   Key Pinch 1 8   3pt Pinch 1 8   2pt Pinch .5 4        Assessment     Update: Patient has made good progress towards goals set for decreased edema, increased ROM, strength and coordination in L elbow/wrist/hand, however, her pain level remains high.  She presents with less scar tissue density and no adhesions, but this remains a problem.  She has good potential for further improvement in condition with continued OT.     Previous Short Term Goals Status:  4/5 STG's have been met with 2 part met   Goals:  Short Term Goals: (in 2 weeks-07/10/19))  1) Patient will be independent in HEP-met  2) Decrease pain in L elbow/wrist/hand to no more than 4-5/10 worst in ADL/IADL's-part met  3) Increase AROM in L wrist Extension by 10 degrees for improved functioning in ADL/IADL's-part met  4) Assess  & pinch strength-met  5) Decrease edema in L wrist by .2-.3 cm-met   6) Increase L IF-SF COSTA's by 10-15 degrees for increased functional use of hand in ADL/IADL's-met     Long Term Goals: (in 6 weeks-08/07/19)  1) Decrease pain in L elbow/wrist/hand to no more than 1-2/10 worst in ADL/IADL's   2) Increase AROM of L elbow/wrist/hand to WFL for increased functioning in ADL/IADL's  3) Increase strength in L  & pinch by 20% of initial measures for improved functioning in ADL/IADL's  4) Increased functioning in ADL/IADL's, as evidenced by a FOTO impairment rating of no more than 35%  5) Decrease edema  "in L elbow/wrist/hand to trace or none    New Short Term Goals Status:   Continue unmet STG's and progress to LTG's  Long Term Goal Status:   continue per initial plan of care.  Reasons for Recertification of Therapy:   Updated Plan of Care needed    Plan     Updated Certification Period: 8/6/2019 to 09/20/19  Recommended Treatment Plan: 2 times per week for 6 weeks: Electrical Stimulation IFC, Manual Therapy, Moist Heat/ Ice, Neuromuscular Re-ed, Orthotic Management and Training, Paraffin, Patient Education, Self Care, Therapeutic Activites, Therapeutic Exercise and Ultrasound  Other Recommendations: Patient may benefit from a custom L RF/SF orthosis to address developing Boutonierre deformity    Joseph Carlin, IZAIAH  8/6/2019      I CERTIFY THE NEED FOR THESE SERVICES FURNISHED UNDER THIS PLAN OF TREATMENT AND WHILE UNDER MY CARE    Physician's comments:        Physician's Signature: ___________________________________________________    Occupational Therapy Daily Treatment Note     Date: 8/6/2019  Name: Sincere Riggins  Clinic Number: [id number]    Therapy Diagnosis:   Encounter Diagnoses   Name Primary?    Left arm pain     Stiffness of left wrist joint     Muscle weakness     Sensory disturbance      Physician: Vadim Harp Jr*      Visit # / Visits authorized: 11 / 30  Time In: 1:05 pm  Time Out:  2:00 pm  Total Billable Time: 55 minutes (1US, 1MT, 2TE)    Precautions:  Standard      Subjective     Pt reports: "The pain is really bad now. Maybe it's because I am back at work "  she was compliant with home exercise program given last session.   Response to previous treatment: decreased pain and increased AROM   Functional change: able to hold small light objects, able to make a loose composite fist    Pain:  5-6/10 pre-tx; 4-5/10 post-tx  Location: left elbow/wrist/hand      Objective     Sincere received the following supervised modalities after being cleared for contradictions for 10 minutes:   -Paraffin " w/ MHP to L wrist/hand, pre-tx to decrease pain & increase tissue extensibility    Sincere received the following manual therapy techniques for 15 minutes:   -Edema mgmt w/ lymphatic drainage technique;  Deep STM, including MFR, CFM, TPR & scar mgmt to L elbow/forearm/wrist/hand, using hand techniques and IASTM tools to increase blood flow/circulation, improve soft tissue pliability and decrease pain.    Sincere received therapeutic exercises for 30 minutes including:    Objective measures taken today, see above for details  Dexterciser  3 min   Isospheres 3 min   9 Peg  3 trial, remove/replace    T-Putty Yellow/green   -Molding 3 min   -Graspmanipulation 3 reps   -Log rolls w/ tripod pinch 2 logs       Home Exercises and Education Provided     Education provided:   - Reviewed HEP;     Written Home Exercises Provided: Patient instructed to cont prior HEP.  Exercises were reviewed and Sincere was able to demonstrate them prior to the end of the session.  Sincere demonstrated good  understanding of the HEP provided.     See EMR under Patient Instructions for exercises provided prior visit.        Assessment     See above assessment for details on patient's progress    Pt prognosis is Good.     Pt will continue to benefit from skilled outpatient occupational therapy to address the deficits listed in the problem list on initial evaluation provide pt/family education and to maximize pt's level of independence in the home and community environment.     Anticipated barriers to occupational therapy: pain    Pt's spiritual, cultural and educational needs considered and pt agreeable to plan of care and goals.    Plan     See above Plan for details        Joseph Carlin, OT

## 2019-08-08 ENCOUNTER — CLINICAL SUPPORT (OUTPATIENT)
Dept: REHABILITATION | Facility: HOSPITAL | Age: 59
End: 2019-08-08
Payer: COMMERCIAL

## 2019-08-08 DIAGNOSIS — R20.9 SENSORY DISTURBANCE: ICD-10-CM

## 2019-08-08 DIAGNOSIS — M25.632 STIFFNESS OF LEFT WRIST JOINT: ICD-10-CM

## 2019-08-08 DIAGNOSIS — M62.81 MUSCLE WEAKNESS: ICD-10-CM

## 2019-08-08 DIAGNOSIS — M79.602 LEFT ARM PAIN: ICD-10-CM

## 2019-08-08 PROCEDURE — 97035 APP MDLTY 1+ULTRASOUND EA 15: CPT | Mod: PO

## 2019-08-08 PROCEDURE — 97760 ORTHOTIC MGMT&TRAING 1ST ENC: CPT | Mod: PO

## 2019-08-08 PROCEDURE — 97110 THERAPEUTIC EXERCISES: CPT | Mod: PO,59

## 2019-08-08 NOTE — PROGRESS NOTES
"  Occupational Therapy Daily Treatment Note     Date: 8/8/2019  Name: Sincere Riggins  Clinic Number: [id number]    Therapy Diagnosis:   Encounter Diagnoses   Name Primary?    Left arm pain     Stiffness of left wrist joint     Muscle weakness     Sensory disturbance      Physician: Vadim Harp Jr*    Physician Orders: Eval & Treat, post op care  Medical Diagnosis: L CTR & CuTR  Surgical Procedure and Date:  L CTR & CuTR, 06/07/19  Evaluation Date: 06/27/19  Insurance Authorization Period Expiration: 12/31/19      Visit # / Visits authorized: 12 / 30  Time In: 1:05 pm  Time Out:  2:00 pm  Total Billable Time: 55 minutes (1US, 1MT, 2TE)    Precautions:  Standard      Subjective     Pt reports: "The pain pretty much stays the same. I'm concerned about how stiff my fingers are all the time. They are better, but they are still pretty stiff "  she was compliant with home exercise program given last session.   Response to previous treatment: decreased pain and increased AROM   Functional change: able to hold small light objects, able to make a loose composite fist    Pain:  5-6/10 pre-tx; 4-5/10 post-tx  Location: left elbow/wrist/hand      Objective     Sincere received the following supervised modalities after being cleared for contradictions for 10 minutes:   -Paraffin w/ MHP to L wrist/hand, pre-tx to decrease pain & increase tissue extensibility    Sincere received the following manual therapy techniques for 5 minutes:   -Edema mgmt w/ lymphatic drainage technique;  Deep STM, including MFR, CFM, TPR & scar mgmt to L elbow/forearm/wrist/hand, using hand techniques and IASTM tools to increase blood flow/circulation, improve soft tissue pliability and decrease pain.    Sincere received therapeutic exercises for 10 minutes including:    Objective measures taken today, see above for details  Dexterciser  3 min   Isospheres 3 min   9 Peg  3 trial, remove/replace    T-Putty Yellow/green   -Molding 3 min "   -Graspmanipulation 3 reps   -Log rolls w/ tripod pinch 2 logs     Orthotic Fitting & Training:  A custom forearm-based static progressive RF & SF composite finger flexion orthosis was fabricated today to address persistent joint stiffness. Good fit & positioning achieved upon completion of splint fabrication.   Patient was provided instruct on purpose, wear schedule, care, precautions to monitor and proper donning & doffing of orthosis.  Patient verbalized understanding of all instruct provided today. Total time in Orthotic fabrication and trainin minutes.     Home Exercises and Education Provided     Education provided:   - Reviewed HEP;     Written Home Exercises Provided: Patient instructed to cont prior HEP.  Exercises were reviewed and Sincere was able to demonstrate them prior to the end of the session.  Sincere demonstrated good  understanding of the HEP provided.     See EMR under Patient Instructions for exercises provided prior visit.        Assessment     Patient noted to have less stiffness in L RF & SF after 10 minutes of trial wear of static progressive orthosis today.  Patient would benefit from wearing this custom orthosis to provide a low-load, long duration stretch to L RF & SF.  She is making progress, however, pain and joint stiffness continue to persist and affect her functioning and progress.  Her prognosis continues to be Good.     Pt will continue to benefit from skilled outpatient occupational therapy to address the deficits listed in the problem list on initial evaluation provide pt/family education and to maximize pt's level of independence in the home and community environment.     Anticipated barriers to occupational therapy: pain    Pt's spiritual, cultural and educational needs considered and pt agreeable to plan of care and goals.    Goals:  Short Term Goals: (in 2 weeks-07/10/19))  1) Patient will be independent in HEP-met  2) Decrease pain in L elbow/wrist/hand to no more than  4-5/10 worst in ADL/IADL's-part met  3) Increase AROM in L wrist Extension by 10 degrees for improved functioning in ADL/IADL's-part met  4) Assess  & pinch strength-met  5) Decrease edema in L wrist by .2-.3 cm-met   6) Increase L IF-SF COSTA's by 10-15 degrees for increased functional use of hand in ADL/IADL's-met     Long Term Goals: (in 6 weeks-08/07/19)  1) Decrease pain in L elbow/wrist/hand to no more than 1-2/10 worst in ADL/IADL's   2) Increase AROM of L elbow/wrist/hand to WFL for increased functioning in ADL/IADL's  3) Increase strength in L  & pinch by 20% of initial measures for improved functioning in ADL/IADL's  4) Increased functioning in ADL/IADL's, as evidenced by a FOTO impairment rating of no more than 35%  5) Decrease edema in L elbow/wrist/hand to trace or none    Plan     Updated Certification Period: 8/8/2019 to 09/20/19    Recommended Treatment Plan: 2 times per week for 6 weeks: Electrical Stimulation IFC, Manual Therapy, Moist Heat/ Ice, Neuromuscular Re-ed, Orthotic Management and Training, Paraffin, Patient Education, Self Care, Therapeutic Activites, Therapeutic Exercise and Ultrasound  Other Recommendations: Patient may benefit from a custom L RF/SF orthosis to address developing Boutonierre deformity      Joseph Carlin, OT

## 2019-08-13 ENCOUNTER — CLINICAL SUPPORT (OUTPATIENT)
Dept: REHABILITATION | Facility: HOSPITAL | Age: 59
End: 2019-08-13
Payer: COMMERCIAL

## 2019-08-13 DIAGNOSIS — M62.81 MUSCLE WEAKNESS: ICD-10-CM

## 2019-08-13 DIAGNOSIS — R20.9 SENSORY DISTURBANCE: ICD-10-CM

## 2019-08-13 DIAGNOSIS — M25.632 STIFFNESS OF LEFT WRIST JOINT: ICD-10-CM

## 2019-08-13 DIAGNOSIS — M79.602 LEFT ARM PAIN: ICD-10-CM

## 2019-08-13 PROCEDURE — 97140 MANUAL THERAPY 1/> REGIONS: CPT | Mod: PO

## 2019-08-13 PROCEDURE — 97018 PARAFFIN BATH THERAPY: CPT | Mod: PO

## 2019-08-13 PROCEDURE — 97110 THERAPEUTIC EXERCISES: CPT | Mod: PO

## 2019-08-13 NOTE — PROGRESS NOTES
"  Occupational Therapy Daily Treatment Note     Date: 8/13/2019  Name: Sincere Riggins  Clinic Number: [id number]    Therapy Diagnosis:   Encounter Diagnoses   Name Primary?    Left arm pain     Stiffness of left wrist joint     Muscle weakness     Sensory disturbance      Physician: Vadim Harp Jr*    Physician Orders: Eval & Treat, post op care  Medical Diagnosis: L CTR & CuTR  Surgical Procedure and Date:  L CTR & CuTR, 06/07/19  Evaluation Date: 06/27/19  Insurance Authorization Period Expiration: 12/31/19      Visit # / Visits authorized: 12 / 30  Time In: 1:05 pm  Time Out:  2:00 pm  Total Billable Time: 55 minutes (1US, 1MT, 2TE)    Precautions:  Standard      Subjective     Pt reports: "I'm starting to feel more throughout the length of my arm. If I tap the nerve at my elbow, I feel it at my finger"  she was compliant with home exercise program given last session.   Response to previous treatment: decreased pain and increased AROM   Functional change: able to hold small light objects, able to make a loose composite fist    Pain:  4/10 pre-tx; /10 post-tx  Location: left elbow/wrist/hand      Objective     Sincere received the following supervised modalities after being cleared for contradictions for 10 minutes:   -Paraffin w/ MHP to L wrist/hand, pre-tx to decrease pain & increase tissue extensibility    Sincere received the following manual therapy techniques for 20 minutes:   -Edema mgmt w/ lymphatic drainage technique;  Deep STM, including MFR, CFM, TPR & scar mgmt to L elbow/forearm/wrist/hand, using hand techniques and IASTM tools to increase blood flow/circulation, improve soft tissue pliability and decrease pain.  -Kinesiotaping: spiral wrap with EDF pattern for IF-SF applied to L hand/fingers for edema mgmt. Patient instructed on purpose, wear, care, precautions to monitor and removal of KT. Patient verbalized understanding of all instructions provided.      Sincere received therapeutic exercises " for 30 minutes including:      Dexterciser  3 min   Isospheres 3 min   9 Peg  3 trial, remove/replace    T-Putty Yellow/green   -Molding 3 min   -Graspmanipulation 3 reps   -Log rolls w/ tripod pinch 2 logs       Home Exercises and Education Provided     Education provided:   - Reviewed HEP;     Written Home Exercises Provided: Patient instructed to cont prior HEP.  Exercises were reviewed and Sincere was able to demonstrate them prior to the end of the session.  Sincere demonstrated good  understanding of the HEP provided.     See EMR under Patient Instructions for exercises provided prior visit.        Assessment     Patient is making steady gains and improvement in condition of CuTS and CTS of LUE, as she is regaining sensation along the length of her ulnar nerve.  She is also noted to have improvement in L RF & SF claw posturing.  She is tolerating treatment well.  Her prognosis continues to be Good.     Pt will continue to benefit from skilled outpatient occupational therapy to address the deficits listed in the problem list on initial evaluation provide pt/family education and to maximize pt's level of independence in the home and community environment.     Anticipated barriers to occupational therapy: pain    Pt's spiritual, cultural and educational needs considered and pt agreeable to plan of care and goals.    Goals:  Short Term Goals: (in 2 weeks-07/10/19))  1) Patient will be independent in HEP-met  2) Decrease pain in L elbow/wrist/hand to no more than 4-5/10 worst in ADL/IADL's-part met  3) Increase AROM in L wrist Extension by 10 degrees for improved functioning in ADL/IADL's-part met  4) Assess  & pinch strength-met  5) Decrease edema in L wrist by .2-.3 cm-met   6) Increase L IF-SF COSTA's by 10-15 degrees for increased functional use of hand in ADL/IADL's-met     Long Term Goals: (in 6 weeks-08/07/19)  1) Decrease pain in L elbow/wrist/hand to no more than 1-2/10 worst in ADL/IADL's   2) Increase AROM  of L elbow/wrist/hand to WFL for increased functioning in ADL/IADL's  3) Increase strength in L  & pinch by 20% of initial measures for improved functioning in ADL/IADL's  4) Increased functioning in ADL/IADL's, as evidenced by a FOTO impairment rating of no more than 35%  5) Decrease edema in L elbow/wrist/hand to trace or none    Plan     Updated Certification Period: 8/8/2019 to 09/20/19    Recommended Treatment Plan: 2 times per week for 6 weeks: Electrical Stimulation IFC, Manual Therapy, Moist Heat/ Ice, Neuromuscular Re-ed, Orthotic Management and Training, Paraffin, Patient Education, Self Care, Therapeutic Activites, Therapeutic Exercise and Ultrasound  Other Recommendations: Patient may benefit from a custom L RF/SF orthosis to address developing Boutonierre deformity      Joseph Carlin, OT

## 2019-08-15 ENCOUNTER — CLINICAL SUPPORT (OUTPATIENT)
Dept: REHABILITATION | Facility: HOSPITAL | Age: 59
End: 2019-08-15
Payer: COMMERCIAL

## 2019-08-15 DIAGNOSIS — M25.632 STIFFNESS OF LEFT WRIST JOINT: ICD-10-CM

## 2019-08-15 DIAGNOSIS — R20.9 SENSORY DISTURBANCE: ICD-10-CM

## 2019-08-15 DIAGNOSIS — M62.81 MUSCLE WEAKNESS: ICD-10-CM

## 2019-08-15 DIAGNOSIS — M79.602 LEFT ARM PAIN: ICD-10-CM

## 2019-08-15 PROCEDURE — 97018 PARAFFIN BATH THERAPY: CPT | Mod: PO

## 2019-08-15 PROCEDURE — 97110 THERAPEUTIC EXERCISES: CPT | Mod: PO

## 2019-08-15 PROCEDURE — 97140 MANUAL THERAPY 1/> REGIONS: CPT | Mod: PO

## 2019-08-15 NOTE — PROGRESS NOTES
"  Occupational Therapy Daily Treatment Note     Date: 8/15/2019  Name: Sincere Riggins  Clinic Number: [id number]    Therapy Diagnosis:   Encounter Diagnoses   Name Primary?    Left arm pain     Stiffness of left wrist joint     Muscle weakness     Sensory disturbance      Physician: Vadim Harp Jr*    Physician Orders: Eval & Treat, post op care  Medical Diagnosis: L CTR & CuTR  Surgical Procedure and Date:  L CTR & CuTR, 06/07/19  Evaluation Date: 06/27/19  Insurance Authorization Period Expiration: 12/31/19      Visit # / Visits authorized: 13 / 30  Time In: 1:00 pm  Time Out:  2:00 pm  Total Billable Time: 60 minutes (P, 1MT, 2TE)    Precautions:  Standard      Subjective     Pt reports: "I can tell it's getting better.  On the splint you made for me, I can pull the sling down more for the RF than the SF"  she was compliant with home exercise program given last session.   Response to previous treatment: decreased pain and increased AROM   Functional change: able to hold small light objects, able to make a loose composite fist    Pain:  3-4/10 pre-tx; /10 post-tx  Location: left elbow/wrist/hand      Objective     Sincere received the following supervised modalities after being cleared for contradictions for 10 minutes:   -Paraffin w/ MHP to L wrist/hand, pre-tx to decrease pain & increase tissue extensibility    Sincere received the following manual therapy techniques for 20 minutes:   -Edema mgmt w/ lymphatic drainage technique;  Deep STM, including MFR, CFM, TPR & scar mgmt to L elbow/forearm/wrist/hand, using hand techniques and IASTM tools to increase blood flow/circulation, improve soft tissue pliability and decrease pain.  -Kinesiotaping: spiral wrap with EDF pattern for IF-SF applied to L hand/fingers for edema mgmt. Patient instructed on purpose, wear, care, precautions to monitor and removal of KT. Patient verbalized understanding of all instructions provided.      Sincere received therapeutic " exercises for 30 minutes including:    Dexterciser  3 min   Isospheres 3 min   9 Peg  3 trial, remove/replace    T-Putty Yellow/green   -Molding 3 min   -Graspmanipulation 3 reps   -Log rolls w/ tripod pinch 2 logs       Home Exercises and Education Provided     Education provided:   - Reviewed HEP;     Written Home Exercises Provided: Patient instructed to cont prior HEP.  Exercises were reviewed and Sincere was able to demonstrate them prior to the end of the session.  Sincere demonstrated good  understanding of the HEP provided.     See EMR under Patient Instructions for exercises provided prior visit.        Assessment     Patient continues to tolerate treatment well and is having less pain compared to last few treatments.  Her FMC is slowly improving as she regains her strength.  Her prognosis continues to be Good.     Pt will continue to benefit from skilled outpatient occupational therapy to address the deficits listed in the problem list on initial evaluation provide pt/family education and to maximize pt's level of independence in the home and community environment.     Anticipated barriers to occupational therapy: pain    Pt's spiritual, cultural and educational needs considered and pt agreeable to plan of care and goals.    Goals:  Short Term Goals: (in 2 weeks-07/10/19))  1) Patient will be independent in HEP-met  2) Decrease pain in L elbow/wrist/hand to no more than 4-5/10 worst in ADL/IADL's-part met  3) Increase AROM in L wrist Extension by 10 degrees for improved functioning in ADL/IADL's-part met  4) Assess  & pinch strength-met  5) Decrease edema in L wrist by .2-.3 cm-met   6) Increase L IF-SF COSTA's by 10-15 degrees for increased functional use of hand in ADL/IADL's-met     Long Term Goals: (in 6 weeks-08/07/19)  1) Decrease pain in L elbow/wrist/hand to no more than 1-2/10 worst in ADL/IADL's   2) Increase AROM of L elbow/wrist/hand to WFL for increased functioning in ADL/IADL's  3) Increase  strength in L  & pinch by 20% of initial measures for improved functioning in ADL/IADL's  4) Increased functioning in ADL/IADL's, as evidenced by a FOTO impairment rating of no more than 35%  5) Decrease edema in L elbow/wrist/hand to trace or none    Plan     Updated Certification Period: 8/8/2019 to 09/20/19    Recommended Treatment Plan: 2 times per week for 6 weeks: Electrical Stimulation IFC, Manual Therapy, Moist Heat/ Ice, Neuromuscular Re-ed, Orthotic Management and Training, Paraffin, Patient Education, Self Care, Therapeutic Activites, Therapeutic Exercise and Ultrasound  Other Recommendations: Patient may benefit from a custom L RF/SF orthosis to address developing Boutonierre deformity      Joseph Carlin, OT

## 2019-08-20 ENCOUNTER — CLINICAL SUPPORT (OUTPATIENT)
Dept: REHABILITATION | Facility: HOSPITAL | Age: 59
End: 2019-08-20
Payer: COMMERCIAL

## 2019-08-20 DIAGNOSIS — M25.632 STIFFNESS OF LEFT WRIST JOINT: ICD-10-CM

## 2019-08-20 DIAGNOSIS — M79.602 LEFT ARM PAIN: ICD-10-CM

## 2019-08-20 DIAGNOSIS — R20.9 SENSORY DISTURBANCE: ICD-10-CM

## 2019-08-20 DIAGNOSIS — M62.81 MUSCLE WEAKNESS: ICD-10-CM

## 2019-08-20 PROCEDURE — 97110 THERAPEUTIC EXERCISES: CPT | Mod: PO

## 2019-08-20 PROCEDURE — 97035 APP MDLTY 1+ULTRASOUND EA 15: CPT | Mod: PO

## 2019-08-20 PROCEDURE — 97140 MANUAL THERAPY 1/> REGIONS: CPT | Mod: PO

## 2019-08-20 NOTE — PROGRESS NOTES
"  Occupational Therapy Daily Treatment Note     Date: 8/20/2019  Name: Sincere Riggins  Clinic Number: [id number]    Therapy Diagnosis:   Encounter Diagnoses   Name Primary?    Left arm pain     Stiffness of left wrist joint     Muscle weakness     Sensory disturbance      Physician: Vadim Harp Jr*    Physician Orders: Eval & Treat, post op care  Medical Diagnosis: L CTR & CuTR  Surgical Procedure and Date:  L CTR & CuTR, 06/07/19  Evaluation Date: 06/27/19  Insurance Authorization Period Expiration: 12/31/19      Visit # / Visits authorized: 14 / 30  Time In: 1:00 pm  Time Out:  2:05 pm  Total Billable Time: 65 minutes (1US, 2MT, 2TE)    Precautions:  Standard      Subjective     Pt reports: "The pain is starting to diminish. It depends on the time of the day". Patient reports afternoon is worst time for pain  she was compliant with home exercise program given last session.   Response to previous treatment: decreased pain and increased AROM   Functional change: able to hold small light objects, able to make a loose composite fist    Pain:  2-3/10 pre-tx;   Location: left elbow/wrist/hand      Objective     Sincere received the following supervised modalities after being cleared for contradictions for 10 minutes:   -Paraffin w/ MHP to L wrist/hand, pre-tx to decrease pain & increase tissue extensibility    Sincere received the following manual therapy techniques for 25 minutes:   -Edema mgmt w/ lymphatic drainage technique;  Deep STM, including MFR, CFM, TPR & scar mgmt to L elbow/forearm/wrist/hand, using hand techniques and IASTM tools to increase blood flow/circulation, improve soft tissue pliability and decrease pain.  -Joint mobilizations:  L RF/SF PIP joint mobs, all planes of mobility, including distractions, grade 2    Sincere received therapeutic exercises for 30 minutes including:    Dexterciser  3 min   Isospheres 3 min   9 Peg  3 trial, remove/replace        T-Putty Yellow/green   -Molding 3 min "   -Graspmanipulation 3 reps   -Log rolls w/ tripod pinch 2 logs   -cones 3 reps           Home Exercises and Education Provided     Education provided:   - Recommendations made for LMB Spring Extension Splint for personal purchase to address developing Boutonierre deformity; patient verbalized understanding and is agreeable to purchase these splints      Written Home Exercises Provided: Patient instructed to cont prior HEP.  Exercises were reviewed and Sincere was able to demonstrate them prior to the end of the session.  Sincere demonstrated good  understanding of the HEP provided.     See EMR under Patient Instructions for exercises provided prior visit.        Assessment     Patient continues to have clawing posturing of L RF & SF, but improvement noted in developing L RF & SF PIP flexion contracture by end of treatment today.  Pain is decreasing and patient is able to use her L hand more functionally in light ADL'IADL's.   Her prognosis continues to be Good.     Pt will continue to benefit from skilled outpatient occupational therapy to address the deficits listed in the problem list on initial evaluation provide pt/family education and to maximize pt's level of independence in the home and community environment.     Anticipated barriers to occupational therapy: pain    Pt's spiritual, cultural and educational needs considered and pt agreeable to plan of care and goals.    Goals:  Short Term Goals: (in 2 weeks-07/10/19))  1) Patient will be independent in HEP-met  2) Decrease pain in L elbow/wrist/hand to no more than 4-5/10 worst in ADL/IADL's-part met  3) Increase AROM in L wrist Extension by 10 degrees for improved functioning in ADL/IADL's-part met  4) Assess  & pinch strength-met  5) Decrease edema in L wrist by .2-.3 cm-met   6) Increase L IF-SF COSTA's by 10-15 degrees for increased functional use of hand in ADL/IADL's-met     Long Term Goals: (in 6 weeks-08/07/19)  1) Decrease pain in L elbow/wrist/hand to  no more than 1-2/10 worst in ADL/IADL's   2) Increase AROM of L elbow/wrist/hand to WFL for increased functioning in ADL/IADL's  3) Increase strength in L  & pinch by 20% of initial measures for improved functioning in ADL/IADL's  4) Increased functioning in ADL/IADL's, as evidenced by a FOTO impairment rating of no more than 35%  5) Decrease edema in L elbow/wrist/hand to trace or none    Plan     Updated Certification Period: 8/8/2019 to 09/20/19    Recommended Treatment Plan: 2 times per week for 6 weeks: Electrical Stimulation IFC, Manual Therapy, Moist Heat/ Ice, Neuromuscular Re-ed, Orthotic Management and Training, Paraffin, Patient Education, Self Care, Therapeutic Activites, Therapeutic Exercise and Ultrasound  Other Recommendations: Patient may benefit from a custom L RF/SF orthosis to address developing Boutonierre deformity      Joseph Carlin, OT

## 2019-08-22 ENCOUNTER — CLINICAL SUPPORT (OUTPATIENT)
Dept: REHABILITATION | Facility: HOSPITAL | Age: 59
End: 2019-08-22
Payer: COMMERCIAL

## 2019-08-22 DIAGNOSIS — M62.81 MUSCLE WEAKNESS: ICD-10-CM

## 2019-08-22 DIAGNOSIS — M25.632 STIFFNESS OF LEFT WRIST JOINT: ICD-10-CM

## 2019-08-22 DIAGNOSIS — R20.9 SENSORY DISTURBANCE: ICD-10-CM

## 2019-08-22 DIAGNOSIS — M79.602 LEFT ARM PAIN: ICD-10-CM

## 2019-08-22 PROCEDURE — 97035 APP MDLTY 1+ULTRASOUND EA 15: CPT | Mod: PO

## 2019-08-22 PROCEDURE — 97110 THERAPEUTIC EXERCISES: CPT | Mod: PO

## 2019-08-22 PROCEDURE — 97140 MANUAL THERAPY 1/> REGIONS: CPT | Mod: PO

## 2019-08-22 NOTE — PROGRESS NOTES
"  Occupational Therapy Daily Treatment Note     Date: 8/22/2019  Name: Sincere Riggins  Clinic Number: [id number]    Therapy Diagnosis:   Encounter Diagnoses   Name Primary?    Left arm pain     Stiffness of left wrist joint     Muscle weakness     Sensory disturbance      Physician: Vadim Harp Jr*    Physician Orders: Eval & Treat, post op care  Medical Diagnosis: L CTR & CuTR  Surgical Procedure and Date:  L CTR & CuTR, 06/07/19  Evaluation Date: 06/27/19  Insurance Authorization Period Expiration: 12/31/19      Visit # / Visits authorized: 15 / 30  Time In: 1:00 pm  Time Out:  2:05 pm  Total Billable Time: 65 minutes (1US, 1MT, 2TE)    Precautions:  Standard      Subjective     Pt reports: "The pain is starting getting less and less. I'm mostly focusing on my strength now"  she was compliant with home exercise program given last session.   Response to previous treatment: decreased pain and increased AROM   Functional change: able to hold small light objects, able to make a loose composite fist    Pain:  2-3/10 pre-tx;   Location: left elbow/wrist/hand      Objective     Sincere received the following direct contact modalities after being cleared for contradictions for 10 minutes:   -3.0 MHz, 1.0W/cm2, continuous, Lwrist/hand to decrease pain & edema, increase circulation and tissue extensibility     Sincere received the following manual therapy techniques for 20 minutes:   -Edema mgmt w/ lymphatic drainage technique;  Deep STM, including MFR, CFM, TPR & scar mgmt to L elbow/forearm/wrist/hand, using hand techniques and IASTM tools to increase blood flow/circulation, improve soft tissue pliability and decrease pain.    Sincere received therapeutic exercises for 35 minutes including:    Dexterciser  3 min   Isospheres 3 min   9 Peg  3 trial, remove/replace    Octopus In-hand Manipulatiom 3 trials   Smiles & Frowns 10 reps each way, red T-bar   Wrist 3 ways over wedge 2/10 reps, 2# wt       T-Putty Yellow/green "   -Molding 3 min   -Graspmanipulation 3 reps   -Log rolls w/ tripod pinch 2 logs   -cones 3 reps           Home Exercises and Education Provided     Education provided:   - Recommendations made for LMB Spring Extension Splint for personal purchase to address developing Boutonierre deformity; patient verbalized understanding and is agreeable to purchase these splints      Written Home Exercises Provided: Patient instructed to cont prior HEP.  Exercises were reviewed and Sincere was able to demonstrate them prior to the end of the session.  Sincere demonstrated good  understanding of the HEP provided.     See EMR under Patient Instructions for exercises provided prior visit.        Assessment     Patient presents with less clawing posturing of L RF & SF while at rest.  She is gaining strength in her L elbow/forear/wrist/hand.  Pain continues to decrease and patient is able to use her L hand more functionally in light ADL'IADL's.   Her prognosis continues to be Good.     Pt will continue to benefit from skilled outpatient occupational therapy to address the deficits listed in the problem list on initial evaluation provide pt/family education and to maximize pt's level of independence in the home and community environment.     Anticipated barriers to occupational therapy: pain    Pt's spiritual, cultural and educational needs considered and pt agreeable to plan of care and goals.    Goals:  Short Term Goals: (in 2 weeks-07/10/19))  1) Patient will be independent in HEP-met  2) Decrease pain in L elbow/wrist/hand to no more than 4-5/10 worst in ADL/IADL's-part met  3) Increase AROM in L wrist Extension by 10 degrees for improved functioning in ADL/IADL's-part met  4) Assess  & pinch strength-met  5) Decrease edema in L wrist by .2-.3 cm-met   6) Increase L IF-SF COSTA's by 10-15 degrees for increased functional use of hand in ADL/IADL's-met     Long Term Goals: (in 6 weeks-08/07/19)  1) Decrease pain in L elbow/wrist/hand to  no more than 1-2/10 worst in ADL/IADL's   2) Increase AROM of L elbow/wrist/hand to WFL for increased functioning in ADL/IADL's  3) Increase strength in L  & pinch by 20% of initial measures for improved functioning in ADL/IADL's  4) Increased functioning in ADL/IADL's, as evidenced by a FOTO impairment rating of no more than 35%  5) Decrease edema in L elbow/wrist/hand to trace or none    Plan     Updated Certification Period: 8/8/2019 to 09/20/19    Recommended Treatment Plan: 2 times per week for 6 weeks: Electrical Stimulation IFC, Manual Therapy, Moist Heat/ Ice, Neuromuscular Re-ed, Orthotic Management and Training, Paraffin, Patient Education, Self Care, Therapeutic Activites, Therapeutic Exercise and Ultrasound  Other Recommendations: Patient may benefit from a custom L RF/SF orthosis to address developing Boutonierre deformity      Joseph Carlin, OT

## 2019-08-27 ENCOUNTER — CLINICAL SUPPORT (OUTPATIENT)
Dept: REHABILITATION | Facility: HOSPITAL | Age: 59
End: 2019-08-27
Payer: COMMERCIAL

## 2019-08-27 DIAGNOSIS — M25.632 STIFFNESS OF LEFT WRIST JOINT: ICD-10-CM

## 2019-08-27 DIAGNOSIS — M79.602 LEFT ARM PAIN: ICD-10-CM

## 2019-08-27 DIAGNOSIS — R20.9 SENSORY DISTURBANCE: ICD-10-CM

## 2019-08-27 DIAGNOSIS — M62.81 MUSCLE WEAKNESS: ICD-10-CM

## 2019-08-27 PROCEDURE — 97032 APPL MODALITY 1+ESTIM EA 15: CPT | Mod: PO

## 2019-08-27 PROCEDURE — 97110 THERAPEUTIC EXERCISES: CPT | Mod: PO

## 2019-08-27 PROCEDURE — 97018 PARAFFIN BATH THERAPY: CPT | Mod: PO

## 2019-08-27 PROCEDURE — 97763 ORTHC/PROSTC MGMT SBSQ ENC: CPT | Mod: PO

## 2019-08-27 NOTE — PROGRESS NOTES
"  Occupational Therapy Daily Treatment Note     Date: 8/27/2019  Name: Sincere Riggins  Clinic Number: [id number]    Therapy Diagnosis:   Encounter Diagnoses   Name Primary?    Left arm pain     Stiffness of left wrist joint     Muscle weakness     Sensory disturbance      Physician: Vadim Harp Jr*    Physician Orders: Eval & Treat, post op care  Medical Diagnosis: L CTR & CuTR  Surgical Procedure and Date:  L CTR & CuTR, 06/07/19  Evaluation Date: 06/27/19  Insurance Authorization Period Expiration: 12/31/19      Visit # / Visits authorized: 16 / 30  Time In: 1:05 pm  Time Out:  2:00 pm  Total Billable Time: 55 minutes (P, 1TE, 1E-stim (attended), 1Ortho)    Precautions:  Standard      Subjective     Pt reports: "I had a lot of pain over the weekend. Enough pain that I needed to take a pain pill. It mostly is in the afternoon, early evening"  she was compliant with home exercise program given last session.   Response to previous treatment: decreased pain and increased AROM   Functional change: able to hold small light objects, able to make a loose composite fist    Pain:  3-4/10 pre-tx;   Location: left elbow/wrist/hand      Objective     Sincere received the following supervised modalities after being cleared for contradictions for 20 minutes:   -Paraffin w/ MHP to L wrist/hand, 10 minutes, pre-tx to decrease pain & increase tissue extensibility  -E-stim: trial of Pre-mod & IFC for L forearm/wrist/hand pain (along ulnar distribution) 10 minutes total time; parameters as follows:  Hz, CV, continuous cycle, Pre-mod on hand intensity level 45 and IFC intensity level 30.      Sincere received therapeutic exercises for 15 minutes including:    Dexterciser  (not today) 3 min   Isospheres 3 min   9 Peg  3 trial, remove/replace    Octopus In-hand Manipulatiom 3 trials   Smiles & Frowns (not today) 10 reps each way, red T-bar   Wrist 3 ways over wedge (not today) 2/10 reps, 2# wt       T-Putty Yellow/green "   -Molding (not today) 3 min   -Graspmanipulation (not today) 3 reps   -Log rolls w/ tripod pinch (not today) 2 logs   -cones (not today) 3 reps         Orthotic Fitting & Training: Fitting and training provided today for L RF & SF LMB PIP Extension splints.  Good fit & positioning achieved upon completion of splint fabrication.   Patient was provided instruct on purpose, wear schedule, care, precautions to monitor, proper donning & doffing of orthosis and adjustment in tension for increased/decreased stretch.  Patient verbalized understanding of all instruct provided today. Total time spent in fitting/training 20 minutes    Home Exercises and Education Provided     Education provided:   - Discussed alternative methods of pain management, including possible use of prescription pain cream and TENS unit for personal home use. Patient verbalized understanding of all instruction provided today and plans to follow up with her referring MD regarding options discussed today.      Written Home Exercises Provided: Patient instructed to cont prior HEP.  Exercises were reviewed and Sincere was able to demonstrate them prior to the end of the session.  Sincere demonstrated good  understanding of the HEP provided.     See EMR under Patient Instructions for exercises provided prior visit.        Assessment     Patient continues to have have moderate to significant pain in the afternoon/evening that affects her functioning in ADL/IADL's and sleep cycle at night.  Options were discussed for alternative pain management and patient plans to follow up with her referring MD regarding this.  Otherwise, L RF & SF LMB PIP Extension splints will improve flexion contractures and prevent further decline.  Recommendations for wear schedule were made and patient is in agreement with recommendations.  Patient may possibly benefit from a TENS unit to help decrease pain. Her prognosis continues to be Good.     Pt will continue to benefit from skilled  outpatient occupational therapy to address the deficits listed in the problem list on initial evaluation provide pt/family education and to maximize pt's level of independence in the home and community environment.     Anticipated barriers to occupational therapy: pain    Pt's spiritual, cultural and educational needs considered and pt agreeable to plan of care and goals.    Goals:  Short Term Goals: (in 2 weeks-07/10/19))  1) Patient will be independent in HEP-met  2) Decrease pain in L elbow/wrist/hand to no more than 4-5/10 worst in ADL/IADL's-part met  3) Increase AROM in L wrist Extension by 10 degrees for improved functioning in ADL/IADL's-part met  4) Assess  & pinch strength-met  5) Decrease edema in L wrist by .2-.3 cm-met   6) Increase L IF-SF COSTA's by 10-15 degrees for increased functional use of hand in ADL/IADL's-met     Long Term Goals: (in 6 weeks-08/07/19)  1) Decrease pain in L elbow/wrist/hand to no more than 1-2/10 worst in ADL/IADL's   2) Increase AROM of L elbow/wrist/hand to WFL for increased functioning in ADL/IADL's  3) Increase strength in L  & pinch by 20% of initial measures for improved functioning in ADL/IADL's  4) Increased functioning in ADL/IADL's, as evidenced by a FOTO impairment rating of no more than 35%  5) Decrease edema in L elbow/wrist/hand to trace or none    Plan     Updated Certification Period: 8/8/2019 to 09/20/19    Recommended Treatment Plan: 2 times per week for 6 weeks: Electrical Stimulation IFC, Manual Therapy, Moist Heat/ Ice, Neuromuscular Re-ed, Orthotic Management and Training, Paraffin, Patient Education, Self Care, Therapeutic Activites, Therapeutic Exercise and Ultrasound  Other Recommendations: Patient may benefit from a custom L RF/SF orthosis to address developing Boutonierre deformity      Joseph Carlin, OT

## 2019-08-29 ENCOUNTER — CLINICAL SUPPORT (OUTPATIENT)
Dept: REHABILITATION | Facility: HOSPITAL | Age: 59
End: 2019-08-29
Payer: COMMERCIAL

## 2019-08-29 DIAGNOSIS — M25.632 STIFFNESS OF LEFT WRIST JOINT: ICD-10-CM

## 2019-08-29 DIAGNOSIS — M79.602 LEFT ARM PAIN: ICD-10-CM

## 2019-08-29 DIAGNOSIS — M62.81 MUSCLE WEAKNESS: ICD-10-CM

## 2019-08-29 DIAGNOSIS — R20.9 SENSORY DISTURBANCE: ICD-10-CM

## 2019-08-29 PROCEDURE — 97140 MANUAL THERAPY 1/> REGIONS: CPT | Mod: PO

## 2019-08-29 PROCEDURE — 97110 THERAPEUTIC EXERCISES: CPT | Mod: PO

## 2019-08-29 PROCEDURE — 97018 PARAFFIN BATH THERAPY: CPT | Mod: PO

## 2019-08-29 NOTE — PROGRESS NOTES
"  Occupational Therapy Daily Treatment Note     Date: 8/29/2019  Name: Sincere Riggins  Clinic Number: [id number]    Therapy Diagnosis:   Encounter Diagnoses   Name Primary?    Left arm pain     Stiffness of left wrist joint     Muscle weakness     Sensory disturbance      Physician: Vadim Harp Jr*    Physician Orders: Eval & Treat, post op care  Medical Diagnosis: L CTR & CuTR  Surgical Procedure and Date:  L CTR & CuTR, 06/07/19  Evaluation Date: 06/27/19  Insurance Authorization Period Expiration: 12/31/19      Visit # / Visits authorized: 16 / 30  Time In: 1:15 pm  Time Out:  2:15 pm  Total Billable Time: 60 minutes (P, 1MT, 2TE)    Precautions:  Standard      Subjective     Pt reports: "The pain is not too bad today"  she was compliant with home exercise program given last session.   Response to previous treatment: decreased pain and increased AROM   Functional change: able to hold small light objects, able to make a loose composite fist    Pain:  2-3/10 pre-tx;   Location: left elbow/wrist/hand      Objective     Sincere received the following supervised modalities after being cleared for contradictions for10 minutes:   -Paraffin w/ MHP to L wrist/hand, 10 minutes, pre-tx to decrease pain & increase tissue extensibility    Sincere received manual therapy for 15 minutes, as follows:  -Edema mgmt w/ lymphatic drainage technique;  Deep STM, including MFR, CFM, TPR & scar mgmt to L elbow/forearm/wrist/hand, using hand techniques and IASTM tools to increase blood flow/circulation, improve soft tissue pliability and decrease pain.       Sincere received therapeutic exercises for 28 minutes including:    Dexterciser  (not today) 3 min   Isospheres 3 min   9 Peg  (not today) 3 trial, remove/replace    Octopus In-hand Manipulatiom (not today) 3 trials   Elbow-3 ways 10 reps, each way, 3# wt   Smiles & Frowns 2/10 reps each way, red T-bar   Wrist 3 ways over wedge (not today) 2/10 reps, 2# wt       T-Putty " Yellow/green   -Molding 3 min   -Graspmanipulation 3 reps   -RF/SF Raking 3 trials, w/ yellow putty & therapist assist   -Thumb adduction pinches 10 reps, yellow, w/ assist for IP extension       -Log rolls w/ tripod pinch (not today) 2 logs   -cones (not today) 3 reps         Orthotic Fitting & Training for 7 minutes, as follows:    A L thumb IP extension exercise splint was fabricated today for patient to utilize during isolated thumb adduction and eliminate compensatory IP flexion.  Patient verbalized understanding of all instruct provided today. Total time spent in fitting/training 20 minutes    Home Exercises and Education Provided     Education provided:   - Patient was provided yellow T-putty to perform newly added intrinsic exercises in HEP.       Written Home Exercises Provided: Patient instructed to cont prior HEP.  Exercises were reviewed and Sincere was able to demonstrate them prior to the end of the session.  Sincere demonstrated good  understanding of the HEP provided.     See EMR under Patient Instructions for exercises provided prior visit.        Assessment     Patient noted to have less pain today.  She continues to have a significant amount of intrinsic muscle atrophy in her L hand, however, she was bale to perform new TE's today without any increase in pain.  Recommend to continue focus on strengthening and coordination.  Patient's prognosis continues to be Good.     Pt will continue to benefit from skilled outpatient occupational therapy to address the deficits listed in the problem list on initial evaluation provide pt/family education and to maximize pt's level of independence in the home and community environment.     Anticipated barriers to occupational therapy: pain    Pt's spiritual, cultural and educational needs considered and pt agreeable to plan of care and goals.    Goals:  Short Term Goals: (in 2 weeks-07/10/19))  1) Patient will be independent in HEP-met  2) Decrease pain in L  elbow/wrist/hand to no more than 4-5/10 worst in ADL/IADL's-part met  3) Increase AROM in L wrist Extension by 10 degrees for improved functioning in ADL/IADL's-part met  4) Assess  & pinch strength-met  5) Decrease edema in L wrist by .2-.3 cm-met   6) Increase L IF-SF COSTA's by 10-15 degrees for increased functional use of hand in ADL/IADL's-met     Long Term Goals: (in 6 weeks-08/07/19)  1) Decrease pain in L elbow/wrist/hand to no more than 1-2/10 worst in ADL/IADL's   2) Increase AROM of L elbow/wrist/hand to WFL for increased functioning in ADL/IADL's  3) Increase strength in L  & pinch by 20% of initial measures for improved functioning in ADL/IADL's  4) Increased functioning in ADL/IADL's, as evidenced by a FOTO impairment rating of no more than 35%  5) Decrease edema in L elbow/wrist/hand to trace or none    Plan     Updated Certification Period: 8/8/2019 to 09/20/19    Recommended Treatment Plan: 2 times per week for 6 weeks: Electrical Stimulation IFC, Manual Therapy, Moist Heat/ Ice, Neuromuscular Re-ed, Orthotic Management and Training, Paraffin, Patient Education, Self Care, Therapeutic Activites, Therapeutic Exercise and Ultrasound  Other Recommendations: Patient may benefit from a custom L RF/SF orthosis to address developing Boutonierre deformity      Joseph Carlin, OT

## 2019-08-30 ENCOUNTER — PATIENT MESSAGE (OUTPATIENT)
Dept: ORTHOPEDICS | Facility: CLINIC | Age: 59
End: 2019-08-30

## 2019-09-05 ENCOUNTER — CLINICAL SUPPORT (OUTPATIENT)
Dept: REHABILITATION | Facility: HOSPITAL | Age: 59
End: 2019-09-05
Payer: COMMERCIAL

## 2019-09-05 DIAGNOSIS — M79.602 LEFT ARM PAIN: ICD-10-CM

## 2019-09-05 DIAGNOSIS — M25.632 STIFFNESS OF LEFT WRIST JOINT: ICD-10-CM

## 2019-09-05 DIAGNOSIS — R20.9 SENSORY DISTURBANCE: ICD-10-CM

## 2019-09-05 DIAGNOSIS — M62.81 MUSCLE WEAKNESS: ICD-10-CM

## 2019-09-05 PROCEDURE — 97763 ORTHC/PROSTC MGMT SBSQ ENC: CPT | Mod: PO

## 2019-09-05 PROCEDURE — 97018 PARAFFIN BATH THERAPY: CPT | Mod: PO

## 2019-09-05 PROCEDURE — 97140 MANUAL THERAPY 1/> REGIONS: CPT | Mod: PO,59

## 2019-09-05 NOTE — PROGRESS NOTES
"  Occupational Therapy Daily Treatment Note     Date: 9/5/2019  Name: Sincere Riggins  Clinic Number: [id number]    Therapy Diagnosis:   Encounter Diagnoses   Name Primary?    Left arm pain     Stiffness of left wrist joint     Muscle weakness     Sensory disturbance      Physician: Vadim Harp Jr*    Physician Orders: Eval & Treat, post op care  Medical Diagnosis: L CTR & CuTR  Surgical Procedure and Date:  L CTR & CuTR, 06/07/19  Evaluation Date: 06/27/19  Insurance Authorization Period Expiration: 12/31/19      Visit # / Visits authorized: 17 / 30  Time In: 1:00 pm  Time Out:  2:00 pm  Total Billable Time: 60 minutes (P, 1MT, 2Ortho)    Precautions:  Standard      Subjective     Pt reports: "The pain is still bad in the afternoon and evening. It's still burning"  she was compliant with home exercise program given last session.   Response to previous treatment: decreased pain and increased AROM   Functional change: able to hold small light objects, able to make a loose composite fist    Pain:  2-3/10 pre-tx;   Location: left elbow/wrist/hand      Objective     Sincere received the following supervised modalities after being cleared for contradictions for10 minutes:   -Paraffin w/ MHP to L wrist/hand, 10 minutes, pre-tx to decrease pain & increase tissue extensibility    Sincere received manual therapy for 15 minutes, as follows:  -Edema mgmt w/ lymphatic drainage technique;  Deep STM, including MFR, CFM, TPR & scar mgmt to L elbow/forearm/wrist/hand, using hand techniques and IASTM tools to increase blood flow/circulation, improve soft tissue pliability and decrease pain.     Orthotic Fitting & Training for 35 minutes, as follows:    A hand based L RF & SF gutter Extension orthosis was fabricated today for patient to wear throughout he night to address developing clawing contractures.  Good fit and position was achieved upon completion of fabrication.  Patient verbalized understanding of all instruct provided " today. Total time spent in fitting/training 20 minutes    Home Exercises and Education Provided     Education provided:   - New custom RF/SF extension orthosis program.       Written Home Exercises Provided: Patient instructed to cont prior HEP.  Exercises were reviewed and Sincere was able to demonstrate them prior to the end of the session.  Sincere demonstrated good  understanding of the HEP provided.     See EMR under Patient Instructions for exercises provided prior visit.        Assessment     Patient presents with increased claw posturing of L RF & SF, however, this did improve after fabrication and 10 min wear of orthosis.  She is tolerating treatment well otherwise, and is making progress towards increased L hand functional use.  Recommend to continue focus on strengthening and coordination.  Patient's prognosis continues to be Good.     Pt will continue to benefit from skilled outpatient occupational therapy to address the deficits listed in the problem list on initial evaluation provide pt/family education and to maximize pt's level of independence in the home and community environment.     Anticipated barriers to occupational therapy: pain    Pt's spiritual, cultural and educational needs considered and pt agreeable to plan of care and goals.    Goals:  Short Term Goals: (in 2 weeks-07/10/19))  1) Patient will be independent in HEP-met  2) Decrease pain in L elbow/wrist/hand to no more than 4-5/10 worst in ADL/IADL's-part met  3) Increase AROM in L wrist Extension by 10 degrees for improved functioning in ADL/IADL's-part met  4) Assess  & pinch strength-met  5) Decrease edema in L wrist by .2-.3 cm-met   6) Increase L IF-SF COSTA's by 10-15 degrees for increased functional use of hand in ADL/IADL's-met     Long Term Goals: (in 6 weeks-08/07/19)  1) Decrease pain in L elbow/wrist/hand to no more than 1-2/10 worst in ADL/IADL's   2) Increase AROM of L elbow/wrist/hand to WFL for increased functioning in  ADL/IADL's  3) Increase strength in L  & pinch by 20% of initial measures for improved functioning in ADL/IADL's  4) Increased functioning in ADL/IADL's, as evidenced by a FOTO impairment rating of no more than 35%  5) Decrease edema in L elbow/wrist/hand to trace or none    Plan     Updated Certification Period: 8/8/2019 to 09/20/19    Recommended Treatment Plan: 2 times per week for 6 weeks: Electrical Stimulation IFC, Manual Therapy, Moist Heat/ Ice, Neuromuscular Re-ed, Orthotic Management and Training, Paraffin, Patient Education, Self Care, Therapeutic Activites, Therapeutic Exercise and Ultrasound  Other Recommendations: Patient may benefit from a custom L RF/SF orthosis to address developing Boutonierre deformity      Joseph Carlin, OT

## 2019-09-10 ENCOUNTER — CLINICAL SUPPORT (OUTPATIENT)
Dept: REHABILITATION | Facility: HOSPITAL | Age: 59
End: 2019-09-10
Payer: COMMERCIAL

## 2019-09-10 ENCOUNTER — PATIENT MESSAGE (OUTPATIENT)
Dept: ORTHOPEDICS | Facility: CLINIC | Age: 59
End: 2019-09-10

## 2019-09-10 DIAGNOSIS — R20.9 SENSORY DISTURBANCE: ICD-10-CM

## 2019-09-10 DIAGNOSIS — M62.81 MUSCLE WEAKNESS: ICD-10-CM

## 2019-09-10 DIAGNOSIS — M25.632 STIFFNESS OF LEFT WRIST JOINT: ICD-10-CM

## 2019-09-10 DIAGNOSIS — M79.602 LEFT ARM PAIN: ICD-10-CM

## 2019-09-10 PROCEDURE — 97140 MANUAL THERAPY 1/> REGIONS: CPT | Mod: PO

## 2019-09-10 PROCEDURE — 97110 THERAPEUTIC EXERCISES: CPT | Mod: PO

## 2019-09-10 PROCEDURE — 97018 PARAFFIN BATH THERAPY: CPT | Mod: PO,59

## 2019-09-10 NOTE — PROGRESS NOTES
"  Occupational Therapy Daily Treatment Note     Date: 9/10/2019  Name: Sincere Riggins  Clinic Number: [id number]    Therapy Diagnosis:   Encounter Diagnoses   Name Primary?    Left arm pain     Stiffness of left wrist joint     Muscle weakness     Sensory disturbance      Physician: Vadim Harp Jr*    Physician Orders: Eval & Treat, post op care  Medical Diagnosis: L CTR & CuTR  Surgical Procedure and Date:  L CTR & CuTR, 06/07/19  Evaluation Date: 06/27/19  Insurance Authorization Period Expiration: 12/31/19      Visit # / Visits authorized: 18 / 30  Time In: 1:10 pm  Time Out:  2:00 pm  Total Billable Time: 50 minutes (P, 1MT, 2TE)    Precautions:  Standard      Subjective     Pt reports: "The pain is not too bad right now. I still have more pain in the afternoon, but it's not as bad"  she was compliant with home exercise program given last session.   Response to previous treatment: decreased pain and increased AROM   Functional change: able to hold small light objects, able to make a loose composite fist    Pain:  2-3/10 pre-tx;   Location: left elbow/wrist/hand      Objective     Sincere received the following supervised modalities after being cleared for contradictions for10 minutes:   -Paraffin w/ MHP to L wrist/hand, 10 minutes, pre-tx to decrease pain & increase tissue extensibility    Sincere received manual therapy for 15 minutes, as follows:  -Edema mgmt w/ lymphatic drainage technique;  Deep STM, including MFR, CFM, TPR & scar mgmt to L elbow/forearm/wrist/hand, using hand techniques and IASTM tools to increase blood flow/circulation, improve soft tissue pliability and decrease pain.     Sincere received therapeutic exercises for 25 minutes including:    Dexterciser  3 min   Isospheres 3 min   9 Peg  3 trial, remove/replace    Octopus In-hand Manipulatiom (not today) 3 trials   Elbow-3 ways (not today) 10 reps, each way, 3# wt   Smiles & Frowns 2/10 reps each way, red T-bar   Wrist 3 ways over wedge " (not today) 2/10 reps, 2# wt         T-Putty Yellow/green   -Molding 3 min   -Graspmanipulation 3 reps   -RF/SF Raking (not today) 3 trials, w/ yellow putty & therapist assist   -Thumb adduction pinches (not today) 10 reps, yellow, w/ assist for IP extension   -Log rolls w/ tripod pinch 2 logs   -Cones (not today) 3 reps            Home Exercises and Education Provided     Education provided:   - Reviewed HEP     Written Home Exercises Provided: Patient instructed to cont prior HEP.  Exercises were reviewed and Sincere was able to demonstrate them prior to the end of the session.  Sincere demonstrated good  understanding of the HEP provided.     See EMR under Patient Instructions for exercises provided prior visit.        Assessment     Patient with decreased claw posturing of L RF & SF noted today.  She is tolerating treatment well.  Recommend to continue focus on strengthening and coordination.  Patient's prognosis continues to be Good.     Pt will continue to benefit from skilled outpatient occupational therapy to address the deficits listed in the problem list on initial evaluation provide pt/family education and to maximize pt's level of independence in the home and community environment.     Anticipated barriers to occupational therapy: pain    Pt's spiritual, cultural and educational needs considered and pt agreeable to plan of care and goals.    Goals:  Short Term Goals: (in 2 weeks-07/10/19))  1) Patient will be independent in HEP-met  2) Decrease pain in L elbow/wrist/hand to no more than 4-5/10 worst in ADL/IADL's-part met  3) Increase AROM in L wrist Extension by 10 degrees for improved functioning in ADL/IADL's-part met  4) Assess  & pinch strength-met  5) Decrease edema in L wrist by .2-.3 cm-met   6) Increase L IF-SF COSTA's by 10-15 degrees for increased functional use of hand in ADL/IADL's-met     Long Term Goals: (in 6 weeks-08/07/19)  1) Decrease pain in L elbow/wrist/hand to no more than 1-2/10 worst  in ADL/IADL's   2) Increase AROM of L elbow/wrist/hand to WFL for increased functioning in ADL/IADL's  3) Increase strength in L  & pinch by 20% of initial measures for improved functioning in ADL/IADL's  4) Increased functioning in ADL/IADL's, as evidenced by a FOTO impairment rating of no more than 35%  5) Decrease edema in L elbow/wrist/hand to trace or none    Plan     Updated Certification Period: 8/8/2019 to 09/20/19    Recommended Treatment Plan: 2 times per week for 6 weeks: Electrical Stimulation IFC, Manual Therapy, Moist Heat/ Ice, Neuromuscular Re-ed, Orthotic Management and Training, Paraffin, Patient Education, Self Care, Therapeutic Activites, Therapeutic Exercise and Ultrasound  Other Recommendations: Patient may benefit from a custom L RF/SF orthosis to address developing Boutonierre deformity      Joseph Carlin, OT

## 2019-09-12 ENCOUNTER — CLINICAL SUPPORT (OUTPATIENT)
Dept: REHABILITATION | Facility: HOSPITAL | Age: 59
End: 2019-09-12
Payer: COMMERCIAL

## 2019-09-12 DIAGNOSIS — M62.81 MUSCLE WEAKNESS: ICD-10-CM

## 2019-09-12 DIAGNOSIS — R20.9 SENSORY DISTURBANCE: ICD-10-CM

## 2019-09-12 DIAGNOSIS — M25.632 STIFFNESS OF LEFT WRIST JOINT: ICD-10-CM

## 2019-09-12 DIAGNOSIS — M79.602 LEFT ARM PAIN: ICD-10-CM

## 2019-09-12 PROCEDURE — 97140 MANUAL THERAPY 1/> REGIONS: CPT | Mod: PO

## 2019-09-12 PROCEDURE — 97110 THERAPEUTIC EXERCISES: CPT | Mod: PO

## 2019-09-12 PROCEDURE — 97035 APP MDLTY 1+ULTRASOUND EA 15: CPT | Mod: PO

## 2019-09-12 NOTE — PROGRESS NOTES
"  Occupational Therapy Daily Treatment Note     Date: 9/12/2019  Name: Sincere Riggins  Clinic Number: [id number]    Therapy Diagnosis:   Encounter Diagnoses   Name Primary?    Left arm pain     Stiffness of left wrist joint     Muscle weakness     Sensory disturbance      Physician: Vadim Harp Jr*    Physician Orders: Eval & Treat, post op care  Medical Diagnosis: L CTR & CuTR  Surgical Procedure and Date:  L CTR & CuTR, 06/07/19  Evaluation Date: 06/27/19  Insurance Authorization Period Expiration: 12/31/19      Visit # / Visits authorized: 19 / 30  Time In: 1:05 pm  Time Out:  2:00 pm  Total Billable Time: 55 minutes (1US, 1MT, 2TE)    Precautions:  Standard      Subjective     Pt reports: "My fingers are getting straighter" (referring to L RF & SF)  she was compliant with home exercise program given last session.   Response to previous treatment: decreased pain and increased AROM   Functional change: able to hold small light objects, able to make a loose composite fist    Pain:  3/10 pre-tx;   Location: left elbow/wrist/hand      Objective     Sincere received the following direct care modalities after being cleared for contradictions for10 minutes:   -3.3 MHz, 1.0 W/cm2, continuous, to L RF SF PIP (volar plate), 5 min each finger, to decrease pain & edema, increase circulation and tissue extensibility     Sincere received manual therapy for 15 minutes, as follows:  -Edema mgmt w/ lymphatic drainage technique;  Deep STM, including MFR, CFM, TPR & scar mgmt to L elbow/forearm/wrist/hand, using hand techniques and IASTM tools to increase blood flow/circulation, improve soft tissue pliability and decrease pain.     Sincere received therapeutic exercises for 30 minutes including:    Dexterciser  3 min   Isospheres 3 min   9 Peg  3 trial, remove/replace    Octopus In-hand Manipulatiom 3 trials   Elbow-3 ways 10 reps, each way, 3# wt   Smiles & Frowns 2/10 reps each way, red T-bar   Wrist 3 ways over wedge  2/10 " reps, 2# wt         T-Putty Red   -Molding 3 min   -Graspmanipulation 3 reps   -RF/SF Raking (not today) 3 trials, w/ yellow putty & therapist assist   -Thumb adduction pinches (not today) 10 reps, yellow, w/ assist for IP extension   -Log rolls w/ tripod pinch 2 logs   -Cones (not today) 3 reps            Home Exercises and Education Provided     Education provided:   - Reviewed HEP     Written Home Exercises Provided: Patient instructed to cont prior HEP.  Exercises were reviewed and Sincere was able to demonstrate them prior to the end of the session.  Sincere demonstrated good  understanding of the HEP provided.     See EMR under Patient Instructions for exercises provided prior visit.        Assessment     Patient continues to make improvement in L RF & SF PIP-DIP extension, with minimal clawing posture noted.  She continues to tolerate treatment well.  Recommend to continue focus on strengthening and coordination.  Patient's prognosis continues to be Good.     Pt will continue to benefit from skilled outpatient occupational therapy to address the deficits listed in the problem list on initial evaluation provide pt/family education and to maximize pt's level of independence in the home and community environment.     Anticipated barriers to occupational therapy: pain    Pt's spiritual, cultural and educational needs considered and pt agreeable to plan of care and goals.    Goals:  Short Term Goals: (in 2 weeks-07/10/19))  1) Patient will be independent in HEP-met  2) Decrease pain in L elbow/wrist/hand to no more than 4-5/10 worst in ADL/IADL's-part met  3) Increase AROM in L wrist Extension by 10 degrees for improved functioning in ADL/IADL's-part met  4) Assess  & pinch strength-met  5) Decrease edema in L wrist by .2-.3 cm-met   6) Increase L IF-SF COSTA's by 10-15 degrees for increased functional use of hand in ADL/IADL's-met     Long Term Goals: (in 6 weeks-08/07/19)  1) Decrease pain in L elbow/wrist/hand to  no more than 1-2/10 worst in ADL/IADL's   2) Increase AROM of L elbow/wrist/hand to WFL for increased functioning in ADL/IADL's  3) Increase strength in L  & pinch by 20% of initial measures for improved functioning in ADL/IADL's  4) Increased functioning in ADL/IADL's, as evidenced by a FOTO impairment rating of no more than 35%  5) Decrease edema in L elbow/wrist/hand to trace or none    Plan     Updated Certification Period: 8/8/2019 to 09/20/19    Recommended Treatment Plan: 2 times per week for 6 weeks: Electrical Stimulation IFC, Manual Therapy, Moist Heat/ Ice, Neuromuscular Re-ed, Orthotic Management and Training, Paraffin, Patient Education, Self Care, Therapeutic Activites, Therapeutic Exercise and Ultrasound  Other Recommendations: Patient may benefit from a custom L RF/SF orthosis to address developing Boutonierre deformity      Joseph Carlin, OT

## 2019-09-16 ENCOUNTER — CLINICAL SUPPORT (OUTPATIENT)
Dept: REHABILITATION | Facility: HOSPITAL | Age: 59
End: 2019-09-16
Payer: COMMERCIAL

## 2019-09-16 DIAGNOSIS — M25.632 STIFFNESS OF LEFT WRIST JOINT: ICD-10-CM

## 2019-09-16 DIAGNOSIS — M62.81 MUSCLE WEAKNESS: ICD-10-CM

## 2019-09-16 DIAGNOSIS — M79.602 LEFT ARM PAIN: ICD-10-CM

## 2019-09-16 DIAGNOSIS — R20.9 SENSORY DISTURBANCE: ICD-10-CM

## 2019-09-16 PROCEDURE — 97035 APP MDLTY 1+ULTRASOUND EA 15: CPT | Mod: PO

## 2019-09-16 PROCEDURE — 97140 MANUAL THERAPY 1/> REGIONS: CPT | Mod: PO

## 2019-09-16 PROCEDURE — 97110 THERAPEUTIC EXERCISES: CPT | Mod: PO

## 2019-09-16 NOTE — PROGRESS NOTES
"  Occupational Therapy Daily Treatment Note     Date: 9/16/2019  Name: Sincere Riggins  Clinic Number: [id number]    Therapy Diagnosis:   Encounter Diagnoses   Name Primary?    Left arm pain     Stiffness of left wrist joint     Muscle weakness     Sensory disturbance      Physician: Vadim Harp Jr*    Physician Orders: Eval & Treat, post op care  Medical Diagnosis: L CTR & CuTR  Surgical Procedure and Date:  L CTR & CuTR, 06/07/19  Evaluation Date: 06/27/19  Insurance Authorization Period Expiration: 12/31/19      Visit # / Visits authorized: 20 / 30  Time In: 1:05 pm  Time Out:  2:00 pm  Total Billable Time: 55 minutes (1US, 1MT, 2TE)    Precautions:  Standard      Subjective     Pt reports: "the pain is getting less and less and the stiffness in my ring finger is less, but the SF is still bad"  she was compliant with home exercise program given last session.   Response to previous treatment: decreased pain and increased AROM   Functional change: able to hold small light objects, able to make a loose composite fist    Pain:  3/10 pre-tx;   Location: left elbow/wrist/hand      Objective     Sincere received the following direct care modalities after being cleared for contradictions for10 minutes:   -Paraffin w/ MHP to L wrist/ hand with all digits in composite fist using Coban wrap, pre-tx to decrease pain & increase tissue extensibility     Sincere received manual therapy for 15 minutes, as follows:  -Edema mgmt w/ lymphatic drainage technique;  Deep STM, including MFR, CFM, TPR & scar mgmt to L elbow/forearm/wrist/hand, using hand techniques and IASTM tools to increase blood flow/circulation, improve soft tissue pliability and decrease pain.     Sincere received therapeutic exercises for 30 minutes including:    Dexterciser  3 min   Isospheres 3 min   9 Peg  3 trial, remove/replace    Octopus In-hand Manipulatiom 3 trials   Elbow-3 ways 10 reps, each way, 3# wt   Smiles & Frowns 2/10 reps each way, red T-bar "   Wrist 3 ways over wedge  2/10 reps, 2# wt         T-Putty Red   -Molding (not today)3 min   -Graspmanipulation (not today)3 reps   -RF/SF Raking (not today) 3 trials, w/ yellow putty & therapist assist   -Thumb adduction pinches (not today) 10 reps, yellow, w/ assist for IP extension   -Log rolls w/ tripod pinch (not today)2 logs   -Cones (not today) 3 reps            Home Exercises and Education Provided     Education provided:   -Coban wrap for RF & SF composite fist stretch using Coban wrap in HEP; patient was provided with a light compression sleeve for L SF and further education in edema mgmt     Written Home Exercises Provided: Patient instructed to cont prior HEP.  Exercises were reviewed and Sincere was able to demonstrate them prior to the end of the session.  Sincere demonstrated good  understanding of the HEP provided.     See EMR under Patient Instructions for exercises provided prior visit.        Assessment     Patient tolerated treatment well today and was noted to have an increase in composite finger flexion after treatment today.  She would benefit from a LLLD stretch of L RF & SF HEP to progress ROM and hand functioning.  Edema continues to be present in her L RF & SF, but has improved since initial evaluation.  She continues to tolerate treatment well.  Recommend to continue focus on strengthening and coordination.  Patient's prognosis continues to be Good.     Pt will continue to benefit from skilled outpatient occupational therapy to address the deficits listed in the problem list on initial evaluation provide pt/family education and to maximize pt's level of independence in the home and community environment.     Anticipated barriers to occupational therapy: pain    Pt's spiritual, cultural and educational needs considered and pt agreeable to plan of care and goals.    Goals:  Short Term Goals: (in 2 weeks-07/10/19))  1) Patient will be independent in HEP-met  2) Decrease pain in L elbow/wrist/hand  to no more than 4-5/10 worst in ADL/IADL's-part met  3) Increase AROM in L wrist Extension by 10 degrees for improved functioning in ADL/IADL's-part met  4) Assess  & pinch strength-met  5) Decrease edema in L wrist by .2-.3 cm-met   6) Increase L IF-SF COSTA's by 10-15 degrees for increased functional use of hand in ADL/IADL's-met     Long Term Goals: (in 6 weeks-08/07/19)  1) Decrease pain in L elbow/wrist/hand to no more than 1-2/10 worst in ADL/IADL's   2) Increase AROM of L elbow/wrist/hand to WFL for increased functioning in ADL/IADL's  3) Increase strength in L  & pinch by 20% of initial measures for improved functioning in ADL/IADL's  4) Increased functioning in ADL/IADL's, as evidenced by a FOTO impairment rating of no more than 35%  5) Decrease edema in L elbow/wrist/hand to trace or none    Plan     Updated Certification Period: 8/8/2019 to 09/20/19    Recommended Treatment Plan: 2 times per week for 6 weeks: Electrical Stimulation IFC, Manual Therapy, Moist Heat/ Ice, Neuromuscular Re-ed, Orthotic Management and Training, Paraffin, Patient Education, Self Care, Therapeutic Activites, Therapeutic Exercise and Ultrasound  Other Recommendations: Patient may benefit from a custom L RF/SF orthosis to address developing Boutonierre deformity      Joseph Carlin, OT

## 2019-09-19 ENCOUNTER — CLINICAL SUPPORT (OUTPATIENT)
Dept: REHABILITATION | Facility: HOSPITAL | Age: 59
End: 2019-09-19
Payer: COMMERCIAL

## 2019-09-19 DIAGNOSIS — R20.9 SENSORY DISTURBANCE: ICD-10-CM

## 2019-09-19 DIAGNOSIS — M25.632 STIFFNESS OF LEFT WRIST JOINT: ICD-10-CM

## 2019-09-19 DIAGNOSIS — M79.602 LEFT ARM PAIN: ICD-10-CM

## 2019-09-19 DIAGNOSIS — M62.81 MUSCLE WEAKNESS: ICD-10-CM

## 2019-09-19 PROCEDURE — 97110 THERAPEUTIC EXERCISES: CPT | Mod: PO

## 2019-09-19 PROCEDURE — 97018 PARAFFIN BATH THERAPY: CPT | Mod: PO,59

## 2019-09-19 PROCEDURE — 97140 MANUAL THERAPY 1/> REGIONS: CPT | Mod: PO

## 2019-09-19 NOTE — PROGRESS NOTES
"  Occupational Therapy Daily Treatment Note     Date: 9/19/2019  Name: Sincere Riggins  Clinic Number: [id number]    Therapy Diagnosis:   Encounter Diagnoses   Name Primary?    Left arm pain     Stiffness of left wrist joint     Muscle weakness     Sensory disturbance      Physician: Vadim Harp Jr*    Physician Orders: Eval & Treat, post op care  Medical Diagnosis: L CTR & CuTR  Surgical Procedure and Date:  L CTR & CuTR, 06/07/19  Evaluation Date: 06/27/19  Insurance Authorization Period Expiration: 12/31/19      Visit # / Visits authorized: 21 / 30  Time In: 1:10 pm  Time Out:  2:05 pm  Total Billable Time: 55 minutes (P, 1MT, 2TE)    Precautions:  Standard      Subjective     Pt reports: "The area in my palm that hurt a lot before is getting better"  she was compliant with home exercise program given last session.   Response to previous treatment: decreased pain and increased AROM   Functional change: able to hold small light objects, able to make a loose composite fist    Pain:  1-2/10 pre-tx; 4/10 at night   Location: left elbow/wrist/hand      Objective     Sincere received the following supervised modalities after being cleared for contradictions for10 minutes:   -Paraffin w/ MHP to L wrist/ hand with all digits in composite fist using Coban wrap, pre-tx to decrease pain & increase tissue extensibility     Sincere received manual therapy for 10 minutes, as follows:  -Edema mgmt w/ lymphatic drainage technique;  Deep STM, including MFR, CFM, TPR & scar mgmt to L elbow/forearm/wrist/hand, using hand techniques and IASTM tools to increase blood flow/circulation, improve soft tissue pliability and decrease pain.     Sincere received therapeutic exercises for 35 minutes including:    Dexterciser  3 min   Isospheres 3 min   9 Peg  3 trial, remove/replace    Octopus In-hand Manipulatiom 3 trials   Elbow-3 ways 10 reps, each way, 3# wt   Smiles & Frowns 2/10 reps each way, red T-bar   Wrist 3 ways over wedge  2/10 " reps, 2# wt         T-Putty Red   -Molding (not today)3 min   -Graspmanipulation (not today)3 reps   -RF/SF Raking (not today) 3 trials, w/ yellow putty & therapist assist   -Thumb adduction pinches (not today) 10 reps, yellow, w/ assist for IP extension   -Log rolls w/ tripod pinch (not today)2 logs   -Cones (not today) 3 reps            Home Exercises and Education Provided     Education provided:   -Coban wrap for RF & SF composite fist stretch using Coban wrap in HEP; patient was provided with a light compression sleeve for L SF and further education in edema mgmt     Written Home Exercises Provided: Patient instructed to cont prior HEP.  Exercises were reviewed and Sincere was able to demonstrate them prior to the end of the session.  Sincere demonstrated good  understanding of the HEP provided.     See EMR under Patient Instructions for exercises provided prior visit.        Assessment     Patient noted to have less scar/fibrotic tissue in her L wrist/hand today.  She is having minimal pain and continues to decrease daily.  Edema continues to be present in her L RF & SF, but continues to improve with each visit. She is tolerating treatment well.  Recommend to continue focus on strengthening and coordination.  Patient's prognosis continues to be Good.     Pt will continue to benefit from skilled outpatient occupational therapy to address the deficits listed in the problem list on initial evaluation provide pt/family education and to maximize pt's level of independence in the home and community environment.     Anticipated barriers to occupational therapy: pain    Pt's spiritual, cultural and educational needs considered and pt agreeable to plan of care and goals.    Goals:  Short Term Goals: (in 2 weeks-07/10/19))  1) Patient will be independent in HEP-met  2) Decrease pain in L elbow/wrist/hand to no more than 4-5/10 worst in ADL/IADL's-part met  3) Increase AROM in L wrist Extension by 10 degrees for improved  functioning in ADL/IADL's-part met  4) Assess  & pinch strength-met  5) Decrease edema in L wrist by .2-.3 cm-met   6) Increase L IF-SF COSTA's by 10-15 degrees for increased functional use of hand in ADL/IADL's-met     Long Term Goals: (in 6 weeks-08/07/19)  1) Decrease pain in L elbow/wrist/hand to no more than 1-2/10 worst in ADL/IADL's   2) Increase AROM of L elbow/wrist/hand to WFL for increased functioning in ADL/IADL's  3) Increase strength in L  & pinch by 20% of initial measures for improved functioning in ADL/IADL's  4) Increased functioning in ADL/IADL's, as evidenced by a FOTO impairment rating of no more than 35%  5) Decrease edema in L elbow/wrist/hand to trace or none    Plan     Updated Certification Period: 8/8/2019 to 09/20/19    Recommended Treatment Plan: 2 times per week for 6 weeks: Electrical Stimulation IFC, Manual Therapy, Moist Heat/ Ice, Neuromuscular Re-ed, Orthotic Management and Training, Paraffin, Patient Education, Self Care, Therapeutic Activites, Therapeutic Exercise and Ultrasound  Other Recommendations: Patient may benefit from a custom L RF/SF orthosis to address developing Boutonierre deformity      Joseph Carlin, OT

## 2019-09-25 ENCOUNTER — PATIENT MESSAGE (OUTPATIENT)
Dept: ORTHOPEDICS | Facility: CLINIC | Age: 59
End: 2019-09-25

## 2019-09-26 ENCOUNTER — CLINICAL SUPPORT (OUTPATIENT)
Dept: REHABILITATION | Facility: HOSPITAL | Age: 59
End: 2019-09-26
Payer: COMMERCIAL

## 2019-09-26 DIAGNOSIS — R20.9 SENSORY DISTURBANCE: ICD-10-CM

## 2019-09-26 DIAGNOSIS — M25.632 STIFFNESS OF LEFT WRIST JOINT: ICD-10-CM

## 2019-09-26 DIAGNOSIS — M62.81 MUSCLE WEAKNESS: ICD-10-CM

## 2019-09-26 DIAGNOSIS — M79.602 LEFT ARM PAIN: ICD-10-CM

## 2019-09-26 PROCEDURE — 97018 PARAFFIN BATH THERAPY: CPT | Mod: PO

## 2019-09-26 PROCEDURE — 97110 THERAPEUTIC EXERCISES: CPT | Mod: PO

## 2019-09-26 PROCEDURE — 97140 MANUAL THERAPY 1/> REGIONS: CPT | Mod: PO

## 2019-09-26 NOTE — PROGRESS NOTES
"  Occupational Therapy Daily Treatment Note     Date: 9/26/2019  Name: Sincere Riggins  Clinic Number: [id number]    Therapy Diagnosis:   Encounter Diagnoses   Name Primary?    Left arm pain     Stiffness of left wrist joint     Muscle weakness     Sensory disturbance      Physician: Vadim Harp Jr*    Physician Orders: Eval & Treat, post op care  Medical Diagnosis: L CTR & CuTR  Surgical Procedure and Date:  L CTR & CuTR, 06/07/19  Evaluation Date: 06/27/19  Insurance Authorization Period Expiration: 12/31/19      Visit # / Visits authorized: 22 / 30  Time In: 1:05 pm  Time Out:  2:05 pm  Total Billable Time: 60 minutes (P, 1MT, 2TE)    Precautions:  Standard    Subjective     Pt reports: "I'm still wearing the glove for the swelling and my splints like you told me"  she was compliant with home exercise program given last session.   Response to previous treatment: decreased pain and increased AROM   Functional change: able to hold small light objects, able to make a loose composite fist    Pain:  1-2/10 pre-tx; 3-4/10 at night   Location: left elbow/wrist/hand      Objective     Sincere received the following supervised modalities after being cleared for contradictions for10 minutes:   -Paraffin w/ MHP to L wrist/ hand with all digits in composite fist using Coban wrap, pre-tx to decrease pain & increase tissue extensibility     Sincere received manual therapy for 15 minutes, as follows:  -Edema mgmt w/ lymphatic drainage technique;  Deep STM, including MFR, CFM, TPR & scar mgmt to L elbow/forearm/wrist/hand, using hand techniques and IASTM tools to increase blood flow/circulation, improve soft tissue pliability and decrease pain.  -Kinesiotaping: spiral wrap applied to L RF & SF for edema. Patient instructed on purpose, wear, care, precautions to monitor and removal of KT. Patient verbalized understanding of all instructions provided.       Sincere received therapeutic exercises for 35 minutes including:  "   Dexterciser  3 min   Isospheres 3 min   9 Peg  3 trial, remove/replace    Octopus In-hand Manipulatiom 3 trials   Elbow-3 ways 10 reps, each way, 3# wt   Smiles & Frowns 2/10 reps each way, red T-bar   Wrist 3 ways over wedge  2/10 reps, 2# wt         T-Putty Red   -Molding 3 min   -Graspmanipulation 3 reps   -RF/SF Raking 3 trials, w/ yellow putty & therapist assist   -Thumb adduction pinches (not today) 10 reps, yellow, w/ assist for IP extension   -Log rolls w/ tripod pinch 2 logs   -Cones (not today) 3 reps            Home Exercises and Education Provided     Education provided:   -Coban wrap for RF & SF composite fist stretch using Coban wrap in HEP; patient was provided with a light compression sleeve for L SF and further education in edema mgmt     Written Home Exercises Provided: Patient instructed to cont prior HEP.  Exercises were reviewed and Sincere was able to demonstrate them prior to the end of the session.  Sincere demonstrated good  understanding of the HEP provided.     See EMR under Patient Instructions for exercises provided prior visit.        Assessment     Patient presents with less claw posturing of L RF & SF, as her A/PROM in extension is improving.  She continues to present with less scar/fibrotic tissue in her L wrist/hand.  Edema continues to decrease as well ,however, has not completely resolved in L wrist/hand.  She is tolerating treatment well.  Recommend to continue focus on strengthening and coordination.  Patient's prognosis continues to be Good.     Pt will continue to benefit from skilled outpatient occupational therapy to address the deficits listed in the problem list on initial evaluation provide pt/family education and to maximize pt's level of independence in the home and community environment.     Anticipated barriers to occupational therapy: pain    Pt's spiritual, cultural and educational needs considered and pt agreeable to plan of care and goals.    Goals:  Short Term Goals:  (in 2 weeks-07/10/19))  1) Patient will be independent in HEP-met  2) Decrease pain in L elbow/wrist/hand to no more than 4-5/10 worst in ADL/IADL's-part met  3) Increase AROM in L wrist Extension by 10 degrees for improved functioning in ADL/IADL's-part met  4) Assess  & pinch strength-met  5) Decrease edema in L wrist by .2-.3 cm-met   6) Increase L IF-SF COSTA's by 10-15 degrees for increased functional use of hand in ADL/IADL's-met     Long Term Goals: (in 6 weeks-08/07/19)  1) Decrease pain in L elbow/wrist/hand to no more than 1-2/10 worst in ADL/IADL's   2) Increase AROM of L elbow/wrist/hand to WFL for increased functioning in ADL/IADL's  3) Increase strength in L  & pinch by 20% of initial measures for improved functioning in ADL/IADL's  4) Increased functioning in ADL/IADL's, as evidenced by a FOTO impairment rating of no more than 35%  5) Decrease edema in L elbow/wrist/hand to trace or none    Plan     Updated Certification Period: 8/8/2019 to 09/20/19    Recommended Treatment Plan: 2 times per week for 6 weeks: Electrical Stimulation IFC, Manual Therapy, Moist Heat/ Ice, Neuromuscular Re-ed, Orthotic Management and Training, Paraffin, Patient Education, Self Care, Therapeutic Activites, Therapeutic Exercise and Ultrasound  Other Recommendations: Patient may benefit from a custom L RF/SF orthosis to address developing Boutonierre deformity      Joseph Carlin, OT

## 2019-09-30 ENCOUNTER — CLINICAL SUPPORT (OUTPATIENT)
Dept: REHABILITATION | Facility: HOSPITAL | Age: 59
End: 2019-09-30
Payer: COMMERCIAL

## 2019-09-30 DIAGNOSIS — R20.9 SENSORY DISTURBANCE: ICD-10-CM

## 2019-09-30 DIAGNOSIS — M62.81 MUSCLE WEAKNESS: ICD-10-CM

## 2019-09-30 DIAGNOSIS — M25.632 STIFFNESS OF LEFT WRIST JOINT: ICD-10-CM

## 2019-09-30 DIAGNOSIS — M79.602 LEFT ARM PAIN: ICD-10-CM

## 2019-09-30 PROCEDURE — 97018 PARAFFIN BATH THERAPY: CPT | Mod: PO,59

## 2019-09-30 PROCEDURE — 97140 MANUAL THERAPY 1/> REGIONS: CPT | Mod: PO

## 2019-09-30 PROCEDURE — 97110 THERAPEUTIC EXERCISES: CPT | Mod: PO

## 2019-09-30 NOTE — PROGRESS NOTES
"  Occupational Therapy Daily Treatment Note     Date: 9/30/2019  Name: Sincere Riggins  Clinic Number: [id number]    Therapy Diagnosis:   Encounter Diagnoses   Name Primary?    Left arm pain     Stiffness of left wrist joint     Muscle weakness     Sensory disturbance      Physician: Vadim Harp Jr*    Physician Orders: Eval & Treat, post op care  Medical Diagnosis: L CTR & CuTR  Surgical Procedure and Date:  L CTR & CuTR, 06/07/19  Evaluation Date: 06/27/19  Insurance Authorization Period Expiration: 12/31/19      Visit # / Visits authorized: 25 / 30  Time In: 1:05 pm  Time Out:  2:05 pm  Total Billable Time: 60 minutes (P, 1MT, 2TE)    Precautions:  Standard    Subjective     Pt reports: "I can feel some tingling and return of feeling coming back into my hand in my little finger  she was compliant with home exercise program given last session.   Response to previous treatment: decreased pain and increased AROM   Functional change: able to hold small light objects, able to make a loose composite fist    Pain:  2/10 pre-tx; 2-3/10 at night   Location: left elbow/wrist/hand      Objective     Sincere received the following supervised modalities after being cleared for contradictions for10 minutes:   -Paraffin w/ MHP to L wrist/ hand with all digits in composite fist using Coban wrap, pre-tx to decrease pain & increase tissue extensibility     Sincere received manual therapy for 15 minutes, as follows:  -Edema mgmt w/ lymphatic drainage technique;  Deep STM, including MFR, CFM, TPR & scar mgmt to L elbow/forearm/wrist/hand, using hand techniques and IASTM tools to increase blood flow/circulation, improve soft tissue pliability and decrease pain.  -Kinesiotaping: spiral wrap applied to L RF & SF for edema. Patient instructed on purpose, wear, care, precautions to monitor and removal of KT. Patient verbalized understanding of all instructions provided.       Sincere received therapeutic exercises for 35 minutes " including:    Dexterciser  3 min   Isospheres 3 min   9 Peg  3 trial, remove/replace    Octopus In-hand Manipulatiom 3 trials   Elbow-3 ways 10 reps, each way, 3# wt   Smiles & Frowns 2/10 reps each way, red T-bar   Wrist 3 ways over wedge  2/10 reps, 2# wt         T-Putty Red   -Molding 3 min   -Graspmanipulation 3 reps   -RF/SF Raking 3 trials, w/ yellow putty & therapist assist   -Thumb adduction pinches (not today) 10 reps, yellow, w/ assist for IP extension   -Log rolls w/ tripod pinch 2 logs   -Cones (not today) 3 reps            Home Exercises and Education Provided     Education provided:   -Coban wrap for RF & SF composite fist stretch using Coban wrap in HEP; patient was provided with a light compression sleeve for L SF and further education in edema mgmt     Written Home Exercises Provided: Patient instructed to cont prior HEP.  Exercises were reviewed and Sincere was able to demonstrate them prior to the end of the session.  Sincere demonstrated good  understanding of the HEP provided.     See EMR under Patient Instructions for exercises provided prior visit.        Assessment     Patient is making steady gains in L elbow-hand functioning as sensation and nerve function is gradually returning.  She continues to present with edema and scar/fibrotic tissue in her L wrist/hand, but this continues to improve with each treatment.  She is tolerating treatment well.  Recommend to continue focus on strengthening and coordination.  Patient's prognosis continues to be Good.     Pt will continue to benefit from skilled outpatient occupational therapy to address the deficits listed in the problem list on initial evaluation provide pt/family education and to maximize pt's level of independence in the home and community environment.     Anticipated barriers to occupational therapy: pain    Pt's spiritual, cultural and educational needs considered and pt agreeable to plan of care and goals.    Goals:  Short Term Goals: (in 2  weeks-07/10/19))  1) Patient will be independent in HEP-met  2) Decrease pain in L elbow/wrist/hand to no more than 4-5/10 worst in ADL/IADL's-part met  3) Increase AROM in L wrist Extension by 10 degrees for improved functioning in ADL/IADL's-part met  4) Assess  & pinch strength-met  5) Decrease edema in L wrist by .2-.3 cm-met   6) Increase L IF-SF COSTA's by 10-15 degrees for increased functional use of hand in ADL/IADL's-met     Long Term Goals: (in 6 weeks-08/07/19)  1) Decrease pain in L elbow/wrist/hand to no more than 1-2/10 worst in ADL/IADL's   2) Increase AROM of L elbow/wrist/hand to WFL for increased functioning in ADL/IADL's  3) Increase strength in L  & pinch by 20% of initial measures for improved functioning in ADL/IADL's  4) Increased functioning in ADL/IADL's, as evidenced by a FOTO impairment rating of no more than 35%  5) Decrease edema in L elbow/wrist/hand to trace or none    Plan     Updated Certification Period: 8/8/2019 to 09/20/19    Recommended Treatment Plan: 2 times per week for 6 weeks: Electrical Stimulation IFC, Manual Therapy, Moist Heat/ Ice, Neuromuscular Re-ed, Orthotic Management and Training, Paraffin, Patient Education, Self Care, Therapeutic Activites, Therapeutic Exercise and Ultrasound  Other Recommendations: Patient may benefit from a custom L RF/SF orthosis to address developing Boutonierre deformity      Joseph Carlin, OT

## 2019-10-03 ENCOUNTER — CLINICAL SUPPORT (OUTPATIENT)
Dept: REHABILITATION | Facility: HOSPITAL | Age: 59
End: 2019-10-03
Payer: COMMERCIAL

## 2019-10-03 DIAGNOSIS — R20.9 SENSORY DISTURBANCE: ICD-10-CM

## 2019-10-03 DIAGNOSIS — M79.602 LEFT ARM PAIN: ICD-10-CM

## 2019-10-03 DIAGNOSIS — M62.81 MUSCLE WEAKNESS: ICD-10-CM

## 2019-10-03 DIAGNOSIS — M25.632 STIFFNESS OF LEFT WRIST JOINT: ICD-10-CM

## 2019-10-03 PROCEDURE — 97018 PARAFFIN BATH THERAPY: CPT | Mod: PO,59

## 2019-10-03 PROCEDURE — 97110 THERAPEUTIC EXERCISES: CPT | Mod: PO

## 2019-10-03 PROCEDURE — 97140 MANUAL THERAPY 1/> REGIONS: CPT | Mod: PO

## 2019-10-03 NOTE — PROGRESS NOTES
"  Occupational Therapy Daily Treatment Note     Date: 10/3/2019  Name: Sincere Riggins  Clinic Number: [id number]    Therapy Diagnosis:   Encounter Diagnoses   Name Primary?    Left arm pain     Stiffness of left wrist joint     Muscle weakness     Sensory disturbance      Physician: Vadim Harp Jr*    Physician Orders: Eval & Treat, post op care  Medical Diagnosis: L CTR & CuTR  Surgical Procedure and Date:  L CTR & CuTR, 06/07/19  Evaluation Date: 06/27/19  Insurance Authorization Period Expiration: 12/31/19      Visit # / Visits authorized: 26 / 30  Time In: 1:00 pm  Time Out:  1:55 pm  Total Billable Time: 55 minutes (P, 2MT, 1TE)    Precautions:  Standard    Subjective     Pt reports: "i've been wearing the glove and I can tell it really helps with the swelling"  she was compliant with home exercise program given last session.   Response to previous treatment: decreased pain and increased AROM   Functional change: able to hold small light objects, able to make a loose composite fist    Pain:  1-2/10 pre-tx; 2/10 at night   Location: left elbow/wrist/hand      Objective     Sincere received the following supervised modalities after being cleared for contradictions for10 minutes:   -Paraffin w/ MHP to L wrist/ hand with all digits in composite fist using Coban wrap, pre-tx to decrease pain & increase tissue extensibility     Sincere received manual therapy for 25 minutes, as follows:  -Edema mgmt w/ lymphatic drainage technique;  Deep STM, including MFR, CFM, TPR & scar mgmt to L elbow/forearm/wrist/hand, using hand techniques and IASTM tools to increase blood flow/circulation, improve soft tissue pliability and decrease pain.  -Joint mobilizations: L RF & SF PIP, al planes of mobility, grade 2-3, including distractions  -Kinesiotaping: spiral wrap applied to L RF & SF for edema.       Sincere received therapeutic exercises for 15 minutes including:    A/AA/PROM    -TGE's (wave, hook, straight & full fist) 10 " reps each   -Isolated RF & SF PIP E/F 10 reps each   -composite fist place & hold 10 reps, 5 sec hold   Dexterciser  3 min   Isospheres 3 min   9 Peg  3 trial, remove/replace       Home Exercises and Education Provided     Education provided:   -Coban wrap for RF & SF composite fist stretch using Coban wrap in HEP; patient was provided with a light compression sleeve for L SF and further education in edema mgmt     Written Home Exercises Provided: Patient instructed to cont prior HEP.  Exercises were reviewed and Sincere was able to demonstrate them prior to the end of the session.  Sincere demonstrated good  understanding of the HEP provided.     See EMR under Patient Instructions for exercises provided prior visit.      Assessment     Patient noted to have less edema in her L hand.  She is also noted to have improvement in muscle mass in thenar & hypothenar eminences of L hand.  She is now able to make a full composite fist without difficulty.  She continues to tolerate treatment well.  Recommend to continue focus on strengthening and coordination.  Patient's prognosis continues to be Good.     Pt will continue to benefit from skilled outpatient occupational therapy to address the deficits listed in the problem list on initial evaluation provide pt/family education and to maximize pt's level of independence in the home and community environment.     Anticipated barriers to occupational therapy: pain    Pt's spiritual, cultural and educational needs considered and pt agreeable to plan of care and goals.    Goals:  Short Term Goals: (in 2 weeks-07/10/19))  1) Patient will be independent in HEP-met  2) Decrease pain in L elbow/wrist/hand to no more than 4-5/10 worst in ADL/IADL's-part met  3) Increase AROM in L wrist Extension by 10 degrees for improved functioning in ADL/IADL's-part met  4) Assess  & pinch strength-met  5) Decrease edema in L wrist by .2-.3 cm-met   6) Increase L IF-SF COSTA's by 10-15 degrees for  increased functional use of hand in ADL/IADL's-met     Long Term Goals: (in 6 weeks-08/07/19)  1) Decrease pain in L elbow/wrist/hand to no more than 1-2/10 worst in ADL/IADL's   2) Increase AROM of L elbow/wrist/hand to WFL for increased functioning in ADL/IADL's  3) Increase strength in L  & pinch by 20% of initial measures for improved functioning in ADL/IADL's  4) Increased functioning in ADL/IADL's, as evidenced by a FOTO impairment rating of no more than 35%  5) Decrease edema in L elbow/wrist/hand to trace or none    Plan     Updated Certification Period: 8/8/2019 to 09/20/19    Recommended Treatment Plan: 2 times per week for 6 weeks: Electrical Stimulation IFC, Manual Therapy, Moist Heat/ Ice, Neuromuscular Re-ed, Orthotic Management and Training, Paraffin, Patient Education, Self Care, Therapeutic Activites, Therapeutic Exercise and Ultrasound  Other Recommendations: Patient may benefit from a custom L RF/SF orthosis to address developing Boutonierre deformity      Joseph Carlin, OT

## 2019-10-07 ENCOUNTER — CLINICAL SUPPORT (OUTPATIENT)
Dept: REHABILITATION | Facility: HOSPITAL | Age: 59
End: 2019-10-07
Payer: COMMERCIAL

## 2019-10-07 DIAGNOSIS — M62.81 MUSCLE WEAKNESS: ICD-10-CM

## 2019-10-07 DIAGNOSIS — M25.632 STIFFNESS OF LEFT WRIST JOINT: ICD-10-CM

## 2019-10-07 DIAGNOSIS — M79.602 LEFT ARM PAIN: ICD-10-CM

## 2019-10-07 DIAGNOSIS — R20.9 SENSORY DISTURBANCE: ICD-10-CM

## 2019-10-07 PROCEDURE — 97140 MANUAL THERAPY 1/> REGIONS: CPT | Mod: PO

## 2019-10-07 PROCEDURE — 97018 PARAFFIN BATH THERAPY: CPT | Mod: PO,59

## 2019-10-07 PROCEDURE — 97110 THERAPEUTIC EXERCISES: CPT | Mod: PO

## 2019-10-07 NOTE — PROGRESS NOTES
"  Occupational Therapy Daily Treatment Note     Date: 10/7/2019  Name: Sincere Riggins  Clinic Number: [id number]    Therapy Diagnosis:   Encounter Diagnoses   Name Primary?    Left arm pain     Stiffness of left wrist joint     Muscle weakness     Sensory disturbance      Physician: Vadim Harp Jr*    Physician Orders: Eval & Treat, post op care  Medical Diagnosis: L CTR & CuTR  Surgical Procedure and Date:  L CTR & CuTR, 06/07/19  Evaluation Date: 06/27/19  Insurance Authorization Period Expiration: 12/31/19      Visit # / Visits authorized: 27 / 30  Time In: 1:10 pm  Time Out:  2:10 pm  Total Billable Time: 60 minutes (P, 2MT, 1TE)    Precautions:  Standard    Subjective     Pt reports: "The nerve is bothering me, but everything else is getting better. The swelling is still there, but it's getting better too "  she was compliant with home exercise program given last session.   Response to previous treatment: decreased pain and increased AROM   Functional change: able to hold small light objects, able to make a loose composite fist    Pain:  1-2/10 pre-tx; 2/10 at night   Location: left elbow/wrist/hand      Objective     Sincere received the following supervised modalities after being cleared for contradictions for10 minutes:   -Paraffin w/ MHP to L wrist/ hand with all digits in composite fist using Coban wrap, pre-tx to decrease pain & increase tissue extensibility     Sincere received manual therapy for 25 minutes, as follows:  -Edema mgmt w/ lymphatic drainage technique;  Deep STM, including MFR, CFM, TPR & scar mgmt to L elbow/forearm/wrist/hand, using hand techniques and IASTM tools to increase blood flow/circulation, improve soft tissue pliability and decrease pain.  -Joint mobilizations: L RF & SF PIP, al planes of mobility, grade 2-3, including distractions      Sincere received therapeutic exercises for 25 minutes including:    AROM    -TGE's (wve, hook, straight & full fist) 5 reps each   -Isolated " RF/SF PIP & DIP E/F 10 reps       Isospheres 3 min   9 Peg  3 trial, remove/replace    Octopus In-hand Manipulatiom 3 trials   Elbow-3 ways 10 reps, each way, 3# wt   Smiles & Frowns 2/10 reps each way, red T-bar   Wrist 3 ways over wedge  2/10 reps, 2# wt         T-Putty    -Molding 3 min, red   -Graspmanipulation 3 reps, red   -RF/SF Raking 3 trials, w/ yellow putty & therapist assist   -Thumb adduction pinches (not today) 10 reps, yellow, w/ assist for IP extension   -Log rolls w/ tripod pinch 2 logs   -Cones 3 reps, yellow   -Dowel digs 3 min, yellow/green        Home Exercises and Education Provided     Education provided:   -Coban wrap for RF & SF composite fist stretch using Coban wrap in HEP; patient was provided with a light compression sleeve for L SF and further education in edema mgmt     Written Home Exercises Provided: Patient instructed to cont prior HEP.  Exercises were reviewed and Sincere was able to demonstrate them prior to the end of the session.  Sincere demonstrated good  understanding of the HEP provided.     See EMR under Patient Instructions for exercises provided prior visit.      Assessment     Edema continues to decrease in patient's L hand, but has not completely resolved.  She is presenting with less claw posturing of L RF & SF, but deficits remain in extension of fingers.  Strength continues to improve, but remains a problem.  She continues to tolerate treatment well.  Recommend to continue focus on strengthening and coordination.  Patient's prognosis continues to be Good.     Pt will continue to benefit from skilled outpatient occupational therapy to address the deficits listed in the problem list on initial evaluation provide pt/family education and to maximize pt's level of independence in the home and community environment.     Anticipated barriers to occupational therapy: pain    Pt's spiritual, cultural and educational needs considered and pt agreeable to plan of care and  goals.    Goals:  Short Term Goals: (in 2 weeks-07/10/19))  1) Patient will be independent in HEP-met  2) Decrease pain in L elbow/wrist/hand to no more than 4-5/10 worst in ADL/IADL's-part met  3) Increase AROM in L wrist Extension by 10 degrees for improved functioning in ADL/IADL's-part met  4) Assess  & pinch strength-met  5) Decrease edema in L wrist by .2-.3 cm-met   6) Increase L IF-SF COSTA's by 10-15 degrees for increased functional use of hand in ADL/IADL's-met     Long Term Goals: (in 6 weeks-08/07/19)  1) Decrease pain in L elbow/wrist/hand to no more than 1-2/10 worst in ADL/IADL's   2) Increase AROM of L elbow/wrist/hand to WFL for increased functioning in ADL/IADL's  3) Increase strength in L  & pinch by 20% of initial measures for improved functioning in ADL/IADL's  4) Increased functioning in ADL/IADL's, as evidenced by a FOTO impairment rating of no more than 35%  5) Decrease edema in L elbow/wrist/hand to trace or none    Plan     Updated Certification Period: 8/8/2019 to 09/20/19    Recommended Treatment Plan: 2 times per week for 6 weeks: Electrical Stimulation IFC, Manual Therapy, Moist Heat/ Ice, Neuromuscular Re-ed, Orthotic Management and Training, Paraffin, Patient Education, Self Care, Therapeutic Activites, Therapeutic Exercise and Ultrasound  Other Recommendations: Patient may benefit from a custom L RF/SF orthosis to address developing Boutonierre deformity      Joseph Carlin, OT

## 2019-10-14 ENCOUNTER — CLINICAL SUPPORT (OUTPATIENT)
Dept: REHABILITATION | Facility: HOSPITAL | Age: 59
End: 2019-10-14
Payer: COMMERCIAL

## 2019-10-14 DIAGNOSIS — R20.9 SENSORY DISTURBANCE: ICD-10-CM

## 2019-10-14 DIAGNOSIS — M79.602 LEFT ARM PAIN: ICD-10-CM

## 2019-10-14 DIAGNOSIS — M62.81 MUSCLE WEAKNESS: ICD-10-CM

## 2019-10-14 DIAGNOSIS — M25.632 STIFFNESS OF LEFT WRIST JOINT: ICD-10-CM

## 2019-10-14 PROCEDURE — 97110 THERAPEUTIC EXERCISES: CPT | Mod: PO

## 2019-10-14 PROCEDURE — 97035 APP MDLTY 1+ULTRASOUND EA 15: CPT | Mod: PO

## 2019-10-14 PROCEDURE — 97140 MANUAL THERAPY 1/> REGIONS: CPT | Mod: PO

## 2019-10-14 NOTE — PROGRESS NOTES
"  Occupational Therapy Daily Treatment Note     Date: 10/14/2019  Name: Sincere Riggins  Clinic Number: [id number]    Therapy Diagnosis:   Encounter Diagnoses   Name Primary?    Left arm pain     Stiffness of left wrist joint     Muscle weakness     Sensory disturbance      Physician: Vadim Harp Jr*    Physician Orders: Eval & Treat, post op care  Medical Diagnosis: L CTR & CuTR  Surgical Procedure and Date:  L CTR & CuTR, 06/07/19  Evaluation Date: 06/27/19  Insurance Authorization Period Expiration: 12/31/19      Visit # / Visits authorized: 28 / 30  Time In: 1:00 pm  Time Out:  2:00 pm  Total Billable Time: 60 minutes (P, 2MT, 2TE)    Precautions:  Standard    Subjective     Pt reports: "Over the weekend I had a lot of pain. It seems like the nerves are going crazy. Also when I have swelling my fingers hurt more, especially the pinky"  she was compliant with home exercise program given last session.   Response to previous treatment: decreased pain and increased AROM   Functional change: able to hold small light objects, able to make a loose composite fist    Pain:  1-2/10 pre-tx; 2/10 at night   Location: left elbow/wrist/hand      Objective     Sincere received the following supervised modalities after being cleared for contradictions for 10 minutes:   -Paraffin w/ MHP to L wrist/ hand with all digits in composite fist using Coban wrap, pre-tx to decrease pain & increase tissue extensibility     Sincere received manual therapy for 25 minutes, as follows:  -Edema mgmt w/ lymphatic drainage technique;  Deep STM, including MFR, CFM, TPR & scar mgmt to L elbow/forearm/wrist/hand, using hand techniques and IASTM tools to increase blood flow/circulation, improve soft tissue pliability and decrease pain.  -Joint mobilizations: L RF & SF PIP, al planes of mobility, grade 2-3, including distractions   -Kinesiotaping: Extension facilitation, P to D and single spiral wrap applied to to L RF & SF for edema and STM.    "   Sincere received therapeutic exercises for 25 minutes including:    AROM    -TGE's (wve, hook, straight & full fist) 5 reps each   -Isolated RF/SF PIP & DIP E/F 10 reps       Isospheres 3 min   9 Peg  3 trial, remove/replace    Octopus In-hand Manipulatiom 3 trials   Elbow-3 ways 10 reps, each way, 3# wt   Smiles & Frowns 2/10 reps each way, red T-bar   Wrist 3 ways over wedge  2/10 reps, 2# wt         T-Putty    -Molding 3 min, red   -Graspmanipulation 3 reps, red   -RF/SF Raking 3 trials, w/ yellow putty & therapist assist   -Log rolls with lateral pinch & ex splint 10 reps, red, w/ assist for IP extension   -Log rolls w/ tripod pinch 2 logs   -Cones 3 reps, yellow   -Dowel digs 3 min, yellow/green        Home Exercises and Education Provided     Education provided:   -Reviewed HEP     Written Home Exercises Provided: Patient instructed to cont prior HEP.  Exercises were reviewed and Sincere was able to demonstrate them prior to the end of the session.  Sincere demonstrated good  understanding of the HEP provided.     See EMR under Patient Instructions for exercises provided prior visit.      Assessment     Patient continues to demonstrate improvement in pain, edema and increased AROM of L hand/fingers.  Strength and coordination are also improving.  She continues to tolerate treatment well.  Recommend to continue focus on strengthening and coordination.  Patient's prognosis continues to be Good.     Pt will continue to benefit from skilled outpatient occupational therapy to address the deficits listed in the problem list on initial evaluation provide pt/family education and to maximize pt's level of independence in the home and community environment.     Anticipated barriers to occupational therapy: pain    Pt's spiritual, cultural and educational needs considered and pt agreeable to plan of care and goals.    Goals:  Short Term Goals: (in 2 weeks-07/10/19))  1) Patient will be independent in HEP-met  2) Decrease pain  in L elbow/wrist/hand to no more than 4-5/10 worst in ADL/IADL's-part met  3) Increase AROM in L wrist Extension by 10 degrees for improved functioning in ADL/IADL's-part met  4) Assess  & pinch strength-met  5) Decrease edema in L wrist by .2-.3 cm-met   6) Increase L IF-SF COSTA's by 10-15 degrees for increased functional use of hand in ADL/IADL's-met     Long Term Goals: (in 6 weeks-08/07/19)  1) Decrease pain in L elbow/wrist/hand to no more than 1-2/10 worst in ADL/IADL's   2) Increase AROM of L elbow/wrist/hand to WFL for increased functioning in ADL/IADL's  3) Increase strength in L  & pinch by 20% of initial measures for improved functioning in ADL/IADL's  4) Increased functioning in ADL/IADL's, as evidenced by a FOTO impairment rating of no more than 35%  5) Decrease edema in L elbow/wrist/hand to trace or none    Plan     Updated Certification Period: 8/8/2019 to 09/20/19    Recommended Treatment Plan: 2 times per week for 6 weeks: Electrical Stimulation IFC, Manual Therapy, Moist Heat/ Ice, Neuromuscular Re-ed, Orthotic Management and Training, Paraffin, Patient Education, Self Care, Therapeutic Activites, Therapeutic Exercise and Ultrasound  Other Recommendations: Patient may benefit from a custom L RF/SF orthosis to address developing Boutonierre deformity      Joseph Carlin, OT

## 2019-10-17 ENCOUNTER — CLINICAL SUPPORT (OUTPATIENT)
Dept: REHABILITATION | Facility: HOSPITAL | Age: 59
End: 2019-10-17
Payer: COMMERCIAL

## 2019-10-17 DIAGNOSIS — M79.602 LEFT ARM PAIN: ICD-10-CM

## 2019-10-17 DIAGNOSIS — M62.81 MUSCLE WEAKNESS: ICD-10-CM

## 2019-10-17 DIAGNOSIS — M25.632 STIFFNESS OF LEFT WRIST JOINT: ICD-10-CM

## 2019-10-17 DIAGNOSIS — R20.9 SENSORY DISTURBANCE: ICD-10-CM

## 2019-10-17 PROCEDURE — 97140 MANUAL THERAPY 1/> REGIONS: CPT | Mod: PO

## 2019-10-17 PROCEDURE — 97018 PARAFFIN BATH THERAPY: CPT | Mod: PO,59

## 2019-10-17 PROCEDURE — 97110 THERAPEUTIC EXERCISES: CPT | Mod: PO

## 2019-10-17 NOTE — PROGRESS NOTES
"  Occupational Therapy Daily Treatment Note     Date: 10/17/2019  Name: Sincere Riggins  Clinic Number: [id number]    Therapy Diagnosis:   Encounter Diagnoses   Name Primary?    Left arm pain     Stiffness of left wrist joint     Muscle weakness     Sensory disturbance      Physician: Vadim Harp Jr*    Physician Orders: Eval & Treat, post op care  Medical Diagnosis: L CTR & CuTR  Surgical Procedure and Date:  L CTR & CuTR, 06/07/19  Evaluation Date: 06/27/19  Insurance Authorization Period Expiration: 12/31/19      Visit # / Visits authorized: 29 / 30  Time In: 1:30 pm  Time Out:  2:15 pm  Total Billable Time: 45 minutes (P, 1MT, 1TE)    Precautions:  Standard    Subjective     Pt reports: "the pain is not too bad today. I can tell my RF & SF are getting straighter"  she was compliant with home exercise program given last session.   Response to previous treatment: decreased pain and increased AROM   Functional change: able to hold small light objects, able to make a loose composite fist    Pain:  1-2/10 pre-tx; 2/10 at night   Location: left elbow/wrist/hand      Objective     Sincere received the following supervised modalities after being cleared for contradictions for 10 minutes:   -Paraffin w/ MHP to L wrist/ hand with all digits in composite fist using Coban wrap, pre-tx to decrease pain & increase tissue extensibility     Sincere received manual therapy for 15 minutes, as follows:  -Edema mgmt w/ lymphatic drainage technique;  Deep STM, including MFR, CFM, TPR & scar mgmt to L elbow/forearm/wrist/hand, using hand techniques and IASTM tools to increase blood flow/circulation, improve soft tissue pliability and decrease pain.  -Joint mobilizations: L RF & SF PIP, al planes of mobility, grade 2-3, including distractions   -Kinesiotaping: Extension facilitation, P to D and single spiral wrap applied to to L RF & SF for edema and STM.      Sincere received therapeutic exercises for 20 minutes including: "    Isospheres 3 min   9 Peg  3 trial, remove/replace          T-Putty    -Molding 3 min, red   -Graspmanipulation 3 reps, red   -RF/SF Raking 3 trials, w/ yellow putty & therapist assist   -Log rolls with lateral pinch & ex splint 10 reps, red, w/ assist for IP extension   -Log rolls w/ tripod pinch 2 logs   -Cones 3 reps, yellow   -Dowel digs 3 min, yellow/green        Home Exercises and Education Provided     Education provided:   -Reviewed HEP     Written Home Exercises Provided: Patient instructed to cont prior HEP.  Exercises were reviewed and Sincere was able to demonstrate them prior to the end of the session.  Sincere demonstrated good  understanding of the HEP provided.     See EMR under Patient Instructions for exercises provided prior visit.      Assessment     Treatment shortened today due to patient arriving late for therapy.  Patient presents with increased L RF & SF MCP/PIP/DIP extension with each treatment.  She is tolerating treatment well.  Patient's prognosis continues to be Good.     Pt will continue to benefit from skilled outpatient occupational therapy to address the deficits listed in the problem list on initial evaluation provide pt/family education and to maximize pt's level of independence in the home and community environment.     Anticipated barriers to occupational therapy: pain    Pt's spiritual, cultural and educational needs considered and pt agreeable to plan of care and goals.    Goals:  Short Term Goals: (in 2 weeks-07/10/19))  1) Patient will be independent in HEP-met  2) Decrease pain in L elbow/wrist/hand to no more than 4-5/10 worst in ADL/IADL's-part met  3) Increase AROM in L wrist Extension by 10 degrees for improved functioning in ADL/IADL's-part met  4) Assess  & pinch strength-met  5) Decrease edema in L wrist by .2-.3 cm-met   6) Increase L IF-SF COSTA's by 10-15 degrees for increased functional use of hand in ADL/IADL's-met     Long Term Goals: (in 6 weeks-08/07/19)  1)  Decrease pain in L elbow/wrist/hand to no more than 1-2/10 worst in ADL/IADL's   2) Increase AROM of L elbow/wrist/hand to WFL for increased functioning in ADL/IADL's  3) Increase strength in L  & pinch by 20% of initial measures for improved functioning in ADL/IADL's  4) Increased functioning in ADL/IADL's, as evidenced by a FOTO impairment rating of no more than 35%  5) Decrease edema in L elbow/wrist/hand to trace or none    Plan     Updated Certification Period: 8/8/2019 to 09/20/19    Recommended Treatment Plan: 2 times per week for 6 weeks: Electrical Stimulation IFC, Manual Therapy, Moist Heat/ Ice, Neuromuscular Re-ed, Orthotic Management and Training, Paraffin, Patient Education, Self Care, Therapeutic Activites, Therapeutic Exercise and Ultrasound  Other Recommendations: Patient may benefit from a custom L RF/SF orthosis to address developing Boutonierre deformity      Joseph Carlin, OT

## 2019-10-23 ENCOUNTER — OFFICE VISIT (OUTPATIENT)
Dept: ORTHOPEDICS | Facility: CLINIC | Age: 59
End: 2019-10-23
Payer: COMMERCIAL

## 2019-10-23 VITALS
BODY MASS INDEX: 21.34 KG/M2 | WEIGHT: 125 LBS | SYSTOLIC BLOOD PRESSURE: 120 MMHG | DIASTOLIC BLOOD PRESSURE: 79 MMHG | HEART RATE: 76 BPM | HEIGHT: 64 IN

## 2019-10-23 DIAGNOSIS — M25.642 DECREASED RANGE OF MOTION OF FINGER OF LEFT HAND: ICD-10-CM

## 2019-10-23 DIAGNOSIS — R29.898 LEFT HAND WEAKNESS: Primary | ICD-10-CM

## 2019-10-23 DIAGNOSIS — G56.01 RIGHT CARPAL TUNNEL SYNDROME: ICD-10-CM

## 2019-10-23 PROCEDURE — 20526 PR INJECT CARPAL TUNNEL: ICD-10-PCS | Mod: RT,S$GLB,, | Performed by: PLASTIC SURGERY

## 2019-10-23 PROCEDURE — 99999 PR PBB SHADOW E&M-EST. PATIENT-LVL III: CPT | Mod: PBBFAC,,, | Performed by: PLASTIC SURGERY

## 2019-10-23 PROCEDURE — 3008F BODY MASS INDEX DOCD: CPT | Mod: CPTII,S$GLB,, | Performed by: PLASTIC SURGERY

## 2019-10-23 PROCEDURE — 3008F PR BODY MASS INDEX (BMI) DOCUMENTED: ICD-10-PCS | Mod: CPTII,S$GLB,, | Performed by: PLASTIC SURGERY

## 2019-10-23 PROCEDURE — 99213 PR OFFICE/OUTPT VISIT, EST, LEVL III, 20-29 MIN: ICD-10-PCS | Mod: 25,S$GLB,, | Performed by: PLASTIC SURGERY

## 2019-10-23 PROCEDURE — 99999 PR PBB SHADOW E&M-EST. PATIENT-LVL III: ICD-10-PCS | Mod: PBBFAC,,, | Performed by: PLASTIC SURGERY

## 2019-10-23 PROCEDURE — 99213 OFFICE O/P EST LOW 20 MIN: CPT | Mod: 25,S$GLB,, | Performed by: PLASTIC SURGERY

## 2019-10-23 PROCEDURE — 20526 THER INJECTION CARP TUNNEL: CPT | Mod: RT,S$GLB,, | Performed by: PLASTIC SURGERY

## 2019-10-23 RX ORDER — TRIAMCINOLONE ACETONIDE 40 MG/ML
40 INJECTION, SUSPENSION INTRA-ARTICULAR; INTRAMUSCULAR
Status: COMPLETED | OUTPATIENT
Start: 2019-10-23 | End: 2019-10-23

## 2019-10-23 RX ADMIN — TRIAMCINOLONE ACETONIDE 40 MG: 40 INJECTION, SUSPENSION INTRA-ARTICULAR; INTRAMUSCULAR at 12:10

## 2019-10-23 NOTE — PROGRESS NOTES
Subjective:      Patient ID: Sincere Riggins is a 59 y.o. female.    Chief Complaint: Pain of the Left Hand    Sincere Riggins is 4 months out from a left carpal tunnel release and left ulnar nerve transposition.  She has been doing therapy as prescribed she states that she has had some improvement strengthening.  She also has had development of stiffness in the PIP joints of the ring and small fingers of the left hand.  She feels that her Tinel's sign is advancing.  She is beginning to have more sensations within the left hand.  She still has left hand weakness. She also complains of increasing numbness and tingling throughout the right hand particularly at night.  She has no other complaints      Review of Systems   Musculoskeletal: Positive for stiffness. Negative for joint pain and joint swelling.   Neurological: Positive for numbness and paresthesias.   All other systems reviewed and are negative.        Objective:            General    Vitals reviewed.  Constitutional: She is oriented to person, place, and time. She appears well-nourished. No distress.   Pulmonary/Chest: Effort normal.   Neurological: She is alert and oriented to person, place, and time.   Psychiatric: She has a normal mood and affect.             Right Hand/Wrist Exam     Inspection   Effusion: Hand -  absent  Deformity: Hand -  deformity    Tests   Tinel's sign (median nerve): positive  Carpal Tunnel Compression Test: positive    Atrophy   Thenar:  negative  Hypothenar:  negative    Other     Neuorologic Exam    Median Distribution: normal  Ulnar Distribution: normal  Radial Distribution: normal      Left Hand/Wrist Exam     Range of Motion     Finger Flexion   PIP Ring: normal   PIP Little: normal   DIP Ring: normal   DIP Little: normal     Finger Extension   PIP Ring: abnormal  PIP Little: abnormal  DIP Ring: abnormal  DIP Little: abnormal    Tests     Atrophy  Thenar:  Negative  Hypothenar:  positive  Intrinsic: positive  1st Dorsal  Interosseous:  positive    Other     Sensory Exam  Median Distribution: normal  Ulnar Distribution: normal  Radial Distribution: abnormal        I have explained the risks, benefits, and alternatives of the procedure in detail.  The patient voices understanding and all questions have been answered.  The patient agrees to proceed as planned. After a sterile prep of the skin the right carpal tunnel is injected from the volar approach using a 25 gauge needle with a combination of 1cc 1% plain xylocaine and 40 mg of Kenalog.  The patient is cautioned and immediate relief of pain is secondary to the local anesthetic and will be temporary.  After the anesthetic wears off there may be a increase in pain that may last for a few hours or a few days and they should use ice to help alleviate this flair up of pain.               Assessment:       Encounter Diagnoses   Name Primary?    Left hand weakness Yes    Decreased range of motion of finger of left hand     Right carpal tunnel syndrome           Plan:       Sincere was seen today for pain.    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  This is a positive sign at the patient may begin to experience increased strength in the intrinsic muscles of the hand.  She was encouraged to continue with occupational therapy and to continue to work on extension of the PIP joints of the ring and small fingers of the left hand.  In addition she was found to have symptoms of carpal tunnel within the right hand.  Previous EMG demonstrated bilateral moderate carpal tunnel syndrome.  She has not ever received an injection in the  carpal tunnel therefore she was offered 1 today to help alleviate her symptoms in the meantime till her left hand can be rehabilitated.  The patient wished to pursue the injection today please see the procedure note above. I discussed that I would like to see her back in 3 months

## 2019-10-24 ENCOUNTER — CLINICAL SUPPORT (OUTPATIENT)
Dept: REHABILITATION | Facility: HOSPITAL | Age: 59
End: 2019-10-24
Payer: COMMERCIAL

## 2019-10-24 DIAGNOSIS — R20.9 SENSORY DISTURBANCE: ICD-10-CM

## 2019-10-24 DIAGNOSIS — M62.81 MUSCLE WEAKNESS: ICD-10-CM

## 2019-10-24 DIAGNOSIS — M25.632 STIFFNESS OF LEFT WRIST JOINT: ICD-10-CM

## 2019-10-24 DIAGNOSIS — M79.602 LEFT ARM PAIN: ICD-10-CM

## 2019-10-24 PROCEDURE — 97110 THERAPEUTIC EXERCISES: CPT | Mod: PO

## 2019-10-24 PROCEDURE — 97140 MANUAL THERAPY 1/> REGIONS: CPT | Mod: PO

## 2019-10-24 PROCEDURE — 97018 PARAFFIN BATH THERAPY: CPT | Mod: PO

## 2019-10-24 NOTE — PROGRESS NOTES
"  Occupational Therapy Daily Treatment Note     Date: 10/24/2019  Name: Sincere Riggins  Clinic Number: [id number]    Therapy Diagnosis:   Encounter Diagnoses   Name Primary?    Left arm pain     Stiffness of left wrist joint     Muscle weakness     Sensory disturbance      Physician: Vadim Harp Jr*    Physician Orders: Eval & Treat, post op care  Medical Diagnosis: L CTR & CuTR  Surgical Procedure and Date:  L CTR & CuTR, 06/07/19  Evaluation Date: 06/27/19  Insurance Authorization Period Expiration: 12/31/19      Visit # / Visits authorized: 29 / 30  Time In: 1:10 pm  Time Out:  2:10 pm  Total Billable Time: 60 minutes (P, 1MT, 2TE)    Precautions:  Standard    Subjective     Pt reports: "The doctor said he is concerned about the stiffness in my RF & SF. He wants me to continue with therapy"  she was compliant with home exercise program given last session.   Response to previous treatment: decreased pain and increased AROM   Functional change: able to hold small light objects, able to make a loose composite fist    Pain:  1-2/10 pre-tx; 2/10 at night   Location: left elbow/wrist/hand      Objective     Sincere received the following supervised modalities after being cleared for contradictions for 10 minutes:   -Paraffin w/ MHP to L wrist/ hand with all digits in composite fist using Coban wrap, pre-tx to decrease pain & increase tissue extensibility     Sincere received manual therapy for 20 minutes, as follows:  -Edema mgmt w/ lymphatic drainage technique;  Deep STM, including MFR, CFM, TPR & scar mgmt to L elbow/forearm/wrist/hand, using hand techniques and IASTM tools to increase blood flow/circulation, improve soft tissue pliability and decrease pain.  -Joint mobilizations: L RF & SF PIP, al planes of mobility, grade 2-3, including distractions   -      Sincere received therapeutic exercises for 30 minutes including:    PROM, LLLD stretch to RF & SF PIP in extension w/ MDM  10 reps, 15 sec hold each     "   Isospheres 3 min   9 Peg  3 trial, remove/replace          T-Putty    -Molding 3 min, red   -Graspmanipulation 3 reps, red   -RF/SF Raking 3 trials, w/ yellow putty & therapist assist   -Log rolls with lateral pinch & ex splint 10 reps, red, w/ assist for IP extension   -Log rolls w/ tripod pinch 2 logs   -Cones 3 reps, yellow   -Dowel digs 3 min, yellow/green        Home Exercises and Education Provided     Education provided:   -Reviewed HEP     Written Home Exercises Provided: Patient instructed to cont prior HEP.  Exercises were reviewed and Sincere was able to demonstrate them prior to the end of the session.  Sincere demonstrated good  understanding of the HEP provided.     See EMR under Patient Instructions for exercises provided prior visit.      Assessment     Patient continues to present with L RF & SF PIP-DIP flexion contracture.  By end of treatment patient noted to have increased L RF & SF MCP/PIP/DIP extension. She is tolerating treatment well.  Patient's prognosis continues to be Good.  Recommend re-assessment and Updated Plan of Care at next visit.     Pt will continue to benefit from skilled outpatient occupational therapy to address the deficits listed in the problem list on initial evaluation provide pt/family education and to maximize pt's level of independence in the home and community environment.     Anticipated barriers to occupational therapy: pain    Pt's spiritual, cultural and educational needs considered and pt agreeable to plan of care and goals.    Goals:  Short Term Goals: (in 2 weeks-07/10/19))  1) Patient will be independent in HEP-met  2) Decrease pain in L elbow/wrist/hand to no more than 4-5/10 worst in ADL/IADL's-part met  3) Increase AROM in L wrist Extension by 10 degrees for improved functioning in ADL/IADL's-part met  4) Assess  & pinch strength-met  5) Decrease edema in L wrist by .2-.3 cm-met   6) Increase L IF-SF COSTA's by 10-15 degrees for increased functional use of hand  in ADL/IADL's-met     Long Term Goals: (in 6 weeks-08/07/19)  1) Decrease pain in L elbow/wrist/hand to no more than 1-2/10 worst in ADL/IADL's   2) Increase AROM of L elbow/wrist/hand to WFL for increased functioning in ADL/IADL's  3) Increase strength in L  & pinch by 20% of initial measures for improved functioning in ADL/IADL's  4) Increased functioning in ADL/IADL's, as evidenced by a FOTO impairment rating of no more than 35%  5) Decrease edema in L elbow/wrist/hand to trace or none    Plan     Updated Certification Period: 8/8/2019 to 09/20/19    Recommended Treatment Plan: 2 times per week for 6 weeks: Electrical Stimulation IFC, Manual Therapy, Moist Heat/ Ice, Neuromuscular Re-ed, Orthotic Management and Training, Paraffin, Patient Education, Self Care, Therapeutic Activites, Therapeutic Exercise and Ultrasound  Other Recommendations: Patient may benefit from a custom L RF/SF orthosis to address developing Boutonierre deformity      Joseph Carlin, OT

## 2019-10-28 ENCOUNTER — CLINICAL SUPPORT (OUTPATIENT)
Dept: REHABILITATION | Facility: HOSPITAL | Age: 59
End: 2019-10-28
Payer: COMMERCIAL

## 2019-10-28 DIAGNOSIS — R20.9 SENSORY DISTURBANCE: ICD-10-CM

## 2019-10-28 DIAGNOSIS — M25.632 STIFFNESS OF LEFT WRIST JOINT: ICD-10-CM

## 2019-10-28 DIAGNOSIS — M62.81 MUSCLE WEAKNESS: ICD-10-CM

## 2019-10-28 DIAGNOSIS — M79.602 LEFT ARM PAIN: ICD-10-CM

## 2019-10-28 PROCEDURE — 97110 THERAPEUTIC EXERCISES: CPT | Mod: PO

## 2019-10-28 PROCEDURE — 97035 APP MDLTY 1+ULTRASOUND EA 15: CPT | Mod: PO

## 2019-10-28 PROCEDURE — 97018 PARAFFIN BATH THERAPY: CPT | Mod: PO

## 2019-10-28 PROCEDURE — 97140 MANUAL THERAPY 1/> REGIONS: CPT | Mod: PO

## 2019-10-28 NOTE — PROGRESS NOTES
Outpatient Therapy Updated Plan of Care     Visit Date: 10/28/2019  Name: Sincere Riggins  Clinic Number: [id number]    Therapy Diagnosis:   Encounter Diagnoses   Name Primary?    Left arm pain     Stiffness of left wrist joint     Muscle weakness     Sensory disturbance      Physician: Vadim Harp Jr*    Physician Orders: Eval & Treat, post op care  Medical Diagnosis: L CTR & CuTR  Surgical Procedure and Date:  L CTR & CuTR, 06/07/19  Evaluation Date: 06/27/19    Total Visits Received: 30  Cancelled Visits: 0  No Show Visits: 0    Current Certification Period:  09/20/19 to 11/14/19  Precautions:  Standard  Functional Level Prior to Evaluation:  Independent    Subjective     Update: Patient feels condition is improving and sensation is returning in her L forearm/wrist/hand and RF, however, she continues to have numbness & tingling in her L SF.  She reports she is wearing her splints as recommended    Objective     Update:   Observation/Appearance:  Joint stiffness in L RF & SF PIP in extension, with Joint capsular tightness.  Mild residual scar tissue present at surgical sites.  Patient initally has severe intrinsic atrophy in her L forearm-hand, but this is improving      Edema. Measured in centimeters.    6/27/2019 6/27/2019 10/28/19     Left Right Left   Elbow Crease 24.5 23.2 23.5 (-1.0)   Wrist Crease 15.6 15.0 15.0 (-.6)   Mid palm 18.7 19.8 18.0 (-.7)   MCPs 18.5 18.8 18.6 (+.1)      ROM:   06/27/19 10/28/19     Left  Left   Elbow E/F -20/149 -15/150   Forearm Sup/pron WFL/WFL WNL/WNL   Wrist E/F 56/65 62 (+6) /71 (+6)   Wrist RD/UD 21/25 20/35 (+10)   Thumb R/P Abd 42/40 50 (+8)/50 (+10)    Thumb MP flex 55 60 (+5)   Thumb IP flex 53 65 (+12)   Thumb [place] To base of SF To DPC         Hand ROM. Measured in degrees.    6/27/2019 10/28/19     Left Left          Index: MP  0/71 0/85              PIP     0/94 0/110              DIP 0/55 0/54              COSTA 220 249 (+29)          Long:  MP 0/84 0/90               PIP 0/96 0/105              DIP 0/33 0/50              COSTA 213 245 (+32)          Ring:   MP 0/80 0/95              PIP -25/87 -24/106              DIP 0/31 0/56              COSTA 173 233 (+60)          Small:  MP 0/71 0/85               PIP -35/75 -35/96               DIP 0/34 0/69              COSTA 145 215 (+70)      Sensation  Patient reports improved sensation in L wrist/hand and RF, but still has numbness and tingling in L SF to wrist      Strength (Dyanmometer) and Pinch Strength (Pinch Gauge)  Measured in pounds and psi.     10/28/19 10/28/19     Left Right   Rung II 20 45   Key Pinch 2 8   3pt Pinch 1 8   2pt Pinch .5 3     Assessment     Update: Patient has made good progress towards all goals set at initial evaluation, in ROM, strength and coordination of L Elbow/Forearm/Wrist/Hand, as patient has gained greater functioning in ADL/IADL's since initial evaluation.  Deficits in AROM, strength and coordination remain and patient has good potential for further improvement with continued OT services.     Goals:  Short Term Goals:   1) Patient will be independent in HEP-met  2) Decrease pain in L elbow/wrist/hand to no more than 4-5/10 worst in ADL/IADL's-met  3) Increase AROM in L wrist Extension by 10 degrees for improved functioning in ADL/IADL's-met  4) Assess  & pinch strength-met  5) Decrease edema in L wrist by .2-.3 cm-met   6) Increase L IF-SF COSTA's by 10-15 degrees for increased functional use of hand in ADL/IADL's-met     Long Term Goals:   1) Decrease pain in L elbow/wrist/hand to no more than 1-2/10 worst in ADL/IADL's-part met   2) Increase AROM of L elbow/wrist/hand to WFL for increased functioning in ADL/IADL's-part met  3) Increase strength in L  & pinch by 20% of initial measures for improved functioning in ADL/IADL's-ongoing  4) Increased functioning in ADL/IADL's, as evidenced by a FOTO impairment rating of no more than 35%-part met  5) Decrease edema in L  "elbow/wrist/hand to trace or none-part met    Previous Short Term Goals Status:   6/6 STG's met  New Short Term Goals Status:   Progress to LTG's   Long Term Goal Status:   continue per initial plan of care.  Reasons for Recertification of Therapy:   Updated Plan of Care needed    Plan     Updated Certification Period: 10/28/2019 to 11/14/19  Recommended Treatment Plan: 2 times per week for 8 weeks: Electrical Stimulation NMES/IFC, Manual Therapy, Moist Heat/ Ice, Neuromuscular Re-ed, Orthotic Management and Training, Paraffin, Patient Education, Therapeutic Activites, Therapeutic Exercise and Ultrasound  Other Recommendations: continued orthotic program for L RF & SF PIP extension    Joseph Carlin OT  10/28/2019      I CERTIFY THE NEED FOR THESE SERVICES FURNISHED UNDER THIS PLAN OF TREATMENT AND WHILE UNDER MY CARE    Physician's comments:        Physician's Signature: ___________________________________________________          Occupational Therapy Daily Treatment Note     Date: 10/28/2019  Name: Sincere Cast Riggins  Clinic Number: [id number]    Therapy Diagnosis:   Encounter Diagnoses   Name Primary?    Left arm pain     Stiffness of left wrist joint     Muscle weakness     Sensory disturbance      Physician: Vadim Harp Jr*    Visit # / Visits authorized: 30 / 30  Time In: 1:10 pm  Time Out:  2:10 pm  Total Billable Time: 60 minutes (P, 1MT, 2TE)    Precautions:  Standard    Subjective     Pt reports: "I had a lot of pain over the weekend, but it was mostly in the palm of my hand. Maybe the nerve is regenerating there"  she was compliant with home exercise program given last session.   Response to previous treatment: decreased pain and increased AROM   Functional change: able to hold small light objects, able to make a loose composite fist    Pain:  1-2/10 pre-tx; 2/10 at night   Location: left elbow/wrist/hand      Objective     Sincere received the following supervised modalities after being cleared for " contradictions for 10 minutes:   -Paraffin w/ MHP to L wrist/ hand with all digits in composite fist using Coban wrap, pre-tx to decrease pain & increase tissue extensibility     Sincere received manual therapy for 20 minutes, as follows:  -Edema mgmt w/ lymphatic drainage technique;  Deep STM, including MFR, CFM, TPR & scar mgmt to L elbow/forearm/wrist/hand, using hand techniques and IASTM tools to increase blood flow/circulation, improve soft tissue pliability and decrease pain.  -Joint mobilizations: L RF & SF PIP, al planes of mobility, grade 2-3, including distractions   -      Sincere received therapeutic exercises for 30 minutes including:    PROM, LLLD stretch to RF & SF PIP in extension w/ MDM  10 reps, 15 sec hold each       Isospheres 3 min   9 Peg  3 trial, remove/replace          T-Putty    -Molding 3 min, red   -Graspmanipulation 3 reps, red   -RF/SF Raking 3 trials, w/ yellow putty & therapist assist   -Log rolls with lateral pinch & ex splint 10 reps, red, w/ assist for IP extension   -Log rolls w/ tripod pinch 2 logs   -Cones 3 reps, yellow   -Dowel digs 3 min, yellow/green        Home Exercises and Education Provided     Education provided:   -Reviewed HEP     Written Home Exercises Provided: Patient instructed to cont prior HEP.  Exercises were reviewed and Sincere was able to demonstrate them prior to the end of the session.  Sincere demonstrated good  understanding of the HEP provided.     See EMR under Patient Instructions for exercises provided prior visit.      Assessment     See above assessment for details.     Pt will continue to benefit from skilled outpatient occupational therapy to address the deficits listed in the problem list on initial evaluation provide pt/family education and to maximize pt's level of independence in the home and community environment.     Anticipated barriers to occupational therapy: pain    Pt's spiritual, cultural and educational needs considered and pt agreeable to plan  of care and goals.    Plan     See above Updated Plan of Care for details     Joseph Carlin, OT

## 2019-10-31 ENCOUNTER — CLINICAL SUPPORT (OUTPATIENT)
Dept: REHABILITATION | Facility: HOSPITAL | Age: 59
End: 2019-10-31
Payer: COMMERCIAL

## 2019-10-31 DIAGNOSIS — M62.81 MUSCLE WEAKNESS: ICD-10-CM

## 2019-10-31 DIAGNOSIS — M25.632 STIFFNESS OF LEFT WRIST JOINT: ICD-10-CM

## 2019-10-31 DIAGNOSIS — R20.9 SENSORY DISTURBANCE: ICD-10-CM

## 2019-10-31 DIAGNOSIS — M79.602 LEFT ARM PAIN: ICD-10-CM

## 2019-10-31 PROCEDURE — 97018 PARAFFIN BATH THERAPY: CPT | Mod: PO

## 2019-10-31 PROCEDURE — 97140 MANUAL THERAPY 1/> REGIONS: CPT | Mod: PO

## 2019-10-31 PROCEDURE — 97110 THERAPEUTIC EXERCISES: CPT | Mod: PO

## 2019-10-31 NOTE — PROGRESS NOTES
"    Occupational Therapy Daily Treatment Note     Date: 10/31/2019  Name: Sincere Riggins  Clinic Number: [id number]    Therapy Diagnosis:   Encounter Diagnoses   Name Primary?    Left arm pain     Stiffness of left wrist joint     Muscle weakness     Sensory disturbance      Physician: Vadim Harp Jr*    Physician Orders: Eval & Treat, post op care  Medical Diagnosis: L CTR & CuTR  Surgical Procedure and Date:  L CTR & CuTR, 06/07/19  Evaluation Date: 06/27/19  Insurance Authorization Period Expiration: 12/31/19    Visit # / Visits authorized: 31  Time In: 1:15 pm  Time Out:  2:05 pm  Total Billable Time: 50 minutes (P, 1MT, 1TE)    Precautions:  Standard    Subjective     Pt reports: "The pain is not as bad at night anymore. Every now & then the pain is a little more, but normally it stays around 1-2/10"  she was compliant with home exercise program given last session.   Response to previous treatment: decreased pain and increased AROM   Functional change: able to hold small light objects, able to make a loose composite fist    Pain:  1-2/10 pre-tx; 2/10 at night   Location: left elbow/wrist/hand      Objective     Sincere received the following supervised modalities after being cleared for contradictions for 10 minutes:   -Paraffin w/ MHP to L wrist/ hand with all digits in composite fist using Coban wrap, pre-tx to decrease pain & increase tissue extensibility     Sincere received manual therapy for 15 minutes, as follows:  -Edema mgmt w/ lymphatic drainage technique;  Deep STM, including MFR, CFM, TPR & scar mgmt to L elbow/forearm/wrist/hand, using hand techniques and IASTM tools to increase blood flow/circulation, improve soft tissue pliability and decrease pain.  -Joint mobilizations: L RF & SF PIP, al planes of mobility, grade 2-3, including distractions      Sincere received therapeutic exercises for 30 minutes including:    PROM, LLLD stretch to RF & SF PIP in extension w/ MDM  10 reps, 15 sec hold each "       Isospheres 3 min   Elbow-3 ways 10 reps, each way, 3# wt   Smiles & Frowns 2/15 reps each way, red T-bar         T-Putty    -Molding 3 min, red   -Graspmanipulation 3 reps, red   -Cones 3 reps, yellow   -Dowel digs 3 min, yellow/green        Home Exercises and Education Provided     Education provided:   -Reviewed HEP     Written Home Exercises Provided: Patient instructed to cont prior HEP.  Exercises were reviewed and Sincere was able to demonstrate them prior to the end of the session.  Sincere demonstrated good  understanding of the HEP provided.     See EMR under Patient Instructions for exercises provided prior visit.      Assessment     Patient's pain remains low.  She presents with greater PROM in L RF & SF PIP for extension, but AROM remains about he same.  She continues to have weakness, but is making steady progress towards improvement in overall strength of L elbow/wrist/hand    Pt will continue to benefit from skilled outpatient occupational therapy to address the deficits listed in the problem list on initial evaluation provide pt/family education and to maximize pt's level of independence in the home and community environment.     Anticipated barriers to occupational therapy: pain    Pt's spiritual, cultural and educational needs considered and pt agreeable to plan of care and goals.    Goals:  Short Term Goals:   1) Patient will be independent in HEP-met  2) Decrease pain in L elbow/wrist/hand to no more than 4-5/10 worst in ADL/IADL's-met  3) Increase AROM in L wrist Extension by 10 degrees for improved functioning in ADL/IADL's-met  4) Assess  & pinch strength-met  5) Decrease edema in L wrist by .2-.3 cm-met   6) Increase L IF-SF COSTA's by 10-15 degrees for increased functional use of hand in ADL/IADL's-met     Long Term Goals:   1) Decrease pain in L elbow/wrist/hand to no more than 1-2/10 worst in ADL/IADL's-part met   2) Increase AROM of L elbow/wrist/hand to WFL for increased functioning in  ADL/IADL's-part met  3) Increase strength in L  & pinch by 20% of initial measures for improved functioning in ADL/IADL's-ongoing  4) Increased functioning in ADL/IADL's, as evidenced by a FOTO impairment rating of no more than 35%-part met  5) Decrease edema in L elbow/wrist/hand to trace or none-part met      Plan     Updated Certification Period: 10/31/2019 to 11/14/19    Recommended Treatment Plan: 2 times per week for 8 weeks: Electrical Stimulation NMES/IFC, Manual Therapy, Moist Heat/ Ice, Neuromuscular Re-ed, Orthotic Management and Training, Paraffin, Patient Education, Therapeutic Activites, Therapeutic Exercise and Ultrasound  Other Recommendations: continued orthotic program for L RF & SF PIP extension    Joseph Carlin, OT

## 2019-11-04 ENCOUNTER — CLINICAL SUPPORT (OUTPATIENT)
Dept: REHABILITATION | Facility: HOSPITAL | Age: 59
End: 2019-11-04
Payer: COMMERCIAL

## 2019-11-04 DIAGNOSIS — M79.602 LEFT ARM PAIN: ICD-10-CM

## 2019-11-04 DIAGNOSIS — M25.632 STIFFNESS OF LEFT WRIST JOINT: ICD-10-CM

## 2019-11-04 DIAGNOSIS — R20.9 SENSORY DISTURBANCE: ICD-10-CM

## 2019-11-04 DIAGNOSIS — M62.81 MUSCLE WEAKNESS: ICD-10-CM

## 2019-11-04 PROCEDURE — 97110 THERAPEUTIC EXERCISES: CPT | Mod: PO

## 2019-11-04 PROCEDURE — 97140 MANUAL THERAPY 1/> REGIONS: CPT | Mod: PO

## 2019-11-04 PROCEDURE — 97018 PARAFFIN BATH THERAPY: CPT | Mod: PO

## 2019-11-04 NOTE — PROGRESS NOTES
"    Occupational Therapy Daily Treatment Note     Date: 11/4/2019  Name: Sincere Riggins  Clinic Number: [id number]    Therapy Diagnosis:   Encounter Diagnoses   Name Primary?    Left arm pain     Stiffness of left wrist joint     Muscle weakness     Sensory disturbance      Physician: Vadim Harp Jr*    Physician Orders: Eval & Treat, post op care  Medical Diagnosis: L CTR & CuTR  Surgical Procedure and Date:  L CTR & CuTR, 06/07/19  Evaluation Date: 06/27/19  Insurance Authorization Period Expiration: 12/31/19    Visit # / Visits authorized: 32  Time In: 1:05 pm  Time Out:  2:05 pm  Total Billable Time: 60 minutes (P, 1MT, 2TE)    Precautions:  Standard    Subjective     Pt reports: "The pain is not as bad at night anymore. Every now & then the pain is a little more, but normally it stays around 1-2/10"  she was compliant with home exercise program given last session.   Response to previous treatment: decreased pain and increased AROM   Functional change: able to hold small light objects, able to make a loose composite fist    Pain:  1-2/10 pre-tx; 2/10 at night   Location: left elbow/wrist/hand      Objective     Sincere received the following supervised modalities after being cleared for contradictions for 10 minutes:   -Paraffin w/ MHP to L wrist/ hand with all digits in composite fist using Coban wrap, pre-tx to decrease pain & increase tissue extensibility     Sincere received manual therapy for 15 minutes, as follows:  -Edema mgmt w/ lymphatic drainage technique;  Deep STM, including MFR, CFM, TPR & scar mgmt to L elbow/forearm/wrist/hand, using hand techniques and IASTM tools to increase blood flow/circulation, improve soft tissue pliability and decrease pain.  -Joint mobilizations: L RF & SF PIP, al planes of mobility, grade 2-3, including distractions      Sincere received therapeutic exercises for 35 minutes including:    PROM, LLLD stretch to RF & SF PIP in extension w/ MDM  10 reps, 15 sec hold each "       Isospheres 3 min   Elbow-3 ways 10 reps, each way, 3# wt   Smiles & Frowns 2/15 reps each way, red T-bar         T-Putty    -Molding 3 min, red   -Graspmanipulation 3 reps, red   -Cones 3 reps, yellow   -Dowel digs 3 min, yellow/green        Home Exercises and Education Provided     Education provided:   -Added to HEP, see attached for details     Written Home Exercises Provided: Patient instructed to cont prior HEP.  Exercises were reviewed and Sincere was able to demonstrate them prior to the end of the session.  Sincere demonstrated good  understanding of the HEP provided.     See EMR under Patient Instructions for exercises provided prior visit.      Assessment     Patient reports a new onset of numbness & tingling over the lateral aspect of her proximal L forearm that began over the weekend.  Patient is noted to have a significant amount of myofascial tightness in her L forearm, mostly radial-volar aspect, but this improved by end of treatment today.  She was provided with new HEP, see attached for details to address this tightness, in hopes to eliminate the new onset of tingling.      Pt will continue to benefit from skilled outpatient occupational therapy to address the deficits listed in the problem list on initial evaluation provide pt/family education and to maximize pt's level of independence in the home and community environment.     Anticipated barriers to occupational therapy: pain    Pt's spiritual, cultural and educational needs considered and pt agreeable to plan of care and goals.    Goals:  Short Term Goals:   1) Patient will be independent in HEP-met  2) Decrease pain in L elbow/wrist/hand to no more than 4-5/10 worst in ADL/IADL's-met  3) Increase AROM in L wrist Extension by 10 degrees for improved functioning in ADL/IADL's-met  4) Assess  & pinch strength-met  5) Decrease edema in L wrist by .2-.3 cm-met   6) Increase L IF-SF COSTA's by 10-15 degrees for increased functional use of hand in  ADL/IADL's-met     Long Term Goals:   1) Decrease pain in L elbow/wrist/hand to no more than 1-2/10 worst in ADL/IADL's-part met   2) Increase AROM of L elbow/wrist/hand to WFL for increased functioning in ADL/IADL's-part met  3) Increase strength in L  & pinch by 20% of initial measures for improved functioning in ADL/IADL's-ongoing  4) Increased functioning in ADL/IADL's, as evidenced by a FOTO impairment rating of no more than 35%-part met  5) Decrease edema in L elbow/wrist/hand to trace or none-part met      Plan     Updated Certification Period: 10/31/2019 to 11/14/19    Recommended Treatment Plan: 2 times per week for 8 weeks: Electrical Stimulation NMES/IFC, Manual Therapy, Moist Heat/ Ice, Neuromuscular Re-ed, Orthotic Management and Training, Paraffin, Patient Education, Therapeutic Activites, Therapeutic Exercise and Ultrasound  Other Recommendations: continued orthotic program for L RF & SF PIP extension    Joseph Carlin, OT

## 2019-11-07 ENCOUNTER — CLINICAL SUPPORT (OUTPATIENT)
Dept: REHABILITATION | Facility: HOSPITAL | Age: 59
End: 2019-11-07
Payer: COMMERCIAL

## 2019-11-07 DIAGNOSIS — R20.9 SENSORY DISTURBANCE: ICD-10-CM

## 2019-11-07 DIAGNOSIS — M79.602 LEFT ARM PAIN: ICD-10-CM

## 2019-11-07 DIAGNOSIS — M62.81 MUSCLE WEAKNESS: ICD-10-CM

## 2019-11-07 DIAGNOSIS — M25.632 STIFFNESS OF LEFT WRIST JOINT: ICD-10-CM

## 2019-11-07 PROCEDURE — 97110 THERAPEUTIC EXERCISES: CPT | Mod: PO

## 2019-11-07 PROCEDURE — 97018 PARAFFIN BATH THERAPY: CPT | Mod: PO,59

## 2019-11-07 PROCEDURE — 97140 MANUAL THERAPY 1/> REGIONS: CPT | Mod: PO

## 2019-11-07 NOTE — PROGRESS NOTES
"    Occupational Therapy Daily Treatment Note     Date: 11/7/2019  Name: Sincere Riggins  Clinic Number: [id number]    Therapy Diagnosis:   Encounter Diagnoses   Name Primary?    Left arm pain     Stiffness of left wrist joint     Muscle weakness     Sensory disturbance      Physician: Vadim Harp Jr*    Physician Orders: Eval & Treat, post op care  Medical Diagnosis: L CTR & CuTR  Surgical Procedure and Date:  L CTR & CuTR, 06/07/19  Evaluation Date: 06/27/19  Insurance Authorization Period Expiration: 12/31/19    Visit # / Visits authorized: [id number]  Time In: 1:10 pm  Time Out:  2:00 pm  Total Billable Time: 50 minutes (P, 1MT, 1TE)    Precautions:  Standard    Subjective     Pt reports: "The numbness in my forearm is a little better. I've been doing the stretches like you told me to do"  she was compliant with home exercise program given last session.   Response to previous treatment: decreased pain and increased AROM   Functional change: able to hold small light objects, able to make a loose composite fist    Pain:  1-2/10 pre-tx;  Location: left elbow/wrist/hand      Objective     Sincere received the following supervised modalities after being cleared for contradictions for 10 minutes:   -Paraffin w/ MHP to L wrist/ hand with all digits in composite fist using Coban wrap, pre-tx to decrease pain & increase tissue extensibility     Sincere received manual therapy for 15 minutes, as follows:  -Edema mgmt w/ lymphatic drainage technique;  Deep STM, including MFR, CFM, TPR & scar mgmt to L elbow/forearm/wrist/hand, using hand techniques and IASTM tools to increase blood flow/circulation, improve soft tissue pliability and decrease pain.  -Joint mobilizations: L RF & SF PIP, al planes of mobility, grade 2-3, including distractions      Sincere received therapeutic exercises for 25 minutes including:    PROM, LLLD stretch to RF & SF PIP in extension w/ MDM  10 reps, 15 sec hold each       Isospheres 3 min   9 Peg  3 " trial, remove/replace    Elbow-3 ways 10 reps, each way, 3# wt   Smiles & Frowns 2/15 reps each way, red T-bar         T-Putty    -Molding 3 min, red   -Graspmanipulation 3 reps, red   -Log rolls w/ tripod pinch 2 logs   -Cones 3 reps, yellow   -Dowel digs 3 min, yellow/green        Home Exercises and Education Provided     Education provided:   -Added to HEP, see attached for details     Written Home Exercises Provided: Patient instructed to cont prior HEP.  Exercises were reviewed and Sincere was able to demonstrate them prior to the end of the session.  Sincere demonstrated good  understanding of the HEP provided.     See EMR under Patient Instructions for exercises provided prior visit.      Assessment     Patient with an improvement in numbness & tingling over the lateral aspect of her proximal L forearm since last visit, however, symptoms haven't completely resolved. Recommend to continue to monitor these symptoms.   Patient is presenting with improvement in L RF & SF PIP-DIP extension with each treatment.  She is tolerating treatment well.       Pt will continue to benefit from skilled outpatient occupational therapy to address the deficits listed in the problem list on initial evaluation provide pt/family education and to maximize pt's level of independence in the home and community environment.     Anticipated barriers to occupational therapy: pain    Pt's spiritual, cultural and educational needs considered and pt agreeable to plan of care and goals.    Goals:  Short Term Goals:   1) Patient will be independent in HEP-met  2) Decrease pain in L elbow/wrist/hand to no more than 4-5/10 worst in ADL/IADL's-met  3) Increase AROM in L wrist Extension by 10 degrees for improved functioning in ADL/IADL's-met  4) Assess  & pinch strength-met  5) Decrease edema in L wrist by .2-.3 cm-met   6) Increase L IF-SF COSTA's by 10-15 degrees for increased functional use of hand in ADL/IADL's-met     Long Term Goals:   1)  Decrease pain in L elbow/wrist/hand to no more than 1-2/10 worst in ADL/IADL's-part met   2) Increase AROM of L elbow/wrist/hand to WFL for increased functioning in ADL/IADL's-part met  3) Increase strength in L  & pinch by 20% of initial measures for improved functioning in ADL/IADL's-ongoing  4) Increased functioning in ADL/IADL's, as evidenced by a FOTO impairment rating of no more than 35%-part met  5) Decrease edema in L elbow/wrist/hand to trace or none-part met      Plan     Updated Certification Period: 10/31/2019 to 11/14/19    Recommended Treatment Plan: 2 times per week for 8 weeks: Electrical Stimulation NMES/IFC, Manual Therapy, Moist Heat/ Ice, Neuromuscular Re-ed, Orthotic Management and Training, Paraffin, Patient Education, Therapeutic Activites, Therapeutic Exercise and Ultrasound  Other Recommendations: continued orthotic program for L RF & SF PIP extension    Joseph Carlin, OT

## 2019-11-14 ENCOUNTER — CLINICAL SUPPORT (OUTPATIENT)
Dept: REHABILITATION | Facility: HOSPITAL | Age: 59
End: 2019-11-14
Payer: COMMERCIAL

## 2019-11-14 DIAGNOSIS — R20.9 SENSORY DISTURBANCE: ICD-10-CM

## 2019-11-14 DIAGNOSIS — M62.81 MUSCLE WEAKNESS: ICD-10-CM

## 2019-11-14 DIAGNOSIS — M25.632 STIFFNESS OF LEFT WRIST JOINT: ICD-10-CM

## 2019-11-14 DIAGNOSIS — M79.602 LEFT ARM PAIN: ICD-10-CM

## 2019-11-14 PROCEDURE — 97140 MANUAL THERAPY 1/> REGIONS: CPT | Mod: PO

## 2019-11-14 PROCEDURE — 97018 PARAFFIN BATH THERAPY: CPT | Mod: PO

## 2019-11-14 PROCEDURE — 97110 THERAPEUTIC EXERCISES: CPT | Mod: PO

## 2019-11-14 NOTE — PROGRESS NOTES
"    Occupational Therapy Daily Treatment Note     Date: 11/14/2019  Name: Sincere Riggins  Clinic Number: [id number]    Therapy Diagnosis:   Encounter Diagnoses   Name Primary?    Left arm pain     Stiffness of left wrist joint     Muscle weakness     Sensory disturbance      Physician: Vadim Harp Jr*    Physician Orders: Eval & Treat, post op care  Medical Diagnosis: L CTR & CuTR  Surgical Procedure and Date:  L CTR & CuTR, 06/07/19  Evaluation Date: 06/27/19  Insurance Authorization Period Expiration: 12/31/19    Visit # / Visits authorized: 34  Time In: 1:05 pm  Time Out:  2:00 pm  Total Billable Time: 55 minutes (P, 2MT, 1TE)    Precautions:  Standard    Subjective     Pt reports: "I think the forearm tingling has gone away. I've been doing the stretches and it is helping". Patient also states "My right hand, is having some numbness and tingling in the RF & SF, but I'm wearing a splint at night"  she was compliant with home exercise program given last session.   Response to previous treatment: decreased pain and increased AROM   Functional change: able to hold small light objects, able to make a loose composite fist    Pain:  1-2/10 pre-tx;  Location: left elbow/wrist/hand      Objective     Sincere received the following supervised modalities after being cleared for contradictions for 10 minutes:   -Paraffin w/ MHP to L wrist/ hand with all digits in composite fist using Coban wrap, pre-tx to decrease pain & increase tissue extensibility     Sincere received manual therapy for 30 minutes, as follows:  -Edema mgmt w/ lymphatic drainage technique;  Deep STM, including MFR, CFM, TPR & scar mgmt to L elbow/forearm/wrist/hand, using hand techniques and IASTM tools to increase blood flow/circulation, improve soft tissue pliability and decrease pain. Focus was placed on L RF, SF PIP & Thumb MP, dorsal-volar aspects for soft tissue release from scar.   -Joint mobilizations: L RF & SF PIP, al planes of mobility, " grade 2-3, including distractions   -Kinesiotaping: small I strip for thumb extension facilitation, P to D, from MC to DIP at max stretch; single spiral wrap applied to L thumb for edema and scar tissue management.      Sincere received therapeutic exercises for 15 minutes including:    PROM, LLLD stretch to RF & SF PIP in extension w/ MDM  10 reps, 15 sec hold each   Ulnar nerve glides-2 ways 3 reps         T-Putty    -Molding 3 min, red   -Graspmanipulation 3 reps, red     Orthotics check:  velcro replaced on night splint for L FR & SF     Home Exercises and Education Provided     Education provided:   -Added to HEP, see attached for details     Written Home Exercises Provided: Patient instructed to cont prior HEP.  Exercises were reviewed and Sincere was able to demonstrate them prior to the end of the session.  Sincere demonstrated good  understanding of the HEP provided.     See EMR under Patient Instructions for exercises provided prior visit.      Assessment     Patient continues to have capsular tightness in L RF/SF PIP & thumb MP, but is slowly improving.  Patient is steadily gaining greater ROM, strength and coordination of L hand, however, she continues to have significant intrinsic muscle atrophy in the ulnar nerve distribution.  Symptoms in her L forearm (numbness/tingling) seemed to have resolved.  Patient is presenting with improvement in L RF & SF PIP-DIP extension with each treatment.  She is tolerating treatment well.       Pt will continue to benefit from skilled outpatient occupational therapy to address the deficits listed in the problem list on initial evaluation provide pt/family education and to maximize pt's level of independence in the home and community environment.     Anticipated barriers to occupational therapy: pain    Pt's spiritual, cultural and educational needs considered and pt agreeable to plan of care and goals.    Goals:  Short Term Goals:   1) Patient will be independent in HEP-met  2)  Decrease pain in L elbow/wrist/hand to no more than 4-5/10 worst in ADL/IADL's-met  3) Increase AROM in L wrist Extension by 10 degrees for improved functioning in ADL/IADL's-met  4) Assess  & pinch strength-met  5) Decrease edema in L wrist by .2-.3 cm-met   6) Increase L IF-SF COSTA's by 10-15 degrees for increased functional use of hand in ADL/IADL's-met     Long Term Goals:   1) Decrease pain in L elbow/wrist/hand to no more than 1-2/10 worst in ADL/IADL's-part met   2) Increase AROM of L elbow/wrist/hand to WFL for increased functioning in ADL/IADL's-part met  3) Increase strength in L  & pinch by 20% of initial measures for improved functioning in ADL/IADL's-ongoing  4) Increased functioning in ADL/IADL's, as evidenced by a FOTO impairment rating of no more than 35%-part met  5) Decrease edema in L elbow/wrist/hand to trace or none-part met      Plan     Updated Certification Period: 10/31/2019 to 11/14/19    Recommended Treatment Plan: 2 times per week for 8 weeks: Electrical Stimulation NMES/IFC, Manual Therapy, Moist Heat/ Ice, Neuromuscular Re-ed, Orthotic Management and Training, Paraffin, Patient Education, Therapeutic Activites, Therapeutic Exercise and Ultrasound  Other Recommendations: continued orthotic program for L RF & SF PIP extension    Joseph Carlin, OT

## 2019-11-19 ENCOUNTER — OFFICE VISIT (OUTPATIENT)
Dept: SPINE | Facility: CLINIC | Age: 59
End: 2019-11-19
Payer: COMMERCIAL

## 2019-11-19 VITALS
DIASTOLIC BLOOD PRESSURE: 64 MMHG | WEIGHT: 125 LBS | HEIGHT: 64 IN | BODY MASS INDEX: 21.34 KG/M2 | SYSTOLIC BLOOD PRESSURE: 130 MMHG | HEART RATE: 72 BPM

## 2019-11-19 DIAGNOSIS — R20.0 NUMBNESS OF RIGHT FOOT: Primary | ICD-10-CM

## 2019-11-19 DIAGNOSIS — R20.0 NUMBNESS OF LEFT FOOT: ICD-10-CM

## 2019-11-19 PROCEDURE — 99214 OFFICE O/P EST MOD 30 MIN: CPT | Mod: S$GLB,,, | Performed by: PHYSICIAN ASSISTANT

## 2019-11-19 PROCEDURE — 99999 PR PBB SHADOW E&M-EST. PATIENT-LVL IV: CPT | Mod: PBBFAC,,, | Performed by: PHYSICIAN ASSISTANT

## 2019-11-19 PROCEDURE — 99214 PR OFFICE/OUTPT VISIT, EST, LEVL IV, 30-39 MIN: ICD-10-PCS | Mod: S$GLB,,, | Performed by: PHYSICIAN ASSISTANT

## 2019-11-19 PROCEDURE — 3008F PR BODY MASS INDEX (BMI) DOCUMENTED: ICD-10-PCS | Mod: CPTII,S$GLB,, | Performed by: PHYSICIAN ASSISTANT

## 2019-11-19 PROCEDURE — 99999 PR PBB SHADOW E&M-EST. PATIENT-LVL IV: ICD-10-PCS | Mod: PBBFAC,,, | Performed by: PHYSICIAN ASSISTANT

## 2019-11-19 PROCEDURE — 3008F BODY MASS INDEX DOCD: CPT | Mod: CPTII,S$GLB,, | Performed by: PHYSICIAN ASSISTANT

## 2019-11-19 NOTE — PROGRESS NOTES
"Subjective:     Patient ID:  Sincere Riggins is a 59 y.o. female.    [place]    Chief Complaint: Bilateral feet numbness    HPI    Sincere Riggins is a 59 y.o. female who presents for follow up.  The patient is a new patient to me today and is a patient [facility].    She has seen malini in the past for her hand numbness.  She had surgery on her left ulnar and carpal tunnel nerve with Dr. Harp.    She has had bilateral feet numbness for the past 20 years which she takes 900-1200mg/day of gabapentin.  About one week ago she started to have more numbness in both feet and a very heavy feeling in both feet more so on the right side.  This is different then her normal feet numbness.    She notices this more when she walks.    No back pain.  No leg pain or paresthesias.    Patient denies any recent accidents or trauma, no saddle anesthesias, and no bowel or bladder incontinence.      Review of Systems:  Constitution: Negative for chills, fever, night sweats and weight loss.   Musculoskeletal: Negative for falls.   Gastrointestinal: Negative for bowel incontinence, nausea and vomiting.   Genitourinary: Negative for bladder incontinence.   Neurological: Negative for disturbances in coordination and loss of balance.       Objective:      Vitals:    11/19/19 1718   BP: 130/64   Pulse: 72   Weight: 56.7 kg (125 lb)   Height: 5' 4" (1.626 m)   PainSc: 0-No pain         Physical Exam:    General:  Sincere Riggins is well-developed, well-nourished, appears stated age, in no acute distress, alert and oriented to person, place, and time.    Pulmonary/Chest:  Respiratory effort normal  Abdominal: Exhibits no distension  Psychiatric:  Normal mood and affect.  Behavior is normal.  Judgement and thought content normal    Musculoskeletal:    Patient arises from a sitting to standing position without difficulty.  Patient walks to the door without evidence of limp, pain, or abnormality of gait. " Patient is able to walk on heels and toes without difficulty.    Lumbar ROM:   No pain in lumbar flexion, extension, right lateral bending, and left lateral bending.    Lumbar Spine Inspection:  Normal with no surgical scars with no visible rashes.    Lumbar Spine Palpation:  No tenderness to low back palpation.    SI Joint Palpation:  No tenderness to SI Joint palpation.    Straight Leg Raise:  Negative right and left SLR.    Neurological:  Alert and oriented to person, place, and time    Muscle strength against resistance:     Right Left   Hip flexion  5 / 5 5 / 5   Hip extension 5 / 5 5 / 5   Hip abduction 5 / 5 5 / 5   Hip adduction  5 / 5 5 / 5   Knee extension  5 / 5 5 / 5   Knee flexion 5 / 5 5 / 5   Dorsiflexion  5 / 5 5 / 5   EHL  5 / 5 5 / 5   Plantar flexion  5 / 5 5 / 5   Inversion of the feet 5 / 5 5 / 5   Eversion of the feet  5 / 5 5 / 5     Reflexes:     Right Left   Patellar 1+ 2+   Achilles 2+ 2+   2+ bilateral UE  Negative marcelo bilaterally  Negative babinski bilaterally  Clonus:  Negative bilaterally  Cannot feel light touch or pressure on the bottom of the right foot, slightly on the left.  Numbness and tingling bilaterally medial and upper feet to light touch  More numbness to light touch on the lateral right foot compared to the left foot    On gross examination of the bilateral upper extremities, patient has full painfree ROM with no signs of clubbing, cyanosis, edema, or weakness.     No imaging to review    Assessment:          1. Numbness of right foot    2. Numbness of left foot            Plan:          Orders Placed This Encounter    MRI Lumbar Spine Without Contrast    MRI Lumbar Spine Without Contrast    EMG W/ ULTRASOUND AND NERVE CONDUCTION TEST 2 Extremities     Bilateral feet numbness    -MRI lumbar spine  -EMG/NCS of the bilateral LE to see if feet numbness/heaviness from the lumbar spine or peripheral neuropathy  -Will send results of both through [facility]      Follow-Up:   Follow up if symptoms worsen or fail to improve. If there are any questions prior to this, the patient was instructed to contact the office.       CHARLOTTE Hernandez PA-C  Neurosurgery  [facility]  [facility]    Addendum:  11/20/19    External MRI lumbar spine ordered.    She will need to fu in clinic with the MRI and the report after that and the EMG is done.    CHARLOTTE Hernandez PA-C  Neurosurgery  [facility]  [facility]

## 2019-11-20 ENCOUNTER — TELEPHONE (OUTPATIENT)
Dept: SPINE | Facility: CLINIC | Age: 59
End: 2019-11-20

## 2019-11-20 NOTE — TELEPHONE ENCOUNTER
External MRI lumbar spine ordered.    She will need to fu in clinic with the MRI on a cd and the report after that and the EMG is done.    Thanks,  Kymberly Ascencio, CHARLOTTE, PA-C  Neurosurgery  [facility]  [facility]

## 2019-11-20 NOTE — TELEPHONE ENCOUNTER
----- Message from Lucille Mar MA sent at 11/20/2019 10:34 AM CST -----  Contact: [facility]   Can you change orders to external    ----- Message -----  From: Carolyn Day  Sent: 11/20/2019  10:26 AM CST  To: [facility] Staff    Name of Who is Calling: [facility]     What is the request in detail:Pt would like to change location on her MRI she would it done at [facility] needs orders fax #[phone]...... Please contact to further discuss and advise      Can the clinic reply by [facility]: No     What [facility] Call Back [facility]:  n/A

## 2019-11-22 ENCOUNTER — TELEPHONE (OUTPATIENT)
Dept: SPINE | Facility: CLINIC | Age: 59
End: 2019-11-22

## 2019-11-22 NOTE — TELEPHONE ENCOUNTER
----- Message from Ena Haney sent at 11/22/2019  9:33 AM CST -----  Contact: [facility]  Name of Who is Calling: Judy Cobb    What is the request in detail: is needing the order authorization number. Judy with [facility] states pt is scheduled for Monday. Please contact to further discuss and advise      Can the clinic reply by [facility]: no    What Number to Call Back if not [facility]: office [phone]  or  fax# [phone]

## 2019-12-02 ENCOUNTER — TELEPHONE (OUTPATIENT)
Dept: SPINE | Facility: CLINIC | Age: 59
End: 2019-12-02

## 2019-12-02 NOTE — TELEPHONE ENCOUNTER
Make her a fu appointment with me once the EMG and MRI is done and tell her to bring the CD and the report to her FU appointment.    Kymberly Ascencio, CHARLOTTE, PA-C  Neurosurgery  [facility]  [facility]

## 2019-12-03 ENCOUNTER — PROCEDURE VISIT (OUTPATIENT)
Dept: PHYSICAL MEDICINE AND REHAB | Facility: CLINIC | Age: 59
End: 2019-12-03
Payer: COMMERCIAL

## 2019-12-03 DIAGNOSIS — R20.0 NUMBNESS OF LEFT FOOT: ICD-10-CM

## 2019-12-03 DIAGNOSIS — R20.0 NUMBNESS OF RIGHT FOOT: ICD-10-CM

## 2019-12-03 PROCEDURE — 95886 MUSC TEST DONE W/N TEST COMP: CPT | Mod: S$GLB,,, | Performed by: PHYSICAL MEDICINE & REHABILITATION

## 2019-12-03 PROCEDURE — 95910 NRV CNDJ TEST 7-8 STUDIES: CPT | Mod: S$GLB,,, | Performed by: PHYSICAL MEDICINE & REHABILITATION

## 2019-12-03 PROCEDURE — 95910 PR NERVE CONDUCTION STUDY; 7-8 STUDIES: ICD-10-PCS | Mod: S$GLB,,, | Performed by: PHYSICAL MEDICINE & REHABILITATION

## 2019-12-03 PROCEDURE — 95886 PR EMG COMPLETE, W/ NERVE CONDUCTION STUDIES, 5+ MUSCLES: ICD-10-PCS | Mod: S$GLB,,, | Performed by: PHYSICAL MEDICINE & REHABILITATION

## 2019-12-03 NOTE — PROCEDURES
Test Date:  12/3/2019    Patient: Sincere Riggins : 1960 Physician: Fredrick Joyce D.O.   ID#:  SEX: Female Ref. Phys: Kymberly Ascencio, *     HPI: Sincere Riggins is a 59 y.o.female who presents for NCS/EMG of the bilateral lower extremities to evaluate for bilateral foot pain/numbness/feeling like walking on marbles.  This began at the tips of the toes and worked its way up both legs to the ankles.  The symptoms are symmetric.      NCV & EMG Findings:   Evaluation of the left Fibular (EDB) motor, the right Fibular (EDB) motor, and the right Tibial (AHB) motor nerves showed reduced amplitude.   The left Tibial (AHB) motor nerve showed prolonged distal onset latency and reduced amplitude.   The left Superficial Fibular sensory, the left sural sensory, and the right sural sensory nerves showed no response.   Needle evaluation of the right Tibialis Anterior, the left Gastrocnemius, and the left First Dorsal Interosseous (pedis) muscles showed increased motor unit duration and slightly increased polyphasic potentials.   The right Gastrocnemius muscle showed increased insertional activity and diminished recruitment.   The left Extensor Digitorum Brevis (pedis), the right Extensor Digitorum Brevis (pedis), and the right First Dorsal Interosseous (pedis) muscles showed increased motor unit duration, slightly increased polyphasic potentials, and diminished recruitment.   All remaining muscles (as indicated in the following table) showed no evidence of electrical instability.    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  This is relatively symmetrical, and appears chronic based on MUAP morphology.  Previous peripheral neuropathy workup was negative in 2018.  I recommend referral to Neurology to further evaluate (has seen Dr. Ruth in the past).       ___________________________  Fredrick Joyce D.O.        NCS+  Motor Nerve Results      Latency Amplitude F-Lat Segment Distance CV Comment   Site (ms) Norm (mV) Norm (ms)  (cm) (m/s) Norm    Left Fibular (EDB)   Ankle 6.3  < 6.5 *0.57  > 1.10         Bel Fib Head 16.8 - 0.63 -  Bel Fib Head-Ankle - -  > 39    Right Fibular (EDB)   Ankle 3.9  < 6.5 *0.85  > 1.10         Bel Fib Head 12.7 - 0.51 -  Bel Fib Head-Ankle - -  > 39    Left Tibial (AHB)   Ankle *7.2  < 6.1 *1.26  > 5.3         Right Tibial (AHB)   Ankle 5.9  < 6.1 *0.57  > 5.3           Sensory Nerve Results      Latency (Peak) Amplitude (P-P) Segment Distance CV Comment   Site (ms) Norm (µV) Norm  (cm) (m/s) Norm    Left Sural   Calf-Lat Mall *NR  < 4.5 *NR  > 4 Calf-Lat Mall 14 *NR  > 35    Right Sural   Calf-Lat Mall *NR  < 4.5 *NR  > 4 Calf-Lat Mall 14 *NR  > 35    Left Superficial Fibular   14 cm-Ankle *NR  < 4.2 *NR  > 5 14 cm-Ankle 14 *NR  > 32      EMG+     Side Muscle Nerve Root Ins Act Fibs Psw Amp Dur Poly Recrt Int Pat Comment   Right Vastus Med Femoral L2-L4 Nml Nml Nml Nml Nml 0 Nml Nml    Right Tib Anterior Fibular,  Deep Fibula... L4-L5 Nml Nml Nml Nml *>12ms *1+ Nml Nml    Right Fib Longus Fibular,  Superficial... L5-S1 Nml Nml Nml Nml Nml 0 Nml Nml    Right Gastroc Tibial S1-S2 *Incr Nml Nml Nml Nml 0 *Reduced Nml myotnic discharge   Left Vastus Med Femoral L2-L4 Nml Nml Nml Nml Nml 0 Nml Nml    Left Tib Anterior Fibular,  Deep Fibula... L4-L5 Nml Nml Nml Nml Nml 0 Nml Nml    Left Fib Longus Fibular,  Superficial... L5-S1 Nml Nml Nml Nml Nml 0 Nml Nml    Left Gastroc Tibial S1-S2 Nml Nml Nml Nml *>12ms *1+ Nml Nml    Left EDB Fibular,  Deep Fibula... L5-S1 Nml Nml Nml Nml *>12ms *1+ *Reduced Nml    Left FDI Pedis Lateral Plantar,  Elsie... S1-S2 Nml Nml Nml Nml *>12ms *1+ Nml Nml    Right EDB Fibular,  Deep Fibula... L5-S1 Nml Nml Nml Nml *>12ms *1+ *Reduced Nml    Right FDI Pedis Lateral Plantar,  Elsie... S1-S2 Nml Nml Nml Nml  *>12ms *1+ *Reduced Nml    Right Lumbo Parasp (Mid) Rami L3-L4 Nml Nml Nml         Right Lumbo Parasp (Lower) Rami L5-S1 Nml Nml Nml         Left Lumbo Parasp (Mid) Rami L3-L4 Nml Nml Nml         Left Lumbo Parasp (Lower) Rami L5-S1 Nml Nml Nml                 Waveforms:    Motor              Sensory

## 2019-12-04 ENCOUNTER — CLINICAL SUPPORT (OUTPATIENT)
Dept: REHABILITATION | Facility: HOSPITAL | Age: 59
End: 2019-12-04
Payer: COMMERCIAL

## 2019-12-04 DIAGNOSIS — M62.81 MUSCLE WEAKNESS: ICD-10-CM

## 2019-12-04 DIAGNOSIS — M25.632 STIFFNESS OF LEFT WRIST JOINT: ICD-10-CM

## 2019-12-04 DIAGNOSIS — M79.602 LEFT ARM PAIN: ICD-10-CM

## 2019-12-04 DIAGNOSIS — R20.9 SENSORY DISTURBANCE: ICD-10-CM

## 2019-12-04 PROCEDURE — 97110 THERAPEUTIC EXERCISES: CPT | Mod: PO

## 2019-12-04 PROCEDURE — 97140 MANUAL THERAPY 1/> REGIONS: CPT | Mod: PO

## 2019-12-04 PROCEDURE — 97018 PARAFFIN BATH THERAPY: CPT | Mod: PO,59

## 2019-12-04 NOTE — PROGRESS NOTES
"    Occupational Therapy Daily Treatment Note     Date: 12/4/2019  Name: Sincere Riggins  Clinic Number: [id number]    Therapy Diagnosis:   Encounter Diagnoses   Name Primary?    Left arm pain     Stiffness of left wrist joint     Muscle weakness     Sensory disturbance      Physician: Vadim Harp Jr*    Physician Orders: Eval & Treat, post op care  Medical Diagnosis: L CTR & CuTR  Surgical Procedure and Date:  L CTR & CuTR, 06/07/19  Evaluation Date: 06/27/19  Insurance Authorization Period Expiration: 12/31/19    Visit # / Visits authorized: 35  Time In: 8:05 am  Time Out:  9:00 am  Total Billable Time: 55 minutes (P, 1MT, 2TE)    Precautions:  Standard    Subjective     Pt reports: "I've been doing my exercises and I think it's getting stronger"  she was compliant with home exercise program given last session.   Response to previous treatment: decreased pain and increased AROM   Functional change: able to hold small light objects, able to make a loose composite fist    Pain:  1/10 pre-tx;  Location: left elbow/wrist/hand      Objective     Sincere received the following supervised modalities after being cleared for contradictions for 10 minutes:   -Paraffin w/ MHP to L wrist/ hand with all digits in composite fist using Coban wrap, pre-tx to decrease pain & increase tissue extensibility     Sincere received manual therapy for 20 minutes, as follows:  -Edema mgmt w/ lymphatic drainage technique;  Deep STM, including MFR, CFM, TPR & scar mgmt to L elbow/forearm/wrist/hand, using hand techniques and IASTM tools to increase blood flow/circulation, improve soft tissue pliability and decrease pain. Focus was placed on L RF, SF PIP & Thumb MP, dorsal-volar aspects for soft tissue release from scar.   -Joint mobilizations: L RF & SF PIP, al planes of mobility, grade 2-3, including distractions       Sincere received therapeutic exercises for 25 minutes including:    PROM, LLLD stretch to RF & SF PIP in extension w/ MDM  " 5 reps, 15 sec hold each   Gripper 3/10 reps, setting 3    T-Putty    -Molding 3 min, red   -Graspmanipulation 3 reps, red   -Pancakes & Extension blooms 3 reps, red   -RF/SF Raking 3 trials, orange w/ assist   -Log rolls w/ tripod pinch 2 logs, red   -Cones 3 reps, orange   -Dowel digs 3 min, red     Patient was provided with a supply of red T-putty to progress strengthening program    Home Exercises and Education Provided     Education provided:   -Added to HEP, see attached for details     Written Home Exercises Provided: Patient instructed to cont prior HEP.  Exercises were reviewed and Sincere was able to demonstrate them prior to the end of the session.  Sincere demonstrated good  understanding of the HEP provided.     See EMR under Patient Instructions for exercises provided prior visit.      Assessment     Patient presents with less capsular tightness in L RF/SF PIP's and increased extension.  Patient is steadily gaining strength, however, she still presents with intrinsic and extrinsic strengtht.  She is tolerating treatment well.       Pt will continue to benefit from skilled outpatient occupational therapy to address the deficits listed in the problem list on initial evaluation provide pt/family education and to maximize pt's level of independence in the home and community environment.     Anticipated barriers to occupational therapy: pain    Pt's spiritual, cultural and educational needs considered and pt agreeable to plan of care and goals.    Goals:  Short Term Goals:   1) Patient will be independent in HEP-met  2) Decrease pain in L elbow/wrist/hand to no more than 4-5/10 worst in ADL/IADL's-met  3) Increase AROM in L wrist Extension by 10 degrees for improved functioning in ADL/IADL's-met  4) Assess  & pinch strength-met  5) Decrease edema in L wrist by .2-.3 cm-met   6) Increase L IF-SF COSTA's by 10-15 degrees for increased functional use of hand in ADL/IADL's-met     Long Term Goals:   1) Decrease  pain in L elbow/wrist/hand to no more than 1-2/10 worst in ADL/IADL's-part met   2) Increase AROM of L elbow/wrist/hand to WFL for increased functioning in ADL/IADL's-part met  3) Increase strength in L  & pinch by 20% of initial measures for improved functioning in ADL/IADL's-ongoing  4) Increased functioning in ADL/IADL's, as evidenced by a FOTO impairment rating of no more than 35%-part met  5) Decrease edema in L elbow/wrist/hand to trace or none-part met      Plan     Updated Certification Period: 10/31/2019 to 11/14/19    Recommended Treatment Plan: 2 times per week for 8 weeks: Electrical Stimulation NMES/IFC, Manual Therapy, Moist Heat/ Ice, Neuromuscular Re-ed, Orthotic Management and Training, Paraffin, Patient Education, Therapeutic Activites, Therapeutic Exercise and Ultrasound  Other Recommendations: continued orthotic program for L RF & SF PIP extension    Joseph Carlin, OT

## 2019-12-06 ENCOUNTER — OFFICE VISIT (OUTPATIENT)
Dept: SPINE | Facility: CLINIC | Age: 59
End: 2019-12-06
Payer: COMMERCIAL

## 2019-12-06 VITALS
DIASTOLIC BLOOD PRESSURE: 60 MMHG | HEART RATE: 82 BPM | BODY MASS INDEX: 21.34 KG/M2 | HEIGHT: 64 IN | WEIGHT: 125 LBS | SYSTOLIC BLOOD PRESSURE: 120 MMHG

## 2019-12-06 DIAGNOSIS — G57.93 NEUROPATHIC PAIN OF BOTH FEET: Primary | ICD-10-CM

## 2019-12-06 DIAGNOSIS — G62.9 PERIPHERAL POLYNEUROPATHY: ICD-10-CM

## 2019-12-06 PROCEDURE — 99999 PR PBB SHADOW E&M-EST. PATIENT-LVL IV: CPT | Mod: PBBFAC,,, | Performed by: PHYSICIAN ASSISTANT

## 2019-12-06 PROCEDURE — 99214 PR OFFICE/OUTPT VISIT, EST, LEVL IV, 30-39 MIN: ICD-10-PCS | Mod: S$GLB,,, | Performed by: PHYSICIAN ASSISTANT

## 2019-12-06 PROCEDURE — 99999 PR PBB SHADOW E&M-EST. PATIENT-LVL IV: ICD-10-PCS | Mod: PBBFAC,,, | Performed by: PHYSICIAN ASSISTANT

## 2019-12-06 PROCEDURE — 3008F PR BODY MASS INDEX (BMI) DOCUMENTED: ICD-10-PCS | Mod: CPTII,S$GLB,, | Performed by: PHYSICIAN ASSISTANT

## 2019-12-06 PROCEDURE — 99214 OFFICE O/P EST MOD 30 MIN: CPT | Mod: S$GLB,,, | Performed by: PHYSICIAN ASSISTANT

## 2019-12-06 PROCEDURE — 3008F BODY MASS INDEX DOCD: CPT | Mod: CPTII,S$GLB,, | Performed by: PHYSICIAN ASSISTANT

## 2019-12-06 NOTE — PROGRESS NOTES
"Subjective:     Patient ID:  Sincere Riggins is a 59 y.o. female.    [place]    Chief Complaint: Bilateral feet pain and numbness    HPI    Sincere Riggins is a 59 y.o. female who presents for MRI follow up and EMG/NCS fu.    She has had bilateral feet numbness for the past 20 years which she takes 900-1200mg/day of gabapentin.  A few weeks ago she started to have more numbness in both feet and a very heavy feeling in both feet more so on the right side.  This is different then her normal feet numbness.     She notices this more when she walks.     No back pain.  No leg pain or paresthesias.    She increased the Neurontin to 1800mg a day which has not helped much.     Patient denies any recent accidents or trauma, no saddle anesthesias, and no bowel or bladder incontinence.      Review of Systems:  Constitution: Negative for chills, fever, night sweats and weight loss.   Musculoskeletal: Negative for falls.   Gastrointestinal: Negative for bowel incontinence, nausea and vomiting.   Genitourinary: Negative for bladder incontinence.   Neurological: Negative for disturbances in coordination and loss of balance.      Objective:      Vitals:    12/06/19 1307   BP: 120/60   Pulse: 82   Weight: 56.7 kg (125 lb)   Height: 5' 4" (1.626 m)   PainSc:   8   PainLoc: Foot         Physical Exam:    General:  Sincere Riggins is well-developed, well-nourished, appears stated age, in no acute distress, alert and oriented to person, place, and time.    Patient sits comfortably in the exam room and answers questions appropriately. Grossly patient is able to move bilateral lower extremities without difficulty.     MRI Interpretation:     Lumbar spine MRI was personally reviewed today on a CD from 11/25/2019 which was returned to the patient.  Mild disc bulging at L4-5.  No central stenosis or NFS.    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    Assessment:          1. Neuropathic pain of both feet    2. " Peripheral polyneuropathy            Plan:          Orders Placed This Encounter    Ambulatory referral to Neurology       Feet symptoms not from the spine  Will refer to Neurology for further workup and treatment  FU PRN      Follow-Up:  Follow up if symptoms worsen or fail to improve. If there are any questions prior to this, the patient was instructed to contact the office.       CHARLOTTE Hernandez, PA-C  Neurosurgery  [facility]  [facility]

## 2019-12-12 ENCOUNTER — CLINICAL SUPPORT (OUTPATIENT)
Dept: REHABILITATION | Facility: HOSPITAL | Age: 59
End: 2019-12-12
Payer: COMMERCIAL

## 2019-12-12 DIAGNOSIS — R20.9 SENSORY DISTURBANCE: ICD-10-CM

## 2019-12-12 DIAGNOSIS — M62.81 MUSCLE WEAKNESS: ICD-10-CM

## 2019-12-12 DIAGNOSIS — M25.632 STIFFNESS OF LEFT WRIST JOINT: ICD-10-CM

## 2019-12-12 DIAGNOSIS — M79.602 LEFT ARM PAIN: ICD-10-CM

## 2019-12-12 PROCEDURE — 97018 PARAFFIN BATH THERAPY: CPT | Mod: PO

## 2019-12-12 PROCEDURE — 97140 MANUAL THERAPY 1/> REGIONS: CPT | Mod: PO

## 2019-12-12 PROCEDURE — 97110 THERAPEUTIC EXERCISES: CPT | Mod: PO

## 2019-12-12 NOTE — PROGRESS NOTES
Occupational Therapy Daily Treatment Note     Date: 12/12/2019  Name: Sincere Riggins  Clinic Number: [id number]    Therapy Diagnosis:   Encounter Diagnoses   Name Primary?    Left arm pain     Stiffness of left wrist joint     Muscle weakness     Sensory disturbance      Physician: Vadim Harp Jr*    Physician Orders: Eval & Treat, post op care  Medical Diagnosis: L CTR & CuTR  Surgical Procedure and Date:  L CTR & CuTR, 06/07/19  Evaluation Date: 06/27/19  Insurance Authorization Period Expiration: 12/31/19    Visit # / Visits authorized: 36  Update Plan of Care needed at next visit  Time In: 8:05 am  Time Out:  9:00 am  Total Billable Time: 55 minutes (P, 1MT, 2TE)    Precautions:  Standard    Subjective     Pt reports: she continues to perform her HEP as recommended, including orthotics program  she was compliant with home exercise program given last session.   Response to previous treatment: decreased pain and increased AROM   Functional change: able to hold small light objects, able to make a loose composite fist    Pain:  0-1/10 pre-tx;  Location: left elbow/wrist/hand      Objective     Sincere received the following supervised modalities after being cleared for contradictions for 10 minutes:   -Paraffin w/ MHP to L wrist/ hand with all digits in composite fist using Coban wrap, pre-tx to decrease pain & increase tissue extensibility     Sincere received manual therapy for 15 minutes, as follows:  -Edema mgmt w/ lymphatic drainage technique;  Deep STM, including MFR, CFM, TPR & scar mgmt to L elbow/forearm/wrist/hand, using hand techniques and IASTM tools to increase blood flow/circulation, improve soft tissue pliability and decrease pain. Focus was placed on L RF, SF PIP & Thumb MP, dorsal-volar aspects for soft tissue release from scar.   -Joint mobilizations: L RF & SF PIP, al planes of mobility, grade 2-3, including distractions       Sincere received therapeutic exercises for 30 minutes including:     PROM, LLLD stretch to RF & SF PIP in extension w/ MDM  5 reps, 15 sec hold each   Gripper 3/10 reps, setting 3    Pom-pom  1 trial, red CP           T-Putty    -Molding 3 min, red   -Graspmanipulation 3 reps, red   -Pancakes & Extension blooms 3 reps, red   -RF/SF Raking 3 trials, orange w/ assist   -Log rolls w/ tripod pinch 2 logs, red   -Cones 3 reps, orange   -Dowel digs 3 min, red       Home Exercises and Education Provided     Education provided:   -Reviewed HEP, see Patient Instructions for details     Written Home Exercises Provided: Patient instructed to cont prior HEP.  Exercises were reviewed and Sincere was able to demonstrate them prior to the end of the session.  Sincere demonstrated good  understanding of the HEP provided.     See EMR under Patient Instructions for exercises provided prior visit.      Assessment     Patient noted to have some james aponeurosis tightness and increased clawing of L LF/RF/SF today at onset of treatment, but this did improve by end of treatment.  She has less capsular tightness in L RF/SF PIP's and she continues to have increased finger extension.  Patient continues to make gains in strength, however, she still presents with intrinsic and extrinsic strengtht.  She is tolerating treatment well.       Pt will continue to benefit from skilled outpatient occupational therapy to address the deficits listed in the problem list on initial evaluation provide pt/family education and to maximize pt's level of independence in the home and community environment.     Anticipated barriers to occupational therapy: pain    Pt's spiritual, cultural and educational needs considered and pt agreeable to plan of care and goals.    Goals:  Short Term Goals:   1) Patient will be independent in HEP-met  2) Decrease pain in L elbow/wrist/hand to no more than 4-5/10 worst in ADL/IADL's-met  3) Increase AROM in L wrist Extension by 10 degrees for improved functioning in ADL/IADL's-met  4)  Assess  & pinch strength-met  5) Decrease edema in L wrist by .2-.3 cm-met   6) Increase L IF-SF COSTA's by 10-15 degrees for increased functional use of hand in ADL/IADL's-met     Long Term Goals:   1) Decrease pain in L elbow/wrist/hand to no more than 1-2/10 worst in ADL/IADL's-part met   2) Increase AROM of L elbow/wrist/hand to WFL for increased functioning in ADL/IADL's-part met  3) Increase strength in L  & pinch by 20% of initial measures for improved functioning in ADL/IADL's-ongoing  4) Increased functioning in ADL/IADL's, as evidenced by a FOTO impairment rating of no more than 35%-part met  5) Decrease edema in L elbow/wrist/hand to trace or none-part met      Plan     Updated Certification Period: 10/31/2019 to 11/14/19    Recommended Treatment Plan: 2 times per week for 8 weeks: Electrical Stimulation NMES/IFC, Manual Therapy, Moist Heat/ Ice, Neuromuscular Re-ed, Orthotic Management and Training, Paraffin, Patient Education, Therapeutic Activites, Therapeutic Exercise and Ultrasound  Other Recommendations: continued orthotic program for L RF & SF PIP extension    Joseph Carlin, OT

## 2019-12-13 ENCOUNTER — PATIENT MESSAGE (OUTPATIENT)
Dept: NEUROLOGY | Facility: CLINIC | Age: 59
End: 2019-12-13

## 2019-12-19 ENCOUNTER — CLINICAL SUPPORT (OUTPATIENT)
Dept: REHABILITATION | Facility: HOSPITAL | Age: 59
End: 2019-12-19
Payer: COMMERCIAL

## 2019-12-19 DIAGNOSIS — M25.632 STIFFNESS OF LEFT WRIST JOINT: ICD-10-CM

## 2019-12-19 DIAGNOSIS — M62.81 MUSCLE WEAKNESS: ICD-10-CM

## 2019-12-19 DIAGNOSIS — M79.602 LEFT ARM PAIN: ICD-10-CM

## 2019-12-19 DIAGNOSIS — R20.9 SENSORY DISTURBANCE: ICD-10-CM

## 2019-12-19 PROCEDURE — 97110 THERAPEUTIC EXERCISES: CPT | Mod: PO

## 2019-12-19 PROCEDURE — 97140 MANUAL THERAPY 1/> REGIONS: CPT | Mod: PO

## 2019-12-19 PROCEDURE — 97018 PARAFFIN BATH THERAPY: CPT | Mod: PO

## 2019-12-19 NOTE — PROGRESS NOTES
Outpatient Therapy Updated Plan of Care     Visit Date: 12/19/2019  Name: Sincere Riggins  Clinic Number: [id number]    Therapy Diagnosis:   Encounter Diagnoses   Name Primary?    Left arm pain     Stiffness of left wrist joint     Muscle weakness     Sensory disturbance      Physician: Vadim Harp Jr*    Physician Orders: Eval & Treat, post op care  Medical Diagnosis: L CTR & CuTR  Surgical Procedure and Date:  L CTR & CuTR, 06/07/19  Evaluation Date: 06/27/19    Total Visits Received: 30  Cancelled Visits: 0  No Show Visits: 0    Current Certification Period:  09/20/19 to 11/14/19  Precautions:  Standard  Functional Level Prior to Evaluation:  Independent    Subjective     Update: Patient continues to report she is having numbness & tingling in her L SF, but area affected is decreasing in size.  She continues to report good compliance with HEP, including orthotics program.      Objective     Update:   Observation/Appearance:  minimal joint stiffness in L RF & SF PIP in extension, with minimal PIP joint capsular tightness.  Trace scar tissue present at surgical sites.  Patient continues with moderate to significant intrinsic muscle atrophy in her L forearm-hand, but this continues to improve      Edema. Measured in centimeters.    6/27/2019 6/27/2019 10/28/19 12/19/19     Left Right Left Left   Elbow Crease 24.5 23.2 23.5 (-1.0) 24.0   Wrist Crease 15.6 15.0 15.0 (-.6) 14.9   Mid palm 18.7 19.8 18.0 (-.7) 17.0   MCPs 18.5 18.8 18.6 (+.1) 18.0      ROM:   06/27/19 10/28/19 12/19/19     Left  Left Left   Elbow E/F -20/149 -15/150 -10/150   Forearm Sup/pron WFL/WFL WNL/WNL WNL/WNL   Wrist E/F 56/65 62 (+6) /71 (+6) 67/75   Wrist RD/UD 21/25 20/35 (+10) 20/39         Hand ROM. Measured in degrees.    6/27/2019 10/28/19 12/19/19     Left Left Left           Index: MP  0/71 0/85 0/86              PIP     0/94 0/110 0/108              DIP 0/55 0/54 0/63              COSTA 220 249 (+29) 257 (+8)           Long:   MP 0/84 0/90 0/92              PIP 0/96 0/105 0/108              DIP 0/33 0/50 0/57              COSTA 213 245 (+32) 257 (+12)           Ring:   MP 0/80 0/95 0/97              PIP -25/87 -24/106 -20/106              DIP 0/31 0/56 0/51              COSTA 173 233 (+60) 234 (+1)           Small:  MP 0/71 0/85 0/88               PIP -35/75 -35/96 -26/104               DIP 0/34 0/69 0/62              COSTA 145 215 (+70) 228 (+13)      Sensation  Patient reports improved sensation in L wrist/hand and RF, but still has numbness and tingling in L mid palm to SF tip      Strength (Dyanmometer) and Pinch Strength (Pinch Gauge)  Measured in pounds and psi.     10/28/19 10/28/19 12/19/19     Left Right Left   Rung II 20 45 23 (+3)   Garg Pinch 2 8 2 (=)   3pt Pinch 1 8 1 (=)   2pt Pinch .5 3 1 (+.5)     Assessment     Update: Patient continues to make good progress towards all goals set including increased ROM, strength and coordination of L Elbow/Forearm/Wrist/Hand. Patient continues to have functional improvements in ADL/IADL's, but continues to have deficits due to remaining strength deficits.  Nerve pain continues to persist, but fluctuates daily.  Deficits in strength and coordination remain and patient would benefit from continued therapy to maximize functioning to highest level.  She continues to have good potential for further improvement with continued OT services.     Goals:  Short Term Goals:   1) Patient will be independent in HEP-met  2) Decrease pain in L elbow/wrist/hand to no more than 4-5/10 worst in ADL/IADL's-met  3) Increase AROM in L wrist Extension by 10 degrees for improved functioning in ADL/IADL's-met  4) Assess  & pinch strength-met  5) Decrease edema in L wrist by .2-.3 cm-met   6) Increase L IF-SF COSTA's by 10-15 degrees for increased functional use of hand in ADL/IADL's-met     Long Term Goals:   1) Decrease pain in L elbow/wrist/hand to no more than 1-2/10 worst in ADL/IADL's-part met   2) Increase  AROM of L elbow/wrist/hand to WFL for increased functioning in ADL/IADL's-met  3) Increase strength in L  & pinch by 20% of initial measures for improved functioning in ADL/IADL's-ongoing  4) Increased functioning in ADL/IADL's, as evidenced by a FOTO impairment rating of no more than 35%-part met  5) Decrease edema in L elbow/wrist/hand to trace or none-met    Previous Short Term Goals Status:   6/6 STG's met; STG's=LTG's  New Short Term Goals Status:   Continue with unmet LTG's   Long Term Goal Status:   continue per initial plan of care.  Reasons for Recertification of Therapy:   Updated Plan of Care needed    Plan     Updated Certification Period: 11/14/19 to 01/14/20  Recommended Treatment Plan: 1-2 times per week for 8 weeks: Electrical Stimulation NMES/IFC, Manual Therapy, Moist Heat/ Ice, Neuromuscular Re-ed, Orthotic Management and Training, Paraffin, Patient Education, Therapeutic Activites, Therapeutic Exercise and Ultrasound  Other Recommendations: continued orthotic program for L RF & SF PIP extension    Joseph Carlin OT  12/19/2019      I CERTIFY THE NEED FOR THESE SERVICES FURNISHED UNDER THIS PLAN OF TREATMENT AND WHILE UNDER MY CARE    Physician's comments:        Physician's Signature: ___________________________________________________          Occupational Therapy Daily Treatment Note     Date: 12/19/2019  Name: Sincere Cast Riggins  Clinic Number: [id number]    Therapy Diagnosis:   Encounter Diagnoses   Name Primary?    Left arm pain     Stiffness of left wrist joint     Muscle weakness     Sensory disturbance      Physician: Vadim Harp Jr*    Visit # / Visits authorized: 37   Time In: 1:10 pm  Time Out:  2:05 pm  Total Billable Time: 55 minutes (P, 1MT, 2TE)    Precautions:  Standard    Subjective     Pt reports: she continues to have nerve pain at times   she was compliant with home exercise program given last session.   Response to previous treatment: decreased pain and increased  AROM   Functional change: able to hold small light objects, able to make a loose composite fist    Pain:  1-2/10 pre-tx; 2/10 at night   Location: left elbow/wrist/hand      Objective     Sincere received the following supervised modalities after being cleared for contradictions for 10 minutes:   -Paraffin w/ MHP to L wrist/ hand with all digits in composite fist using Coban wrap, pre-tx to decrease pain & increase tissue extensibility     Sincere received manual therapy for 15 minutes, as follows:  -Edema mgmt w/ lymphatic drainage technique;  Deep STM, including MFR, CFM, TPR & scar mgmt to L elbow/forearm/wrist/hand, using hand techniques and IASTM tools to increase blood flow/circulation, improve soft tissue pliability and decrease pain.  -Joint mobilizations: L RF & SF PIP, al planes of mobility, grade 2-3, including distractions      Sincere received therapeutic exercises for 35 minutes including:    PROM, LLLD stretch to RF & SF PIP in extension w/ MDM  10 reps, 15 sec hold each             T-Putty    -Molding 3 min, red   -Graspmanipulation 3 reps, red   -RF/SF Raking 3 trials, w/ yellow putty & therapist assist   -Log rolls with lateral pinch & ex splint 10 reps, red, w/ assist for IP extension   -Log rolls w/ tripod pinch 2 logs   -Cones 3 reps, yellow   -Dowel digs 3 min, yellow/green        Home Exercises and Education Provided     Education provided:   -Reviewed HEP     Written Home Exercises Provided: Patient instructed to cont prior HEP.  Exercises were reviewed and Sincere was able to demonstrate them prior to the end of the session.  Sincere demonstrated good  understanding of the HEP provided.     See EMR under Patient Instructions for exercises provided prior visit.      Assessment     See above assessment for details.     Pt will continue to benefit from skilled outpatient occupational therapy to address the deficits listed in the problem list on initial evaluation provide pt/family education and to maximize  pt's level of independence in the home and community environment.     Anticipated barriers to occupational therapy: pain    Pt's spiritual, cultural and educational needs considered and pt agreeable to plan of care and goals.    Plan     See above Updated Plan of Care for details     Joseph Carlin, OT

## 2019-12-23 ENCOUNTER — TELEPHONE (OUTPATIENT)
Dept: NEPHROLOGY | Facility: CLINIC | Age: 59
End: 2019-12-23

## 2019-12-23 NOTE — TELEPHONE ENCOUNTER
----- Message from Stefany Perez sent at 12/23/2019  8:57 AM CST -----  Contact: patient  Type:  Sooner Apoointment Request    Caller is requesting a sooner appointment.  Caller declined first available appointment listed below.  Caller will not accept being placed on the waitlist and is requesting a message be sent to doctor.    Name of Caller:  Patient  When is the first available appointment?  Unavailable  Symptoms:  Numbness and pain in right foot  Best Call Back Number:    Additional Information:  Per pt would like to schedule-recommended by doctor-please advise-thank you

## 2019-12-23 NOTE — TELEPHONE ENCOUNTER
Returned pt's call; pt stated that her right foot is completely numb and painful; explained to pt that Dr Rebollar doesn't have any availability until Feb. Pt stated urgency in being seen explained to pt that I would send message to Dr Rebollar's nurse to see if we can fit her in some where. Pt verbalized understanding.

## 2019-12-27 ENCOUNTER — TELEPHONE (OUTPATIENT)
Dept: NEUROLOGY | Facility: CLINIC | Age: 59
End: 2019-12-27

## 2019-12-27 DIAGNOSIS — G60.9 IDIOPATHIC PERIPHERAL NEUROPATHY: Primary | ICD-10-CM

## 2019-12-27 NOTE — TELEPHONE ENCOUNTER
Returned call to pt and discussed necessity for referral prior to scheduling a consult with Dr. Rebollar. Discussed previous message from Dr. Rebollar about pts need to be seen sooner. Pt verbalized understanding.

## 2019-12-27 NOTE — TELEPHONE ENCOUNTER
----- Message from Joel Lynch sent at 12/27/2019 10:39 AM CST -----  Contact: pt @ [phone]  Pt states Dr. Lexi Rebollar is requesting referral to see pt. Pt asking for a call back once referral is placed.

## 2019-12-27 NOTE — TELEPHONE ENCOUNTER
----- Message from Eva Henrandez sent at 12/27/2019  9:47 AM CST -----  Contact: Patient  Type: Needs Medical Advice    Who Called:  Patient  Best Call Back Number: [phone]  Additional Information: Patient is calling to follow up to see if she can see someone sooner.Please call back and advise.

## 2020-01-03 ENCOUNTER — TELEPHONE (OUTPATIENT)
Dept: NEUROLOGY | Facility: CLINIC | Age: 60
End: 2020-01-03

## 2020-01-03 NOTE — TELEPHONE ENCOUNTER
----- Message from Alyson Israel sent at 1/3/2020  9:47 AM CST -----  Contact: Patient  Type: Needs Medical Advice    Who Called:  Patient  Best Call Back Number:   Additional Information: Calling to speak with the Nurse to schedule a new patient appt. Her right foot is numb. There is a referral in the system

## 2020-01-03 NOTE — TELEPHONE ENCOUNTER
Returned call to pt and scheduled consult with Dr. Rebollar; date/time/location confirmed; verbalized understanding. Appt added to waiting list.

## 2020-01-06 ENCOUNTER — CLINICAL SUPPORT (OUTPATIENT)
Dept: REHABILITATION | Facility: HOSPITAL | Age: 60
End: 2020-01-06
Attending: PLASTIC SURGERY
Payer: COMMERCIAL

## 2020-01-06 DIAGNOSIS — M79.602 LEFT ARM PAIN: ICD-10-CM

## 2020-01-06 DIAGNOSIS — M25.632 STIFFNESS OF LEFT WRIST JOINT: ICD-10-CM

## 2020-01-06 DIAGNOSIS — M62.81 MUSCLE WEAKNESS: ICD-10-CM

## 2020-01-06 DIAGNOSIS — R20.9 SENSORY DISTURBANCE: ICD-10-CM

## 2020-01-06 PROCEDURE — 97140 MANUAL THERAPY 1/> REGIONS: CPT | Mod: PO

## 2020-01-06 PROCEDURE — 97018 PARAFFIN BATH THERAPY: CPT | Mod: PO

## 2020-01-06 PROCEDURE — 97110 THERAPEUTIC EXERCISES: CPT | Mod: PO

## 2020-01-06 NOTE — PROGRESS NOTES
"    Occupational Therapy Daily Treatment Note     Date: 1/6/2020  Name: Sincere Riggins  Clinic Number: [id number]    Therapy Diagnosis:   Encounter Diagnoses   Name Primary?    Left arm pain     Stiffness of left wrist joint     Muscle weakness     Sensory disturbance      Physician: Vadim Harp Jr*    Physician Orders: Eval & Treat, post op care  Medical Diagnosis: L CTR & CuTR  Surgical Procedure and Date:  L CTR & CuTR, 06/07/19  Evaluation Date: 06/27/19  Insurance Authorization Period Expiration: 12/31/2020    Visit # / Visits authorized: 38  Time In: 1:10 pm  Time Out:  12:05 pm  Total Billable Time: 55 minutes (P, 1MT, 2TE)    Precautions:  Standard    Subjective     Pt reports: "I do these exercises all the time. I try to make my muscles in my and move all the time. The strength is taking a long time to come back"   she was compliant with home exercise program.   Response to previous treatment: decreased pain and increased AROM   Functional change: able to hold small light objects, able to make a loose composite fist    Pain:  1/10 pre-tx; 2/10 at night   Location: left elbow/wrist/hand      Objective     Sincere received the following supervised modalities after being cleared for contradictions for 10 minutes:   -Paraffin w/ MHP to L wrist/ hand with all digits in composite fist using Coban wrap, pre-tx to decrease pain & increase tissue extensibility     Sincere received manual therapy for 15 minutes, as follows:  -Edema mgmt w/ lymphatic drainage technique;  Deep STM, including MFR, CFM, TPR & scar mgmt to L elbow/forearm/wrist/hand, using hand techniques and IASTM tools to increase blood flow/circulation, improve soft tissue pliability and decrease pain.  -Joint mobilizations: L RF & SF PIP, al planes of mobility, grade 2-3, including distractions      Sincere received therapeutic exercises for 35 minutes including:    PROM, LLLD stretch to RF & SF PIP in extension w/ MDM  10 reps, 15 sec hold each "   AROM:    Isolated activation of intrinsics of IF & SF 2/10 reps with assist   Finger Adduction w/ blue foam 2/10 reps   Isospheres 3 min   9 Peg  3 trial, remove/replace    Elbow-3 ways 10 reps, each way, 3# wt   Smiles & Frowns 2/10 reps each way, red T-bar   Wrist 3 ways over wedge  2/10 reps, 2# wt         T-Putty    -Molding 3 min, red   -Graspmanipulation 3 reps, red   -RF/SF Raking 3 trials, w/ yellow putty & therapist assist   -Log rolls with lateral pinch & ex splint 10 reps, red, w/ assist for IP extension   -Log rolls w/ tripod pinch 2 logs       Home Exercises and Education Provided     Education provided:   -Reviewed HEP     Written Home Exercises Provided: Patient instructed to cont prior HEP.  Exercises were reviewed and Sincere was able to demonstrate them prior to the end of the session.  Sincere demonstrated good  understanding of the HEP provided.     See EMR under Patient Instructions for exercises provided prior visit.      Assessment     Patient is noted to have a slight improvement of intrinsic muscle activation of L IF & SF today. She is also noted to have a slight increase in muscle mass of intrinsic hand muscles, however, she continues to have difficulty with activating these muscles spontaneously.  She was noted to have some fatigue today during resistive exercises for her L wrist, but was able to complete all TE's. She was also noted to have a slight increase in myofascial tightness and a bit of brawniness in her hypothenar muscles today, but improved with MT and use of IASTM tools.  Deficits in strength and coordination remain and patient would benefit from continued therapy to maximize functioning to highest level.  She continues to have good potential for further improvement with continued OT services.    Pt will continue to benefit from skilled outpatient occupational therapy to address the deficits listed in the problem list on initial evaluation provide pt/family education and to  maximize pt's level of independence in the home and community environment.     Anticipated barriers to occupational therapy: pain    Pt's spiritual, cultural and educational needs considered and pt agreeable to plan of care and goals.    Goals:  Short Term Goals:   1) Patient will be independent in HEP-met  2) Decrease pain in L elbow/wrist/hand to no more than 4-5/10 worst in ADL/IADL's-met  3) Increase AROM in L wrist Extension by 10 degrees for improved functioning in ADL/IADL's-met  4) Assess  & pinch strength-met  5) Decrease edema in L wrist by .2-.3 cm-met   6) Increase L IF-SF COSTA's by 10-15 degrees for increased functional use of hand in ADL/IADL's-met     Long Term Goals:   1) Decrease pain in L elbow/wrist/hand to no more than 1-2/10 worst in ADL/IADL's-part met   2) Increase AROM of L elbow/wrist/hand to WFL for increased functioning in ADL/IADL's-met  3) Increase strength in L  & pinch by 20% of initial measures for improved functioning in ADL/IADL's-ongoing  4) Increased functioning in ADL/IADL's, as evidenced by a FOTO impairment rating of no more than 35%-part met  5) Decrease edema in L elbow/wrist/hand to trace or none-met    Plan     Updated Certification Period: 11/14/19 to 01/14/20    Recommended Treatment Plan: 1-2 times per week for 8 weeks: Electrical Stimulation NMES/IFC, Manual Therapy, Moist Heat/ Ice, Neuromuscular Re-ed, Orthotic Management and Training, Paraffin, Patient Education, Therapeutic Activites, Therapeutic Exercise and Ultrasound    Other Recommendations: continued orthotic program for L RF & SF PIP extension    Joseph Carlin, OT

## 2020-01-10 ENCOUNTER — OFFICE VISIT (OUTPATIENT)
Dept: NEUROLOGY | Facility: CLINIC | Age: 60
End: 2020-01-10
Payer: COMMERCIAL

## 2020-01-10 VITALS
SYSTOLIC BLOOD PRESSURE: 117 MMHG | WEIGHT: 125 LBS | HEIGHT: 64 IN | BODY MASS INDEX: 21.34 KG/M2 | HEART RATE: 82 BPM | DIASTOLIC BLOOD PRESSURE: 71 MMHG

## 2020-01-10 DIAGNOSIS — G60.9 CHRONIC IDIOPATHIC AXONAL POLYNEUROPATHY: ICD-10-CM

## 2020-01-10 PROCEDURE — 99214 OFFICE O/P EST MOD 30 MIN: CPT | Mod: S$GLB,,, | Performed by: PSYCHIATRY & NEUROLOGY

## 2020-01-10 PROCEDURE — 99999 PR PBB SHADOW E&M-EST. PATIENT-LVL III: ICD-10-PCS | Mod: PBBFAC,,, | Performed by: PSYCHIATRY & NEUROLOGY

## 2020-01-10 PROCEDURE — 99999 PR PBB SHADOW E&M-EST. PATIENT-LVL III: CPT | Mod: PBBFAC,,, | Performed by: PSYCHIATRY & NEUROLOGY

## 2020-01-10 PROCEDURE — 3008F BODY MASS INDEX DOCD: CPT | Mod: CPTII,S$GLB,, | Performed by: PSYCHIATRY & NEUROLOGY

## 2020-01-10 PROCEDURE — 99214 PR OFFICE/OUTPT VISIT, EST, LEVL IV, 30-39 MIN: ICD-10-PCS | Mod: S$GLB,,, | Performed by: PSYCHIATRY & NEUROLOGY

## 2020-01-10 PROCEDURE — 3008F PR BODY MASS INDEX (BMI) DOCUMENTED: ICD-10-PCS | Mod: CPTII,S$GLB,, | Performed by: PSYCHIATRY & NEUROLOGY

## 2020-01-10 NOTE — LETTER
January 10, 2020      Kymberly Ascencio PA-C  [address]  [facility] [address]           [facility] - Neurology Epilepsy  [address]  [facility] [address]  Phone: [phone]  Fax: [phone]          Patient: Sincere Riggins   MR Number: [id number]   YOB: 1960   Date of Visit: 1/10/2020       Dear Kymberly Ascencio:    Thank you for referring Sincere Riggins to me for evaluation. Attached you will find relevant portions of my assessment and plan of care.    If you have questions, please do not hesitate to call me. I look forward to following Sincere Riggins along with you.    Sincerely,    Rolan Crespo MD    Enclosure  CC:  No Recipients    If you would like to receive this communication electronically, please contact [email or web address] or [phone] to request more information on [employer] Link access.    For providers and/or their staff who would like to refer a patient to [facility], please contact us through our one-stop-shop provider referral line, [facility], at [phone].    If you feel you have received this communication in error or would no longer like to receive these types of communications, please e-mail [email or web address]

## 2020-01-10 NOTE — PROGRESS NOTES
[facility] - NEUROLOGY EPILEPSY  [facility], [place]    Date: 1/10/20  Patient Name: Sincere Riggins   MRN: [id number]   PCP: Erasto Morgan  Referring Provider: Kymberly Ascencio, *    Assessment:   Sincere Riggins is a 59 y.o. female  Presenting in follow-up for idiopathic axonal polyneuropathy.  Diagnosis at length.  Patient has had extensively workup indicated at this time.  Patient may continue gabapentin notes needed.  Personally reviewed labs and imaging as discussed below.  Plan:     Problem List Items Addressed This Visit        Neuro    Chronic idiopathic axonal polyneuropathy    Current Assessment & Plan     -- gabapentin 300 mg PRN               Rolan Crespo MD  [facility]   Department of Neurology    Patient note was created using MModal Dictation.  Any errors in syntax or even information may not have been identified and edited on initial review prior to signing this note.  Subjective:   Patient seen in consultation at the request of Kymberly Ascencio, * for the evaluation of polyneuropathy.  A copy of this note will be sent to the referring physician.        HPI:   Ms. Sincere Riggins is a 59 y.o. female  presenting for evaluation of idiopathic axonal polyneuropathy.  The patient has previously been evaluated by Dr. Ruth is presenting today in continuity.  Patient reports that she has been doing reasonably well since her last visit with minimal progression of symptoms.  She states she notes that she has had very slight progression of numbness of her right foot.  She states that she has been wearing Nanosocks which she states is helpful for her symptoms.  She is using gabapentin as needed and has not had any side effects.  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  She also has known compressive ulnar and median neuropathy and  has undergone a release on the left side.  She has undergone extensive lab evaluation for underlying causes her axonal polyneuropathy that has been negative to read of note, today the patient states that she has noted that she has had high arches and hammertoes since she was young woman.  She feels she has been symptomatic, on the whole, for 15 years.    PAST MEDICAL HISTORY:  Past Medical History:   Diagnosis Date    Carpal tunnel syndrome     Hypercholesteremia        PAST SURGICAL HISTORY:  Past Surgical History:   Procedure Laterality Date    CARPAL TUNNEL RELEASE Left 6/7/2019    Procedure: RELEASE, CARPAL TUNNEL;  Surgeon: Vadim Harp Jr., MD;  Location: [facility];  Service: Plastics;  Laterality: Left;    COLONOSCOPY      NERVE TRANSFER Left 6/7/2019    Procedure: TRANSPOSITION, NERVE ulnar;  Surgeon: Vadim Harp Jr., MD;  Location: [facility];  Service: Plastics;  Laterality: Left;    WISDOM TOOTH EXTRACTION         CURRENT MEDS:  Current Outpatient Medications   Medication Sig Dispense Refill    calcium carbonate (OS-BRITTANY) 500 mg calcium (1,250 mg) tablet Take 1 tablet by mouth once daily.      estradiol (VIVELLE-DOT) 0.0375 mg/24 hr 1 patch every 7 days.       fish oil-omega-3 fatty acids 300-1,000 mg capsule Take by mouth once daily.      gabapentin (NEURONTIN) 300 MG capsule 2 daily 180 capsule 3    HYDROcodone-acetaminophen (NORCO) 5-325 mg per tablet Take 1 tablet by mouth every 4 (four) hours as needed for Pain. 30 tablet 0    medroxyPROGESTERone (PROVERA) 5 MG tablet once daily.       multivitamin capsule Take 1 capsule by mouth once daily.      pravastatin (PRAVACHOL) 10 MG tablet       temazepam (RESTORIL) 15 mg Cap Take 15 mg by mouth nightly as needed.   2    vitamin D 1000 units Tab Take 1,000 Units by mouth once daily.       No current facility-administered medications for this visit.        ALLERGIES:  Review of patient's allergies indicates:  No Known  "Allergies    FAMILY HISTORY:  Family History   Problem Relation Age of Onset    Alzheimer's disease Mother     Diabetes Father     No Known Problems Maternal Grandmother     Leukemia Maternal Grandfather     Heart disease Paternal Grandmother     Emphysema Paternal Grandfather        SOCIAL HISTORY:  Social History     Tobacco Use    Smoking status: Never Smoker    Smokeless tobacco: Never Used   Substance Use Topics    Alcohol use: Yes     Comment: occasional     Drug use: Not on file       Review of Systems:  12 system review of systems is negative except for the symptoms mentioned in HPI.      Objective:     Vitals:    01/10/20 1429   BP: 117/71   Pulse: 82   Weight: 56.7 kg (125 lb)   Height: 5' 4" (1.626 m)     General: NAD, well nourished   Eyes: no tearing, discharge, no erythema   ENT: moist mucous membranes of the oral cavity, nares patent    Neck: Supple, full range of motion  Cardiovascular: Warm and well perfused, pulses equal and symmetrical  Lungs: Normal work of breathing, normal chest wall excursions  Skin: No rash, lesions, or breakdown on exposed skin  Psychiatry: Mood and affect are appropriate   Abdomen: soft, non tender, non distended  Extremeties: No cyanosis, clubbing or edema.    Neurological   MENTAL STATUS: Alert and oriented to person, place, and time. Attention and concentration within normal limits. Speech without dysarthria, able to name and repeat without difficulty. Recent and remote memory within normal limits   CRANIAL NERVES: Visual fields intact. PERRL. EOMI. Facial sensation intact. Face symmetrical. Hearing grossly intact. Full shoulder shrug bilaterally. Tongue protrudes midline   SENSORY: Sensation is intact to pin, light touch, vibration, proprioception and temperature to ankles.    MOTOR: Normal bulk and tone. 5/5 deltoid, biceps, triceps, interosseous, hand  bilaterally. 5/5 iliopsoas, knee extension/flexion, foot dorsi/plantarflexion bilaterally.    REFLEXES: " Symmetric and 1+ throughout. CEREBELLAR/COORDINATION/GAIT: Gait steady with normal arm swing and stride length. Finger to nose intact. Normal rapid alternating movements.

## 2020-01-13 ENCOUNTER — CLINICAL SUPPORT (OUTPATIENT)
Dept: REHABILITATION | Facility: HOSPITAL | Age: 60
End: 2020-01-13
Attending: PLASTIC SURGERY
Payer: COMMERCIAL

## 2020-01-13 DIAGNOSIS — M79.602 LEFT ARM PAIN: ICD-10-CM

## 2020-01-13 DIAGNOSIS — M25.632 STIFFNESS OF LEFT WRIST JOINT: ICD-10-CM

## 2020-01-13 DIAGNOSIS — M62.81 MUSCLE WEAKNESS: ICD-10-CM

## 2020-01-13 DIAGNOSIS — G60.9 CHRONIC IDIOPATHIC AXONAL POLYNEUROPATHY: ICD-10-CM

## 2020-01-13 PROCEDURE — 97018 PARAFFIN BATH THERAPY: CPT | Mod: PO

## 2020-01-13 PROCEDURE — 97032 APPL MODALITY 1+ESTIM EA 15: CPT | Mod: PO

## 2020-01-13 PROCEDURE — 97110 THERAPEUTIC EXERCISES: CPT | Mod: PO

## 2020-01-13 PROCEDURE — 97140 MANUAL THERAPY 1/> REGIONS: CPT | Mod: PO

## 2020-01-13 NOTE — PROGRESS NOTES
"    Occupational Therapy Daily Treatment Note     Date: 1/13/2020  Name: Sincere Riggins  Clinic Number: [id number]    Therapy Diagnosis:   Encounter Diagnoses   Name Primary?    Left arm pain     Stiffness of left wrist joint     Muscle weakness     Chronic idiopathic axonal polyneuropathy      Physician: Vadim Harp Jr*    Physician Orders: Eval & Treat, post op care  Medical Diagnosis: L CTR & CuTR  Surgical Procedure and Date:  L CTR & CuTR, 06/07/19  Evaluation Date: 06/27/19  Insurance Authorization Period Expiration: 12/31/2020    Visit # / Visits authorized: 39  Time In: 10:10 pm  Time Out:  11:05 pm  Total Billable Time: 55 minutes (P, 1MT, 1TE, 1E-stim)    Precautions:  Standard    Subjective     Pt reports: "I know it takes a long time for the nerve to regenerate and I know it's getting better. I get disappointed at times because it takes so long"   she was compliant with home exercise program.   Response to previous treatment: decreased pain and increased AROM   Functional change: able to hold small light objects, able to make a loose composite fist    Pain:  1/10 pre-tx; 2/10 at night   Location: left elbow/wrist/hand      Objective     Sincere received the following supervised modalities after being cleared for contradictions for 10 minutes:   -Paraffin w/ MHP to L wrist/ hand with all digits in composite fist using Coban wrap, pre-tx to decrease pain & increase tissue extensibility  -Accu-pen used for E-stim: NMES to L hand intrinsics (various motor points) at 10 Hz, sensitivity 3-4 and intensity 6-8 for 15 minutes      Sincere received manual therapy for 15 minutes, as follows:  -Edema mgmt w/ lymphatic drainage technique;  Deep STM, including MFR, CFM, TPR & scar mgmt to L elbow/forearm/wrist/hand, using hand techniques and IASTM tools to increase blood flow/circulation, improve soft tissue pliability and decrease pain.  -Joint mobilizations: L RF & SF PIP, al planes of mobility, grade 2-3, " including distractions     Sincere received therapeutic exercises for 15 minutes including:    T-Putty    -Molding 3 min, red   -Graspmanipulation 3 reps, red   -RF/SF Raking 3 trials, w/ yellow putty & therapist assist   -Log rolls with lateral pinch & ex splint 10 reps, red, w/ assist for IP extension   -Log rolls w/ tripod pinch 2 logs       Home Exercises and Education Provided     Education provided:   -Reviewed HEP     Written Home Exercises Provided: Patient instructed to cont prior HEP.  Exercises were reviewed and Sincere was able to demonstrate them prior to the end of the session.  Sincere demonstrated good  understanding of the HEP provided.     See EMR under Patient Instructions for exercises provided prior visit.      Assessment     Successful activation of L thumb APB & FPB, IF-SF lumbricals, IF/LF PAD's (interossei) and ADM & FDB was observed today with E-stim, however, patient continues to present with decreased ability to adduct and abduct RF & SF, regardless of E-stim.  Innervation is noted to be intact mid palm of ulnar nerve distribution.  Soft tissue over hypothenar muscles has improved today. Deficits in strength and coordination remain and patient would benefit from continued therapy to maximize functioning to highest level.  She continues to have good potential for further improvement with continued OT services.    Anticipated barriers to occupational therapy: pain    Pt's spiritual, cultural and educational needs considered and pt agreeable to plan of care and goals.    Goals:  Short Term Goals:   1) Patient will be independent in HEP-met  2) Decrease pain in L elbow/wrist/hand to no more than 4-5/10 worst in ADL/IADL's-met  3) Increase AROM in L wrist Extension by 10 degrees for improved functioning in ADL/IADL's-met  4) Assess  & pinch strength-met  5) Decrease edema in L wrist by .2-.3 cm-met   6) Increase L IF-SF COSTA's by 10-15 degrees for increased functional use of hand in ADL/IADL's-met      Long Term Goals:   1) Decrease pain in L elbow/wrist/hand to no more than 1-2/10 worst in ADL/IADL's-part met   2) Increase AROM of L elbow/wrist/hand to WFL for increased functioning in ADL/IADL's-met  3) Increase strength in L  & pinch by 20% of initial measures for improved functioning in ADL/IADL's-ongoing  4) Increased functioning in ADL/IADL's, as evidenced by a FOTO impairment rating of no more than 35%-part met  5) Decrease edema in L elbow/wrist/hand to trace or none-met    Plan     Updated Certification Period: 11/14/19 to 01/14/20    Recommended Treatment Plan: 1-2 times per week for 8 weeks: Electrical Stimulation NMES/IFC, Manual Therapy, Moist Heat/ Ice, Neuromuscular Re-ed, Orthotic Management and Training, Paraffin, Patient Education, Therapeutic Activites, Therapeutic Exercise and Ultrasound    Other Recommendations: continued orthotic program for L RF & SF PIP extension    Joseph Carlin, OT

## 2020-01-20 ENCOUNTER — CLINICAL SUPPORT (OUTPATIENT)
Dept: REHABILITATION | Facility: HOSPITAL | Age: 60
End: 2020-01-20
Attending: PLASTIC SURGERY
Payer: COMMERCIAL

## 2020-01-20 DIAGNOSIS — M79.602 LEFT ARM PAIN: ICD-10-CM

## 2020-01-20 DIAGNOSIS — G60.9 CHRONIC IDIOPATHIC AXONAL POLYNEUROPATHY: ICD-10-CM

## 2020-01-20 DIAGNOSIS — M62.81 MUSCLE WEAKNESS: ICD-10-CM

## 2020-01-20 DIAGNOSIS — M25.632 STIFFNESS OF LEFT WRIST JOINT: ICD-10-CM

## 2020-01-20 PROCEDURE — 97018 PARAFFIN BATH THERAPY: CPT | Mod: PO

## 2020-01-20 PROCEDURE — 97140 MANUAL THERAPY 1/> REGIONS: CPT | Mod: PO

## 2020-01-20 PROCEDURE — 97032 APPL MODALITY 1+ESTIM EA 15: CPT | Mod: PO

## 2020-01-20 NOTE — PROGRESS NOTES
"    Occupational Therapy Daily Treatment Note     Date: 1/20/2020  Name: Sincere Riggins  Clinic Number: [id number]    Therapy Diagnosis:   Encounter Diagnoses   Name Primary?    Left arm pain     Stiffness of left wrist joint     Muscle weakness     Chronic idiopathic axonal polyneuropathy      Physician: Vadim Harp Jr*    Physician Orders: Eval & Treat, post op care  Medical Diagnosis: L CTR & CuTR  Surgical Procedure and Date:  L CTR & CuTR, 06/07/19  Evaluation Date: 06/27/19  Insurance Authorization Period Expiration: 12/31/2020    Visit # / Visits authorized: 40 total  Time In: 2:15 pm  Time Out:  3:00 pm  Total Billable Time: 45 minutes (P, 1MT, 1TE, 1E-stim)    Precautions:  Standard    Subjective     Pt reports: "I know it takes a long time for the nerve to regenerate and I know it's getting better. I get disappointed at times because it takes so long"   she was compliant with home exercise program.   Response to previous treatment: decreased pain and increased AROM   Functional change: able to hold small light objects, able to make a loose composite fist    Pain:  1/10 pre-tx; 2/10 at night   Location: left elbow/wrist/hand      Objective     Sincere received the following supervised modalities after being cleared for contradictions for 10 minutes:   -Paraffin w/ MHP to L wrist/ hand with all digits in composite fist using Coban wrap, pre-tx to decrease pain & increase tissue extensibility    Sincere received the following direct care modalities after being cleared for contraindication for 20 minutes:  -Accu-pen used for functional E-stim: FES/NMES to L hand intrinsics (various motor points) at 10 Hz, sensitivity 3-4 and intensity 6-8 for 15 minutes   -performed AAROM exercises for all intrinsic muscles of L hand, including PAD/DAB's, FDI, thumb adductor, ADM, etc      Sincere received manual therapy for 15 minutes, as follows:  -Edema mgmt w/ lymphatic drainage technique;  Deep STM, including MFR, CFM, " TPR & scar mgmt to L elbow/forearm/wrist/hand, using hand techniques and IASTM tools to increase blood flow/circulation, improve soft tissue pliability and decrease pain.  -Joint mobilizations: L RF & SF PIP, al planes of mobility, grade 2-3, including distractions       Home Exercises and Education Provided     Education provided:   -Reviewed HEP     Written Home Exercises Provided: Patient instructed to cont prior HEP.  Exercises were reviewed and Sincere was able to demonstrate them prior to the end of the session.  Sincere demonstrated good  understanding of the HEP provided.     See EMR under Patient Instructions for exercises provided prior visit.      Assessment     Patient continues to present with fairly good activation of L hand intrinsic muscles, including thumb APB & FPB, IF-SF lumbricals, IF/LF PAD's (interossei) and ADM & FDB with use of FES E-stim.  Improvement in Ulnar nerve distribution is noted with each visit.  Soft tissue over hypothenar muscles continues to improve with each visit.  Deficits in strength and coordination remain and patient would benefit from continued therapy to maximize functioning to highest level.  She continues to have good potential for further improvement with continued OT services.    Anticipated barriers to occupational therapy: pain    Pt's spiritual, cultural and educational needs considered and pt agreeable to plan of care and goals.    Goals:  Short Term Goals:   1) Patient will be independent in HEP-met  2) Decrease pain in L elbow/wrist/hand to no more than 4-5/10 worst in ADL/IADL's-met  3) Increase AROM in L wrist Extension by 10 degrees for improved functioning in ADL/IADL's-met  4) Assess  & pinch strength-met  5) Decrease edema in L wrist by .2-.3 cm-met   6) Increase L IF-SF COSTA's by 10-15 degrees for increased functional use of hand in ADL/IADL's-met     Long Term Goals:   1) Decrease pain in L elbow/wrist/hand to no more than 1-2/10 worst in ADL/IADL's-part  met   2) Increase AROM of L elbow/wrist/hand to WFL for increased functioning in ADL/IADL's-met  3) Increase strength in L  & pinch by 20% of initial measures for improved functioning in ADL/IADL's-ongoing  4) Increased functioning in ADL/IADL's, as evidenced by a FOTO impairment rating of no more than 35%-part met  5) Decrease edema in L elbow/wrist/hand to trace or none-met    Plan     Updated Certification Period: 11/14/19 to 01/14/20    Recommended Treatment Plan: 1-2 times per week for 8 weeks: Electrical Stimulation NMES/IFC, Manual Therapy, Moist Heat/ Ice, Neuromuscular Re-ed, Orthotic Management and Training, Paraffin, Patient Education, Therapeutic Activites, Therapeutic Exercise and Ultrasound    Other Recommendations: continued orthotic program for L RF & SF PIP extension    Joseph Carlin, OT

## 2020-01-22 ENCOUNTER — OFFICE VISIT (OUTPATIENT)
Dept: ORTHOPEDICS | Facility: CLINIC | Age: 60
End: 2020-01-22
Payer: COMMERCIAL

## 2020-01-22 VITALS
SYSTOLIC BLOOD PRESSURE: 120 MMHG | HEART RATE: 77 BPM | BODY MASS INDEX: 21.34 KG/M2 | HEIGHT: 64 IN | DIASTOLIC BLOOD PRESSURE: 77 MMHG | WEIGHT: 125 LBS

## 2020-01-22 DIAGNOSIS — R29.898 LEFT HAND WEAKNESS: ICD-10-CM

## 2020-01-22 DIAGNOSIS — G56.01 RIGHT CARPAL TUNNEL SYNDROME: Primary | ICD-10-CM

## 2020-01-22 PROCEDURE — 20526 PR INJECT CARPAL TUNNEL: ICD-10-PCS | Mod: RT,S$GLB,, | Performed by: PLASTIC SURGERY

## 2020-01-22 PROCEDURE — 99999 PR PBB SHADOW E&M-EST. PATIENT-LVL III: ICD-10-PCS | Mod: PBBFAC,,, | Performed by: PLASTIC SURGERY

## 2020-01-22 PROCEDURE — 3008F BODY MASS INDEX DOCD: CPT | Mod: CPTII,S$GLB,, | Performed by: PLASTIC SURGERY

## 2020-01-22 PROCEDURE — 99213 OFFICE O/P EST LOW 20 MIN: CPT | Mod: 25,S$GLB,, | Performed by: PLASTIC SURGERY

## 2020-01-22 PROCEDURE — 99213 PR OFFICE/OUTPT VISIT, EST, LEVL III, 20-29 MIN: ICD-10-PCS | Mod: 25,S$GLB,, | Performed by: PLASTIC SURGERY

## 2020-01-22 PROCEDURE — 99999 PR PBB SHADOW E&M-EST. PATIENT-LVL III: CPT | Mod: PBBFAC,,, | Performed by: PLASTIC SURGERY

## 2020-01-22 PROCEDURE — 20526 THER INJECTION CARP TUNNEL: CPT | Mod: RT,S$GLB,, | Performed by: PLASTIC SURGERY

## 2020-01-22 PROCEDURE — 3008F PR BODY MASS INDEX (BMI) DOCUMENTED: ICD-10-PCS | Mod: CPTII,S$GLB,, | Performed by: PLASTIC SURGERY

## 2020-01-22 RX ORDER — TRIAMCINOLONE ACETONIDE 40 MG/ML
40 INJECTION, SUSPENSION INTRA-ARTICULAR; INTRAMUSCULAR
Status: COMPLETED | OUTPATIENT
Start: 2020-01-22 | End: 2020-01-22

## 2020-01-22 RX ADMIN — TRIAMCINOLONE ACETONIDE 40 MG: 40 INJECTION, SUSPENSION INTRA-ARTICULAR; INTRAMUSCULAR at 10:01

## 2020-01-22 NOTE — PROGRESS NOTES
Subjective:      Patient ID: Sincere Riggins is a 59 y.o. female.    Chief Complaint: Pain of the Left Elbow and Pain of the Left Wrist    Sincere Riggins is 6 months out from a left carpal tunnel release and left ulnar nerve transposition.  She has been doing her at home exercise program and feels that her hand is progressing very well. She feels that her Tinel's sign is advancing.  She is beginning to have more sensations within the left hand.  She still has left hand weakness. She continues to complain of numbness and tingling within the right ring and small digit tips.  She states the injection she received in October of last year helped the numbness and tingling however she is beginning to feel it much more.  She has been wearing a wrist splint at night with little relief.  She denies any other complaints    Pain   Associated symptoms include numbness. Pertinent negatives include no joint swelling.       Review of Systems   Musculoskeletal: Positive for stiffness. Negative for joint pain and joint swelling.   Neurological: Positive for numbness and paresthesias.   All other systems reviewed and are negative.        Objective:            General    Vitals reviewed.  Constitutional: She is oriented to person, place, and time. She appears well-nourished. No distress.   Pulmonary/Chest: Effort normal.   Neurological: She is alert and oriented to person, place, and time.   Psychiatric: She has a normal mood and affect.             Right Hand/Wrist Exam     Inspection   Effusion: Hand -  absent  Deformity: Hand -  deformity    Tests   Tinel's sign (median nerve): positive  Carpal Tunnel Compression Test: positive    Atrophy   Thenar:  negative  Hypothenar:  negative    Other     Neuorologic Exam    Median Distribution: normal  Ulnar Distribution: normal  Radial Distribution: normal      Left Hand/Wrist Exam     Range of Motion     Finger Flexion   PIP Ring: normal   PIP Little: normal   DIP Ring: normal   DIP Little:  normal     Finger Extension   PIP Ring: abnormal  PIP Little: abnormal  DIP Ring: abnormal  DIP Little: abnormal    Tests     Atrophy  Thenar:  Negative  Hypothenar:  positive  Intrinsic: positive  1st Dorsal Interosseous:  positive    Other     Sensory Exam  Median Distribution: normal  Ulnar Distribution: normal  Radial Distribution: abnormal    Comments:  Improving strength within the intrinsic muscles of the left hand      Right Elbow Exam     Tests   Tinel's sign (cubital tunnel): negative    Comments:  Positive Tinel's over the ulnar nerve through Guyon's canal        I have explained the risks, benefits, and alternatives of the procedure in detail.  The patient voices understanding and all questions have been answered.  The patient agrees to proceed as planned. After a sterile prep of the skin the right carpal tunnel is injected from the volar approach using a 25 gauge needle with a combination of 1cc 1% plain xylocaine and 40 mg of Kenalog.  The patient is cautioned and immediate relief of pain is secondary to the local anesthetic and will be temporary.  After the anesthetic wears off there may be a increase in pain that may last for a few hours or a few days and they should use ice to help alleviate this flair up of pain.               Assessment:       Encounter Diagnoses   Name Primary?    Right carpal tunnel syndrome Yes    Left hand weakness           Plan:       Sincere was seen today for pain and pain.    Diagnoses and all orders for this visit:    Right carpal tunnel syndrome    Left hand weakness    Other orders  -     triamcinolone acetonide injection 40 mg        She continues to advance in strength and sensation in the left hand which is a good sign of his successful surgery and hopefully a full recovery.  She is exhibiting compression symptoms in the right hand however he EMG only demonstrated carpal tunnel and the right hand.  I am finding that she has a positive Tinel's over the ulnar nerve  through Guyon's canal suggestive possible compression of the ulnar nerve which could be contributing to her numbness and tingling in the ring and small digits.  She was advised to continue with wrist splinting.  We discussed performing a repeat injection to see if this would help alleviate her symptoms.  She was also advised to splint the elbow straight at night while sleeping to avoid compression ulnar nerve posterior to the elbow.  I discussed the possibility of repeating the EMG and near future.  We will see how the injection today helped her symptoms over the next 2 months.  All questions and concerns were addressed prior to discharge

## 2020-01-27 ENCOUNTER — CLINICAL SUPPORT (OUTPATIENT)
Dept: REHABILITATION | Facility: HOSPITAL | Age: 60
End: 2020-01-27
Attending: PLASTIC SURGERY
Payer: COMMERCIAL

## 2020-01-27 DIAGNOSIS — M79.602 LEFT ARM PAIN: ICD-10-CM

## 2020-01-27 DIAGNOSIS — M62.81 MUSCLE WEAKNESS: ICD-10-CM

## 2020-01-27 DIAGNOSIS — G60.9 CHRONIC IDIOPATHIC AXONAL POLYNEUROPATHY: ICD-10-CM

## 2020-01-27 DIAGNOSIS — M25.632 STIFFNESS OF LEFT WRIST JOINT: ICD-10-CM

## 2020-01-27 PROCEDURE — 97018 PARAFFIN BATH THERAPY: CPT | Mod: PO,59

## 2020-01-27 PROCEDURE — 97140 MANUAL THERAPY 1/> REGIONS: CPT | Mod: PO

## 2020-01-27 PROCEDURE — 97032 APPL MODALITY 1+ESTIM EA 15: CPT | Mod: PO

## 2020-01-27 NOTE — PROGRESS NOTES
Occupational Therapy Daily Treatment Note     Date: 1/27/2020  Name: Sincere Riggins  Clinic Number: [id number]    Therapy Diagnosis:   Encounter Diagnoses   Name Primary?    Left arm pain     Stiffness of left wrist joint     Muscle weakness     Chronic idiopathic axonal polyneuropathy      Physician: Vadim Harp Jr*    Physician Orders: Eval & Treat, post op care  Medical Diagnosis: L CTR & CuTR  Surgical Procedure and Date:  L CTR & CuTR, 06/07/19  Evaluation Date: 06/27/19  Insurance Authorization Period Expiration: 12/31/2020    Visit # / Visits authorized: 41 total  Re-assess & Update Plan of Care at next visit  Time In: 1:15 pm  Time Out:  2:00 pm  Total Billable Time: 45 minutes (P, 1MT, 1E-stim)    Precautions:  Standard    Subjective     Pt reports: she completed a RTD appointment since last OT treatment. It was recommended that patient continue to address strengthening.    she was compliant with home exercise program.   Response to previous treatment: decreased pain and increased AROM   Functional change: able to hold small light objects, able to make a loose composite fist    Pain:  1/10 pre-tx; 0-1/10 at night   Location: left elbow/wrist/hand      Objective     Sincere received the following supervised modalities after being cleared for contradictions for 10 minutes:   -Paraffin w/ MHP to L wrist/ hand with all digits in composite fist using Coban wrap, pre-tx to decrease pain & increase tissue extensibility    Sincere received the following direct care modalities after being cleared for contraindication for 15 minutes:  -Functional E-stim: FES/NMES to L hand intrinsic muscles, ch 1 L thumb add, intensity 20 and ch 2 SF Abd at intensity 35      Sincere received manual therapy for 12 minutes, as follows:  -Edema mgmt w/ lymphatic drainage technique;  Deep STM, including MFR, CFM, TPR & scar mgmt to L elbow/forearm/wrist/hand, using hand techniques and IASTM tools to increase blood flow/circulation,  improve soft tissue pliability and decrease pain.  -Joint mobilizations: L RF & SF PIP, al planes of mobility, grade 2-3, including distractions     Sincere received therapeutic exercises for 8 minutes including:    Isospheres 3 min   9 Peg  3 trial, remove/replace         Home Exercises and Education Provided     Education provided:   -Reviewed HEP     Written Home Exercises Provided: Patient instructed to cont prior HEP.  Exercises were reviewed and Sincere was able to demonstrate them prior to the end of the session.  Sincere demonstrated good  understanding of the HEP provided.     See EMR under Patient Instructions for exercises provided prior visit.      Assessment     Patient continues with fair to good activation of L hand intrinsic muscles, including thumb & SF Abd.  She is also noted to have an improvement in scar & fibrotic tissue in her hypothenar eminence.  Deficits in strength and coordination continue and is expected to improve over time as the nerve regenerates.  Recommend to re-assess next visit.       Anticipated barriers to occupational therapy: pain    Pt's spiritual, cultural and educational needs considered and pt agreeable to plan of care and goals.    Goals:  Short Term Goals:   1) Patient will be independent in HEP-met  2) Decrease pain in L elbow/wrist/hand to no more than 4-5/10 worst in ADL/IADL's-met  3) Increase AROM in L wrist Extension by 10 degrees for improved functioning in ADL/IADL's-met  4) Assess  & pinch strength-met  5) Decrease edema in L wrist by .2-.3 cm-met   6) Increase L IF-SF COSTA's by 10-15 degrees for increased functional use of hand in ADL/IADL's-met     Long Term Goals:   1) Decrease pain in L elbow/wrist/hand to no more than 1-2/10 worst in ADL/IADL's-part met   2) Increase AROM of L elbow/wrist/hand to WFL for increased functioning in ADL/IADL's-met  3) Increase strength in L  & pinch by 20% of initial measures for improved functioning in ADL/IADL's-ongoing  4)  Increased functioning in ADL/IADL's, as evidenced by a FOTO impairment rating of no more than 35%-part met  5) Decrease edema in L elbow/wrist/hand to trace or none-met    Plan     Updated Certification Period: 11/14/19 to 01/14/20    Recommended Treatment Plan: 1-2 times per week for 8 weeks: Electrical Stimulation NMES/IFC, Manual Therapy, Moist Heat/ Ice, Neuromuscular Re-ed, Orthotic Management and Training, Paraffin, Patient Education, Therapeutic Activites, Therapeutic Exercise and Ultrasound    Other Recommendations: continued orthotic program for L RF & SF PIP extension    Joseph Carlin, OT

## 2020-02-05 ENCOUNTER — TELEPHONE (OUTPATIENT)
Dept: NEUROLOGY | Facility: CLINIC | Age: 60
End: 2020-02-05

## 2020-02-06 ENCOUNTER — CLINICAL SUPPORT (OUTPATIENT)
Dept: REHABILITATION | Facility: HOSPITAL | Age: 60
End: 2020-02-06
Payer: COMMERCIAL

## 2020-02-06 DIAGNOSIS — M79.602 LEFT ARM PAIN: ICD-10-CM

## 2020-02-06 DIAGNOSIS — M25.632 STIFFNESS OF LEFT WRIST JOINT: ICD-10-CM

## 2020-02-06 DIAGNOSIS — M62.81 MUSCLE WEAKNESS: ICD-10-CM

## 2020-02-06 DIAGNOSIS — G60.9 CHRONIC IDIOPATHIC AXONAL POLYNEUROPATHY: ICD-10-CM

## 2020-02-06 PROCEDURE — 97110 THERAPEUTIC EXERCISES: CPT | Mod: PO

## 2020-02-06 PROCEDURE — 97032 APPL MODALITY 1+ESTIM EA 15: CPT | Mod: PO

## 2020-02-06 PROCEDURE — 97018 PARAFFIN BATH THERAPY: CPT | Mod: PO

## 2020-02-06 NOTE — PROGRESS NOTES
Outpatient Therapy Updated Plan of Care     Visit Date: 2/6/2020  Name: Sincere Riggins  Clinic Number: [id number]    Therapy Diagnosis:   Encounter Diagnoses   Name Primary?    Left arm pain     Stiffness of left wrist joint     Muscle weakness     Chronic idiopathic axonal polyneuropathy      Physician: Vadim Harp Jr*    Physician Orders: Eval & Treat, post op care  Medical Diagnosis: L CTR & CuTR  Surgical Procedure and Date:  L CTR & CuTR, 06/07/19  Evaluation Date: 06/27/19    Total Visits Received: 42  Cancelled Visits: 1  No Show Visits: 1    Current Certification Period:  01/14/20 to 03/14/20  Precautions:  Standard  Functional Level Prior to Evaluation:  Independent    Subjective     Update: Patient reports she is having increased sensation in her L SF.  She continues to report good compliance with HEP, however, has not been wearing her LMB orthotics consistently.        Objective     Update:   Observation/Appearance:  minimal joint stiffness in L RF & SF PIP in extension, without PIP joint capsular tightness.  Trace scar tissue present at surgical sites.  Patient continues with moderate to significant intrinsic muscle atrophy in her L hand, but this continues to improve      Edema. Measured in centimeters.    6/27/2019 6/27/2019 10/28/19 12/19/19     Left Right Left Left   Elbow Crease 24.5 23.2 23.5 (-1.0) 24.0   Wrist Crease 15.6 15.0 15.0 (-.6) 14.9   Mid palm 18.7 19.8 18.0 (-.7) 17.0   MCPs 18.5 18.8 18.6 (+.1) 18.0      ROM:   06/27/19 10/28/19 12/19/19     Left  Left Left   Elbow E/F -20/149 -15/150 -10/150   Forearm Sup/pron WFL/WFL WNL/WNL WNL/WNL   Wrist E/F 56/65 62 (+6) /71 (+6) 67/75   Wrist RD/UD 21/25 20/35 (+10) 20/39         Hand ROM. Measured in degrees.    6/27/2019 10/28/19 12/19/19 02/06/20     Left Left Left Left             Index: MP  0/71 0/85 0/86               PIP     0/94 0/110 0/108               DIP 0/55 0/54 0/63               COSTA 220 249 (+29) 257 (+8) WNL             Long:  MP 0/84 0/90 0/92               PIP 0/96 0/105 0/108               DIP 0/33 0/50 0/57               COSTA 213 245 (+32) 257 (+12) WNL            Ring:   MP 0/80 0/95 0/97               PIP -25/87 -24/106 -20/106               DIP 0/31 0/56 0/51               COSTA 173 233 (+60) 234 (+1) WFL            Small:  MP 0/71 0/85 0/88 0/94               PIP -35/75 -35/96 -26/104 -30/100               DIP 0/34 0/69 0/62 0/70              COSTA 145 215 (+70) 228 (+13) 234 (+6)      Sensation  Patient reports improved sensation in L wrist/hand and RF, but still has numbness and tingling from MCP to tip of  L SF      Strength (Dyanmometer) and Pinch Strength (Pinch Gauge)  Measured in pounds and psi.     10/28/19 10/28/19 12/19/19 02/06/20     Left Right Left Left   Rung II 20 45 23 (+3) 30 (+7)   Key Pinch 2 8 2 (=) 3.0 (+1)   3pt Pinch 1 8 1 (=) 1.5 (+.5)   2pt Pinch .5 3 1 (+.5) 1 (=)     Assessment     Update: Patient continues to make steady progress towards increased strength in L hand, however, continues to have deficits affecting her functioning in ADL/IADL's.  Nerve pain has decreased too minimal.  She continues to have good potential for further improvement with continued OT services.     Goals:  Short Term Goals:   1) Patient will be independent in HEP-met  2) Decrease pain in L elbow/wrist/hand to no more than 4-5/10 worst in ADL/IADL's-met  3) Increase AROM in L wrist Extension by 10 degrees for improved functioning in ADL/IADL's-met  4) Assess  & pinch strength-met  5) Decrease edema in L wrist by .2-.3 cm-met   6) Increase L IF-SF COSTA's by 10-15 degrees for increased functional use of hand in ADL/IADL's-met     Long Term Goals:   1) Decrease pain in L elbow/wrist/hand to no more than 1-2/10 worst in ADL/IADL's-met   2) Increase AROM of L elbow/wrist/hand to WFL for increased functioning in ADL/IADL's-met  3) Increase strength in L  & pinch by 20% of initial measures for improved functioning in  ADL/IADL's-ongoing  4) Increased functioning in ADL/IADL's, as evidenced by a FOTO impairment rating of no more than 35%-part met  5) Decrease edema in L elbow/wrist/hand to trace or none-met    Previous Short Term Goals Status:   6/6 STG's met; STG's=LTG's  New Short Term Goals Status:   Continue with unmet LTG's   Long Term Goal Status:   continue per initial plan of care.  Reasons for Recertification of Therapy:   Updated Plan of Care needed    Plan     Updated Certification Period:  01/14/20 03/14/20  Recommended Treatment Plan: 1-2 times per month for 4 months: Electrical Stimulation NMES/IFC, Manual Therapy, Moist Heat/ Ice, Neuromuscular Re-ed, Orthotic Management and Training, Paraffin, Patient Education, Therapeutic Activites, Therapeutic Exercise and Ultrasound  Other Recommendations: continued orthotic program for L RF & SF PIP extension    Joseph Carlin OT  2/6/2020      I CERTIFY THE NEED FOR THESE SERVICES FURNISHED UNDER THIS PLAN OF TREATMENT AND WHILE UNDER MY CARE    Physician's comments:        Physician's Signature: ___________________________________________________          Occupational Therapy Daily Treatment Note     Date: 2/6/2020  Name: Sincere Riggins  Clinic Number: [id number]    Therapy Diagnosis:   Encounter Diagnoses   Name Primary?    Left arm pain     Stiffness of left wrist joint     Muscle weakness     Chronic idiopathic axonal polyneuropathy      Physician: Vadim Harp Jr*    Physician Orders: Eval & Treat, post op care  Medical Diagnosis: L CTR & CuTR  Surgical Procedure and Date:  L CTR & CuTR, 06/07/19  Evaluation Date: 06/27/19  Insurance Authorization Period Expiration: 12/31/2020    Visit # / Visits authorized: 42 total    Time In: 1:10 pm  Time Out:  2:00 pm  Total Billable Time: 50 minutes (P, 1TE, 1E-stim)    Precautions:  Standard    Subjective     Pt reports: she completed a RTD appointment since last OT treatment. It was recommended that patient continue  to address strengthening.    she was compliant with home exercise program.   Response to previous treatment: decreased pain and increased AROM   Functional change: able to hold small light objects, able to make a loose composite fist    Pain:  1/10 pre-tx; 0-1/10 at night   Location: left elbow/wrist/hand      Objective     Sincere received the following supervised modalities after being cleared for contradictions for 10 minutes:   -Paraffin w/ MHP to L wrist/ hand with all digits in composite fist using Coban wrap, pre-tx to decrease pain & increase tissue extensibility    Sincere received the following direct care modalities after being cleared for contraindication for 20 minutes:  -Accu-pen used for functional E-stim: FES/NMES to L hand intrinsics (various motor points) at 10 Hz, sensitivity 4-5 and intensity 6-8 for 10 minutes  -Functional E-stim: FES/NMES to L hand intrinsic muscles, ch 1 L thumb ABD, intensity 17 and ch 2 SF ADD at intensity 23     Sincere received therapeutic exercises for 20 minutes including:    Objective measures taken today, see above for details    Isospheres 3 min   9 Peg  3 trial, remove/replace         Home Exercises and Education Provided     Education provided:    -Continuation of orthotics program  -Reviewed HEP     Written Home Exercises Provided: Patient instructed to cont prior HEP.  Exercises were reviewed and Sincere was able to demonstrate them prior to the end of the session.  Sincere demonstrated good  understanding of the HEP provided.     See EMR under Patient Instructions for exercises provided prior visit.      Assessment     See above assessment for details    Anticipated barriers to occupational therapy: pain    Pt's spiritual, cultural and educational needs considered and pt agreeable to plan of care and goals.      Plan     Updated Certification Period:  01/14/20 03/14/20    Recommended Treatment Plan: 1-2 times per month for 4 months: Electrical Stimulation NMES/IFC, Manual  Therapy, Moist Heat/ Ice, Neuromuscular Re-ed, Orthotic Management and Training, Paraffin, Patient Education, Therapeutic Activites, Therapeutic Exercise and Ultrasound    Other Recommendations: continued orthotic program for L RF & SF PIP extension        Joseph Carlin, OT

## 2020-02-10 ENCOUNTER — OFFICE VISIT (OUTPATIENT)
Dept: NEUROLOGY | Facility: CLINIC | Age: 60
End: 2020-02-10
Payer: COMMERCIAL

## 2020-02-10 VITALS
BODY MASS INDEX: 21.93 KG/M2 | WEIGHT: 128.44 LBS | HEART RATE: 74 BPM | SYSTOLIC BLOOD PRESSURE: 141 MMHG | RESPIRATION RATE: 18 BRPM | HEIGHT: 64 IN | DIASTOLIC BLOOD PRESSURE: 84 MMHG

## 2020-02-10 DIAGNOSIS — G56.21 ULNAR NEUROPATHY OF RIGHT UPPER EXTREMITY: ICD-10-CM

## 2020-02-10 DIAGNOSIS — G60.9 CHRONIC IDIOPATHIC AXONAL POLYNEUROPATHY: Primary | ICD-10-CM

## 2020-02-10 PROCEDURE — 3008F PR BODY MASS INDEX (BMI) DOCUMENTED: ICD-10-PCS | Mod: CPTII,S$GLB,, | Performed by: PSYCHIATRY & NEUROLOGY

## 2020-02-10 PROCEDURE — 99999 PR PBB SHADOW E&M-EST. PATIENT-LVL III: CPT | Mod: PBBFAC,,, | Performed by: PSYCHIATRY & NEUROLOGY

## 2020-02-10 PROCEDURE — 99999 PR PBB SHADOW E&M-EST. PATIENT-LVL III: ICD-10-PCS | Mod: PBBFAC,,, | Performed by: PSYCHIATRY & NEUROLOGY

## 2020-02-10 PROCEDURE — 99215 OFFICE O/P EST HI 40 MIN: CPT | Mod: S$GLB,,, | Performed by: PSYCHIATRY & NEUROLOGY

## 2020-02-10 PROCEDURE — 99215 PR OFFICE/OUTPT VISIT, EST, LEVL V, 40-54 MIN: ICD-10-PCS | Mod: S$GLB,,, | Performed by: PSYCHIATRY & NEUROLOGY

## 2020-02-10 PROCEDURE — 3008F BODY MASS INDEX DOCD: CPT | Mod: CPTII,S$GLB,, | Performed by: PSYCHIATRY & NEUROLOGY

## 2020-02-10 NOTE — PATIENT INSTRUCTIONS
Gabapentin can often work better when taken at consistent dosing.  Choose a dose that seems to cover your symptoms and stick with it.    You have idiopathic peripheral neuropathy.  This has a very low likelihood of leading to long term disability.  It will progress slowly with time, but remaining active will minimize its affects.      Stay active with walking.  Balance activities such as Harinder Chi, Yoga and Ball work can all be beneficial.    Follow up in 1 year.

## 2020-02-10 NOTE — LETTER
February 10, 2020      Nicole Ruth MD  [address]  [facility] [address]           [facility]  [address] [place] [address]  Phone: [phone]  Fax: [phone]          Patient: Sincere Riggins   MR Number: [id number]   YOB: 1960   Date of Visit: 2/10/2020       Dear Dr. Nicole Ruth:    Thank you for referring Sincere Riggins to me for evaluation. Attached you will find relevant portions of my assessment and plan of care.    If you have questions, please do not hesitate to call me. I look forward to following Sincere Riggins along with you.    Sincerely,    Adriana Rebollar, DO    Enclosure  CC:  No Recipients    If you would like to receive this communication electronically, please contact [email or web address] or [phone] to request more information on [employer] Link access.    For providers and/or their staff who would like to refer a patient to [facility], please contact us through our one-stop-shop provider referral line, [facility], at [phone].    If you feel you have received this communication in error or would no longer like to receive these types of communications, please e-mail [email or web address]

## 2020-02-10 NOTE — PROGRESS NOTES
NEUROLOGY  Outpatient CONSULT    [facility]  [address], [place] [address]  [phone] (office) / [phone] (fax)    Patient Name:  Sincere Riggins  :  1960  MR #:  [id number]  Acct #:  [id number]    Date of Neurology Consult: 02/10/2020  Name of Neurologist: Adriana Rebollar D.O, ABPN, AOBNP, ABEM    Other Physicians:  Erasto Morgan MD (Primary Care Physician); Nicole Ruth MD (Referring)      Chief Complaint: Peripheral Neuropathy (neuropathy BLE; ref per Dr. Rojas)      History of Present Illness (HPI):  Sincere Riggins is a 59 y.o. female referred by Dr. Ruth for consultation regarding neuropathy.    Patient states she has had neuropathy in her feet for 10 or more years.  She has been using special footwear and following with podiatry.  Over time it became worse. She was seen by neurology starting in Oct 2018.  She had an MRI of her spine and bloodwork. This ruled out some possible causes.  [facility].  In 2019 she had some trouble with her left hand. She saw a hand specialist for carpal tunnel surgery and cubital tunnel surgery. She is still trying to heal from this.  In the fall of  she was taking Gabapentin for abnormal sensation.  Her toes still felt like marbles.  She started getting heel pain and arch pain. She was having trouble walking.  [facility]  MRI lumbar spine didn't show any good explanation. She was sent to Dr. Joyce for an EMG.  She was then sent back to Dr. Ruth who sent her here.     She is currently on Gabapentin.  She is taking variable doses every day.  She may take 600mg one day and 2800mg another day.  She is not having side effects with this.    Treatment to date:    Gabapentin 300mg - variable dosing currently    Review of Systems:   General: Weight gain: No, Weight Loss: No, Fatigue: No,   Fever: No, Chills: No, Night  Sweats: No, Insomnia: No, Excessive sleeping: No   Respiratory:  Cough: No, Shortness of Breath: No,   Wheezing: No, Excessive Snoring: No, Coughing up blood: No  Endocrine: Heat Intolerance: No, Cold Intolerance: No,   Excessive Thirst: No, Excessive Hunger: No,   Eyes:  Blurred Vision: No, Double Vision: No,   Light Sensitivity: No, Eye pain: No  Musculoskeletal: Muscle Aches/Pain: No, Joint Pain/Swelling: No, Muscle Cramps: Yes, Muscle Weakness: No, Neck Pain: No, Back Pain: No   Neurological: Difficulty Walking/Falls: No, Headache Migraine: No, Dizziness/Vertigo: No, Fainting: No, Difficulty with Speech: No, Weakness: No, Tingling/Numbness: Yes, Tremors: No, Memory Problems: No, Seizures: No, Difficulty Swallowing: No, Altered Taste: No.  Cardiovascular: Chest Pain: No, Shortness of Breath: No,   Palpitations: No,  Gastrointestinal: Nausea/Vomiting: No, Constipation: No, Diarrhea: No, Bloody Stools: No   Psych/Cog:  Depression: No, Anxiety: No, Hallucinations: No, Problems Concentrating: No  : Frequent Urination: No, Incontinence: No, Blood of Urine: No, Urinary Infections: No, Changes in Sex Drive: No   ENT:Hearing Loss: No, Earache: No, Ringing in Ears: No,   Facial Pain: No, Chronic Congestion: No   Immune: Seasonal Allergies: No, Hives and/or Rashes: No  The remainder of the review of twelve body systems was reviewed and normal.    Past Medical, Surgical, Family & Social History:   Past Medical History:   Diagnosis Date    Carpal tunnel syndrome     Hypercholesteremia      Past Surgical History:   Procedure Laterality Date    CARPAL TUNNEL RELEASE Left 6/7/2019    Procedure: RELEASE, CARPAL TUNNEL;  Surgeon: Vadim Harp Jr., MD;  Location: [facility] OR;  Service: Plastics;  Laterality: Left;    COLONOSCOPY      NERVE TRANSFER Left 6/7/2019    Procedure: TRANSPOSITION, NERVE ulnar;  Surgeon: Vadim Harp Jr., MD;  Location: [facility] OR;  Service: Plastics;  Laterality: Left;    WISDOM TOOTH  "EXTRACTION       Family History   Problem Relation Age of Onset    Alzheimer's disease Mother     Diabetes Father     No Known Problems Maternal Grandmother     Leukemia Maternal Grandfather     Heart disease Paternal Grandmother     Emphysema Paternal Grandfather      Alcohol use:  reports that she drinks alcohol.   (Of note, 0.6 oz = 1 beer or 6 oz = 10 beers).  Tobacco use:  reports that she has never smoked. She has never used smokeless tobacco.  Street drug use:  has no drug history on file.  Allergies: Patient has no known allergies..    Home Medications:     Current Outpatient Medications:     calcium carbonate (OS-BRITTANY) 500 mg calcium (1,250 mg) tablet, Take 1 tablet by mouth once daily., Disp: , Rfl:     estradiol (VIVELLE-DOT) 0.0375 mg/24 hr, 1 patch every 7 days. , Disp: , Rfl:     fish oil-omega-3 fatty acids 300-1,000 mg capsule, Take by mouth once daily., Disp: , Rfl:     gabapentin (NEURONTIN) 300 MG capsule, 2 daily, Disp: 180 capsule, Rfl: 3    medroxyPROGESTERone (PROVERA) 5 MG tablet, once daily. , Disp: , Rfl:     multivitamin capsule, Take 1 capsule by mouth once daily., Disp: , Rfl:     pravastatin (PRAVACHOL) 10 MG tablet, , Disp: , Rfl:     temazepam (RESTORIL) 15 mg Cap, Take 15 mg by mouth nightly as needed. , Disp: , Rfl: 2    vitamin D 1000 units Tab, Take 1,000 Units by mouth once daily., Disp: , Rfl:     Physical Examination:  BP (!) 141/84   Pulse 74   Resp 18   Ht 5' 4" (1.626 m)   Wt 58.3 kg (128 lb 6.7 oz)   BMI 22.04 kg/m²     GENERAL:  General appearance: Well, non-toxic appearing.  No apparent distress.  Fundi exam: normal.  Neck: supple.  Carotid auscultation: normal.  Heart auscultation: normal.  Peripheral pulses: normal.  Extremities: normal.    MENTAL STATUS:  Alertness, attention span & concentration: normal.  Language: normal.  Orientation to self, place & time:  normal.  Memory, recent & remote: normal.  Fund of knowledge: normal.    SPEECH:  Clear and " fluent.  Follows complex commands.    CRANIAL NERVES:  Cranial Nerves II-XII were examined.  II - Visual fields: normal.  III, IV, VI: PERRL, EOMI, No ptosis, No nystagmus.  V - Facial sensation: normal.  VII - Face symmetry & mobility: normal.  VIII - Hearing: normal.  IX, X - Palate: mobile & midline.  XI - Shoulder shrug: normal.  XII - Tongue protrusion: normal.    GROSS MOTOR:  Gait & station: normal.  Tone: normal.  Abnormal movements: none.  Finger-nose & Heel-knee-shin: normal.  Rapid alternating movements & drift: normal.  Romberg: absent    MUSCLE STRENGTH:     Fascics Atrophy RIGHT    LEFT Atrophy Fascics     5 Neck Ext. 5       5 Neck Flex 5       5 Deltoids 5       5 Sh.Ext.Rot. 5       5 Sh.Int.Rot. 5       5 Biceps 5       5 Triceps 5       5 Forearm.Pr. 5       5 Wrist Ext. 5       5 Wrist Flex 5       5 Finger Ext. 5       5 Finger Flex 5       5 FPL 5      + 3 Inteross. 3 +                        5 Iliopsoas 5       5 Hip Abduct 5       5 Hip Adduct 5       5 Quads 5       5 Hams 5       5 Dorsiflex 5       5 Plantar Flex 5       5 Ankle Richard 5       5 Ankle Invert 5       5 Toe Ext. 5       5 Toe Flex 5                         REFLEXES:    RIGHT Reflex   LEFT   2+ Biceps 2+   2+ Brachiorad. 2+   2+ Triceps 2+        2+ Patellar 2+   0 Ankle 0        Down PLANTAR Down     SENSORY:  Sharp touch: lost in toes and distal foot, diminished midfoot, normal proximal to ankle; also with numbness of right 4th and 5th digits.    Diagnostic Data Reviewed:     EMG 2019 BUE: left ulnar neuropathy, bilateral median neuropathy    EMG 2019 BLE:  Axonal peripheral neuropathy    Component      Latest Ref Rng & Units 7/17/2018   Protein, Serum      6.0 - 8.4 g/dL 7.0   Albumin grams/dl      3.35 - 5.55 g/dL 4.55   Alpha-1 grams/dl      0.17 - 0.41 g/dL 0.29   Alpha-2 grams/dl      0.43 - 0.99 g/dL 0.70   Beta grams/dl      0.50 - 1.10 g/dL 0.69   Gamma grams/dl      0.67 - 1.58 g/dL 0.78   Lyme Ab      <0.90 Index  Value 0.02   Copper      810 - 1990 ug/L 1336   CERULOPLASMIN      15.0 - 45.0 mg/dL 28.0   TSH      0.400 - 4.000 uIU/mL 0.939   Free T4      0.71 - 1.51 ng/dL 0.98   RPR      Non-reactive Non-reactive   Immunofix Interp.       SEE COMMENT   TAMARA Screen      Negative <1:160 Negative <1:160   Sed Rate      0 - 20 mm/Hr 13   CRP      0.0 - 8.2 mg/L 0.5   Folate      4.0 - 24.0 ng/mL 16.5   Selenium      23 - 190 ug/L 103   Retinol, serum      38 - 106 ug/dL 48   Thiamine      38 - 122 ug/L 62   Vitamin B-12      180 - 914 ng/L 704   Vitamin B2      1 - 19 mcg/L 7   Vitamin B6      5 - 50 ug/L 25   Vit D, 25-Hydroxy      30 - 96 ng/mL 38   Vitamin E      500 - 1800 ug/dL 1550   Zinc, Serum-ALT      60 - 130 ug/dL 99   Homocysteine      4.0 - 15.5 umol/L 5.8   Pathologist Interpretation CASSIUS       Normal   Pathologist Interpretation SPE       Normal       Assessment and Plan:  Sincere Riggins is a 59 y.o., right handed woman with recent carpal tunnel and cubital tunnel surgery on the left now with complaints of recent worsening of peripheral neuropathy.  She reports a transient period of right heel and arch pain.  This was worse when bearing weight.  Doubtful this was due to peripheral neuropathy, suspect this may have been plantar fasciitis.  She does have confirmed neuropathy in both lower extremities. She is currently pain free, but notes a symmetric numbness in the feet.  On exam she has intact strength and reflexes with very distal sensory loss.      Her findings are most consistent with idiopathic peripheral polyneuropathy. We reviewed that she has had testing looking for alternative sources of neuropathy and these have been negative. She does not have features of hereditary or auto-immune neuropathy on exam or EMG.  There is no intervention other than pain control as needed.  She prefers taking gabapentin for occasional pain.  Advised she take this more consistently versus as needed where it will be less  effective.    We reviewed the diagnosis of idiopathic neuropathy. We reviewed that complications tend to be trouble with balance.  We reviewed that regular physical activity is a great way to prevent falls and weakness in the setting of neuropathy.      No new testing or intervention today.  She can try taking one Gabapentin in the morning and 3 at night on a consistent basis and see what she thinks.    She has symptoms suggestive of a right ulnar neuropathy.  Her last EMG did not show signs of this.  She is going to follow up with her doctor and is expecting Dr. Joyce will be asked to EMG her right arm again.      The patient will return to clinic in 12 months.    Important to note, also  has a past medical history of Carpal tunnel syndrome and Hypercholesteremia.           Adriana Rebollra D.O, ABPN, AOBNP, ABEM    This note was created with voice recognition software.  Grammatical, syntax and spelling errors may be inevitable.

## 2020-02-12 ENCOUNTER — PROCEDURE VISIT (OUTPATIENT)
Dept: PHYSICAL MEDICINE AND REHAB | Facility: CLINIC | Age: 60
End: 2020-02-12
Payer: COMMERCIAL

## 2020-02-12 ENCOUNTER — OFFICE VISIT (OUTPATIENT)
Dept: ORTHOPEDICS | Facility: CLINIC | Age: 60
End: 2020-02-12
Payer: COMMERCIAL

## 2020-02-12 VITALS
BODY MASS INDEX: 21.85 KG/M2 | SYSTOLIC BLOOD PRESSURE: 127 MMHG | DIASTOLIC BLOOD PRESSURE: 76 MMHG | WEIGHT: 128 LBS | HEIGHT: 64 IN | HEART RATE: 80 BPM

## 2020-02-12 DIAGNOSIS — G56.21 CUBITAL TUNNEL SYNDROME ON RIGHT: ICD-10-CM

## 2020-02-12 DIAGNOSIS — G56.01 RIGHT CARPAL TUNNEL SYNDROME: ICD-10-CM

## 2020-02-12 DIAGNOSIS — G56.01 RIGHT CARPAL TUNNEL SYNDROME: Primary | ICD-10-CM

## 2020-02-12 PROCEDURE — 3008F PR BODY MASS INDEX (BMI) DOCUMENTED: ICD-10-PCS | Mod: CPTII,S$GLB,, | Performed by: PLASTIC SURGERY

## 2020-02-12 PROCEDURE — 99999 PR PBB SHADOW E&M-EST. PATIENT-LVL III: ICD-10-PCS | Mod: PBBFAC,,, | Performed by: PLASTIC SURGERY

## 2020-02-12 PROCEDURE — 95912 NRV CNDJ TEST 11-12 STUDIES: CPT | Mod: S$GLB,,, | Performed by: PHYSICAL MEDICINE & REHABILITATION

## 2020-02-12 PROCEDURE — 3008F BODY MASS INDEX DOCD: CPT | Mod: CPTII,S$GLB,, | Performed by: PLASTIC SURGERY

## 2020-02-12 PROCEDURE — 99212 PR OFFICE/OUTPT VISIT, EST, LEVL II, 10-19 MIN: ICD-10-PCS | Mod: S$GLB,,, | Performed by: PLASTIC SURGERY

## 2020-02-12 PROCEDURE — 99999 PR PBB SHADOW E&M-EST. PATIENT-LVL III: CPT | Mod: PBBFAC,,, | Performed by: PLASTIC SURGERY

## 2020-02-12 PROCEDURE — 95886 MUSC TEST DONE W/N TEST COMP: CPT | Mod: S$GLB,,, | Performed by: PHYSICAL MEDICINE & REHABILITATION

## 2020-02-12 PROCEDURE — 95886 PR EMG COMPLETE, W/ NERVE CONDUCTION STUDIES, 5+ MUSCLES: ICD-10-PCS | Mod: S$GLB,,, | Performed by: PHYSICAL MEDICINE & REHABILITATION

## 2020-02-12 PROCEDURE — 95912 PR NERVE CONDUCTION STUDY; 11 -12 STUDIES: ICD-10-PCS | Mod: S$GLB,,, | Performed by: PHYSICAL MEDICINE & REHABILITATION

## 2020-02-12 PROCEDURE — 99212 OFFICE O/P EST SF 10 MIN: CPT | Mod: S$GLB,,, | Performed by: PLASTIC SURGERY

## 2020-02-12 NOTE — PROCEDURES
[id number]  Test Date:  2020    Patient: Sincere Riggins : 1960 Physician: Fredrick Joyce D.O.   ID#:  SEX: Female Ref. Phys: Vadim Harp Jr*     HPI: Sincere Riggins is a 59 y.o.female who presents for NCS/EMG to evaluate right hand numbness/tingling in the ulnar distribution.  Note that NCS/EMG was done of the UE in may of 2019 and of the LE in Dec of 2019.  The previous UE EMG showed CTS bilaterally (moderate) and left ulnar neuropathy at the elbow.  She had a CTR and ulnar nerve transposition on the left, and her symptoms have been improving.  The symptoms of right 4th and 5th digit numbness/tingling began around August and have been progressing.      NCV & EMG Findings:   The right ulnar motor nerve with FDI pickup showed a drop in amplitude between the below elbow and the wrist stimulations of about 55%.  Inching was done to try and find location below the elbow, and there is a drop in amplitude between 4 and 6 cm from the below elbow stimulations (8cm roughly distal to the olecranon)   The left ulnar motor showed prolonged distal latency and decrease in CMAP amplitude   The bilateral median motor nerves showed prolonged wrist latencies, the left with mild decrease in CMAP amplitude at the wrist and elbow   The bilateral median sensory nerves showed prolonged latencies, and the left showed mild decrease in SNAP ampltiude   The left ulnar sensory nerve showed no response   The right ulnar sensory nerve showed slightly decreased SNAP amplitude   The bilateral LUIS nerves showed no response   All remaining nerve conduction studies (as indicated in the following tables) were within normal limits.   Needle evaluation of the right First Dorsal Interosseous and the right Abductor Digiti Minimi muscles showed increased motor unit amplitude and diminished recruitment.   All remaining muscles (as indicated in the following table) showed no evidence of electrical instability.    Impression:  1.  There is evidence to suggest a right ulnar neuropathy.  In terms of localization, this is in the forearm, distal to the branches to the FCU and ulnar FDP, but proximal to the dorsal ulnar cutaneous branch.  This is affecting the superficial ulnar branch and the dorsal ulnar cutaneous branch, as well as the branches to the FDI and ADM. This is new since the May 2019 study.  Although inching technique isn't exact, there does appear to be a drop in amplitude roughly 8-10 cm distal to the olecranon, which may be the site of impingement.  There are chronic neuropathic changes seen in the ADM and first dorsal interosseus muscles without any active/ongoing denervation.  Ulnar neuropathies in the forearm are less common, and so advanced imaging (MRI or US) to further evaluate the nerve and possible sites of impingement may be helpful.       2. There is again noted a bilateral sensorimotor median mononeuropathies across the wrist (I.e. Carpal tunnel syndrome).  There is mild motor axonal loss on the left, none on the right.  There is no active denervation on the right.  This is graded as moderate on the left and moderate-severe on the right.  When compared to the 5/2019 study, there is slight improvement in wrist latencies bilaterally, however there is a decrease in CMAP amplitude on the right, indicative of possible progression on the right.     3. The ulnar motor nerve on the left again showed significantly decreased CMAP amplitudes.  Of note, she has significant atrophy of both her FDI and ADM clinically.  The superficial ulnar sensory and dorsal ulnar cutaneous nerves also show no response, consistent with the known ulnar neuropathy at the elbow.  When compared to the 5/2019 study, the ulnar motor and sensory responses are smaller as above.  There is no longer slowing of the ulnar nerve around the elbow.  Clinically, she has had improvement since the ulnar nerve transposition on the left.         ___________________________  Fredrick Joyce D.O.        Nerve Conduction Studies  Motor Summary Table     Stim Site NR Onset (ms) Norm Onset (ms) O-P Amp (mV) Norm O-P Amp Site1 Site2 Delta-0 (ms) Dist (cm) Collin (m/s) Norm Collin (m/s)   Right Ulnar Motor (FDI)   Wrist    4.0 <4.2 4.3 >3 B Elbow Wrist 4.0 23.0 58 >53   B Elbow    8.0  1.9  A Elbow B Elbow 2.6 14.0 54 >53   A Elbow    10.6  1.8            Motor Inching Summary Table     Stim Site NR Onset (ms) Norm Onset (ms) O-P Amp (mV) Norm O-P Amp Site1 Site2 Delta-0 (ms) Norm Delta (ms) Dist (cm) Collin (m/s) Norm Collin (m/s)   Right Ulnar Motor Inching (FDI)   Wrist    3.8 <4.2 4.9 >3 Elbow Wrist 5.0  0.0  >53   -6 cm    6.6  2.9           -4 cm    6.9  0.4           Elbow    8.8  2.0             NCS+  Motor Nerve Results      Latency Amplitude F-Lat Segment Distance CV Comment   Site (ms) Norm (mV) Norm (ms)  (cm) (m/s) Norm    Left Median (APB)   Palm 1.58 - 4.0 -         Wrist *4.5  < 4.4 *3.5  > 4.2  Wrist-Palm 7 24 -    Elbow 9.4 - 3.1 -  Elbow-Wrist 25 51  > 51    Right Median (APB)   Wrist *4.7  < 4.4 4.8  > 4.2  Wrist-Palm - - -    Elbow 9.7 - 4.1 -  Elbow-Wrist 22 *44  > 51    Left Ulnar (ADM)   Wrist *4.8  < 3.7 *0.23  > 3.0         Bel Elbow 7.6 - 0.25 -  Bel Elbow-Wrist 20 71  > 52    Abv Elbow 10.3 - 0.22 -  Abv Elbow-Bel Elbow 14 52  > 43    Right Ulnar (ADM)   Wrist 3.1  < 3.7 3.3  > 3.0         Bel Elbow 7.2 - 2.2 -  Bel Elbow-Wrist 23 56  > 52    Abv Elbow 9.6 - 1.89 -  Abv Elbow-Bel Elbow 14 58  > 43      Sensory Nerve Results      Latency (Peak) Amplitude (P-P) Segment Distance CV Comment   Site (ms) Norm (µV) Norm  (cm) (m/s) Norm    Left Median   Wrist-Dig II *4.4  < 4.0 *13  > 15 Wrist-Dig II 14 *32  > 39    Right Median   Wrist-Dig II *4.4  < 4.0 17  > 15 Wrist-Dig II 14 *32  > 39    Left Ulnar   Wrist-Dig V *NR  < 4.0 *NR  > 13 Wrist-Dig V 14 *NR  > 38    Right Ulnar   Wrist-Dig V 3.2  < 4.0 *10  > 13 Wrist-Dig V 14 44  > 38    Left  Dorsal Ulnar Cutaneous   Wrist-Dorsum 5th MC *NR - *NR - Wrist-Dorsum 5th MC - *NR -    Right Dorsal Ulnar Cutaneous   Wrist-Dorsum 5th MC *NR - *NR - Wrist-Dorsum 5th MC - *NR -    Right Radial   Forearm-Wrist 1.93  < 2.8 11  > 11 Forearm-Wrist 10 52 -      EMG+     Side Muscle Nerve Root Ins Act Fibs Psw Amp Dur Poly Recrt Int Pat Comment   Right Biceps Musculocut C5-C6 Nml Nml Nml Nml Nml 0 Nml Nml    Right Triceps Radial C6-C8 Nml Nml Nml Nml Nml 0 Nml Nml    Right Pronator Teres Median C6-C7 Nml Nml Nml Nml Nml 0 Nml Nml    Right Brachiorad Radial C5-C6 Nml Nml Nml Nml Nml 0 Nml Nml    Right FDI Ulnar C8-T1 Nml Nml Nml *Incr Nml 0 *Reduced Nml    Right APB Median C8-T1 Nml Nml Nml Nml Nml 0 Nml Nml    Right ADM Ulnar C8-T1 Nml Nml Nml *Incr Nml 0 *Reduced Nml    Right FCU Ulnar C8-T1 Nml Nml Nml Nml Nml 0 Nml Nml    Right Flex dig prof (uln) Ulnar C8-T1 Nml Nml Nml Nml Nml 0 Nml Nml    Right Cervical Parasp (Mid) Rami C4-C6 Nml Nml Nml         Right Cervical Parasp (Lower) Rami C7-C8 Nml Nml Nml               Waveforms:    Motor              Sensory                       Note: ulnar motor to the FDI and ulnar inching graphs do not show up, but data in the tables above is accurate.

## 2020-02-12 NOTE — PROGRESS NOTES
Subjective:      Patient ID: Sincere Riggins is a 59 y.o. female.    Chief Complaint: Numbness of the Right Hand    Sincere Riggins returns to clinic a had a of her scheduled 2 month follow-up with complaints of constant right small finger and half of the ring finger numbness.  She also states that his become difficult to use the right hand as she has begun to develop weakness and loss of dexterity.  She states the numbness extends over the dorsal aspect of her hand.  She has been trying to wear the extension brace at night while sleeping.  She feels the injection she received in December of helped her carpal tunnel symptoms however she continues to have ulnar nerve symptoms.  She is very concerned and would like to move forward with the EMG.      Review of Systems   Musculoskeletal: Negative for stiffness.   Neurological: Positive for numbness and paresthesias.   All other systems reviewed and are negative.        Objective:            General    Vitals reviewed.  Constitutional: She appears well-nourished. No distress.   Pulmonary/Chest: Effort normal.   Neurological: She is alert.   Psychiatric: She has a normal mood and affect.             Right Hand/Wrist Exam     Tests     Atrophy   Hypothenar:  negative  Intrinsic:  negative  1st Dorsal Interosseous: negative    Other     Neuorologic Exam    Median Distribution: normal  Ulnar Distribution: normal  Radial Distribution: abnormal      Right Elbow Exam     Tests   Tinel's sign (cubital tunnel): positive          Vascular Exam       Capillary Refill  Right Hand: normal capillary refill              Assessment:       Encounter Diagnoses   Name Primary?    Right carpal tunnel syndrome Yes    Cubital tunnel syndrome on right           Plan:       Sincere was seen today for numbness.    Diagnoses and all orders for this visit:    Right carpal tunnel syndrome  -     EMG W/ ULTRASOUND AND NERVE CONDUCTION TEST 1 Extremity; Future    Cubital tunnel syndrome on right  -      EMG W/ ULTRASOUND AND NERVE CONDUCTION TEST 1 Extremity; Future     I am concerned for progressing right cubital tunnel syndrome the patient was referred for a stat EMG of the right upper extremity as she has had chronic compression of the left side resulting and atrophy. I would like see the patient back after the EMG is performed discuss the surgical plan for the right upper extremity.  I believe she will need a right ulnar nerve release and possible transposition and carpal tunnel release.  All questions and concerns were addressed she agree with this above assessment plan

## 2020-02-19 ENCOUNTER — OFFICE VISIT (OUTPATIENT)
Dept: ORTHOPEDICS | Facility: CLINIC | Age: 60
End: 2020-02-19
Payer: COMMERCIAL

## 2020-02-19 VITALS
DIASTOLIC BLOOD PRESSURE: 74 MMHG | HEIGHT: 64 IN | SYSTOLIC BLOOD PRESSURE: 122 MMHG | HEART RATE: 65 BPM | WEIGHT: 128 LBS | BODY MASS INDEX: 21.85 KG/M2

## 2020-02-19 DIAGNOSIS — G56.01 RIGHT CARPAL TUNNEL SYNDROME: Primary | ICD-10-CM

## 2020-02-19 DIAGNOSIS — G56.21 CUBITAL TUNNEL SYNDROME ON RIGHT: ICD-10-CM

## 2020-02-19 PROCEDURE — 99999 PR PBB SHADOW E&M-EST. PATIENT-LVL IV: ICD-10-PCS | Mod: PBBFAC,,, | Performed by: PLASTIC SURGERY

## 2020-02-19 PROCEDURE — 99214 OFFICE O/P EST MOD 30 MIN: CPT | Mod: S$GLB,,, | Performed by: PLASTIC SURGERY

## 2020-02-19 PROCEDURE — 3008F BODY MASS INDEX DOCD: CPT | Mod: CPTII,S$GLB,, | Performed by: PLASTIC SURGERY

## 2020-02-19 PROCEDURE — 99999 PR PBB SHADOW E&M-EST. PATIENT-LVL IV: CPT | Mod: PBBFAC,,, | Performed by: PLASTIC SURGERY

## 2020-02-19 PROCEDURE — 3008F PR BODY MASS INDEX (BMI) DOCUMENTED: ICD-10-PCS | Mod: CPTII,S$GLB,, | Performed by: PLASTIC SURGERY

## 2020-02-19 PROCEDURE — 99214 PR OFFICE/OUTPT VISIT, EST, LEVL IV, 30-39 MIN: ICD-10-PCS | Mod: S$GLB,,, | Performed by: PLASTIC SURGERY

## 2020-02-19 RX ORDER — SODIUM CHLORIDE 9 MG/ML
INJECTION, SOLUTION INTRAVENOUS CONTINUOUS
Status: CANCELLED | OUTPATIENT
Start: 2020-02-19

## 2020-02-19 NOTE — H&P (VIEW-ONLY)
Chief Complaint: Pain of the Right Wrist      HPI:    Sincere Riggins is a right hand dominant 59 y.o. female presenting today for   Discussion of her recent EMG.  She continues to have symptoms within the right hand mostly with numbness in the small and ring fingers.  She denies any other significant complaints.  Past Medical History:   Diagnosis Date    Carpal tunnel syndrome     Hypercholesteremia        Past Surgical History:   Procedure Laterality Date    CARPAL TUNNEL RELEASE Left 6/7/2019    Procedure: RELEASE, CARPAL TUNNEL;  Surgeon: Vadim Harp Jr., MD;  Location: [facility] OR;  Service: Plastics;  Laterality: Left;    COLONOSCOPY      NERVE TRANSFER Left 6/7/2019    Procedure: TRANSPOSITION, NERVE ulnar;  Surgeon: Vadim Harp Jr., MD;  Location: [facility];  Service: Plastics;  Laterality: Left;    WISDOM TOOTH EXTRACTION          Family History   Problem Relation Age of Onset    Alzheimer's disease Mother     Diabetes Father     No Known Problems Maternal Grandmother     Leukemia Maternal Grandfather     Heart disease Paternal Grandmother     Emphysema Paternal Grandfather        Social History     Socioeconomic History    Marital status:      Spouse name: Not on file    Number of children: Not on file    Years of education: Not on file    Highest education level: Not on file   Occupational History    Not on file   Social Needs    Financial resource strain: Not on file    Food insecurity:     Worry: Not on file     Inability: Not on file    Transportation needs:     Medical: Not on file     Non-medical: Not on file   Tobacco Use    Smoking status: Never Smoker    Smokeless tobacco: Never Used   Substance and Sexual Activity    Alcohol use: Yes     Comment: occasional     Drug use: Not on file    Sexual activity: Not on file   Lifestyle    Physical activity:     Days per week: Not on file     Minutes per session: Not on file    Stress: Not on file   Relationships  "   Social connections:     Talks on phone: Not on file     Gets together: Not on file     Attends Yazdanism service: Not on file     Active member of club or organization: Not on file     Attends meetings of clubs or organizations: Not on file     Relationship status: Not on file   Other Topics Concern    Not on file   Social History Narrative    Not on file       Review of patient's allergies indicates:  No Known Allergies      Current Outpatient Medications:     calcium carbonate (OS-BRITTANY) 500 mg calcium (1,250 mg) tablet, Take 1 tablet by mouth once daily., Disp: , Rfl:     estradiol (VIVELLE-DOT) 0.0375 mg/24 hr, 1 patch every 7 days. , Disp: , Rfl:     fish oil-omega-3 fatty acids 300-1,000 mg capsule, Take by mouth once daily., Disp: , Rfl:     gabapentin (NEURONTIN) 300 MG capsule, 2 daily, Disp: 180 capsule, Rfl: 3    medroxyPROGESTERone (PROVERA) 5 MG tablet, once daily. , Disp: , Rfl:     multivitamin capsule, Take 1 capsule by mouth once daily., Disp: , Rfl:     pravastatin (PRAVACHOL) 10 MG tablet, , Disp: , Rfl:     temazepam (RESTORIL) 15 mg Cap, Take 15 mg by mouth nightly as needed. , Disp: , Rfl: 2    vitamin D 1000 units Tab, Take 1,000 Units by mouth once daily., Disp: , Rfl:         Review of Systems:  Constitutional: no fever or chills  ENT: no nasal congestion or sore throat  Respiratory: no cough or shortness of breath  Cardiovascular: no chest pain or palpitations  Gastrointestinal: no nausea or vomiting, PUD, GERD, NSAID intolerance  Genitourinary: no hematuria or dysuria  Integument/Breast: no rash or pruritis  Hematologic/Lymphatic: no easy bruising or lymphadenopathy  Musculoskeletal: see HPI  Neurological: no seizures or tremors  Behavioral/Psych: no auditory or visual hallucinations      Objective:      PHYSICAL EXAM:  Vitals:    02/19/20 0902   BP: 122/74   Pulse: 65   Weight: 58.1 kg (128 lb)   Height: 5' 4" (1.626 m)   PainSc: 0-No pain     General Appearance: WDWN, " NAD  Neuro/Psych: Mood & affect appropriate  Lungs: Respirations equal and unlabored.   CV: No apparent CV dysfunction, distal pulses intact, RRR  Skin: Intact throughout  Extremities:   Right Hand Exam     Tests   Tinel's sign (median nerve): positive    Other   Erythema: absent  Sensation: decreased (Decreased sensation ulnar distribution)    Comments:   Weak ulnar intrinsics mild hypo thenar atrophy      Right Elbow Exam     Tenderness   The patient is experiencing no tenderness.     Tests   Tinel's sign (cubital tunnel): positive    Other   Erythema: absent    Comments:   Tinel's over the ulnar nerve extending distal to the olecranon              DIAGNOSTICS/IMAGING:    Radiologist's Impression:     NCV & EMG Findings:  ? The right ulnar motor nerve with FDI pickup showed a drop in   amplitude between the below elbow and the wrist stimulations of   about 55%.  Inching was done to try and find location below the   elbow, and there is a drop in amplitude between 4 and 6 cm from   the below elbow stimulations (8cm roughly distal to the   olecranon)  ? The left ulnar motor showed prolonged distal latency and   decrease in CMAP amplitude  ? The bilateral median motor nerves showed prolonged wrist   latencies, the left with mild decrease in CMAP amplitude at the   wrist and elbow  ? The bilateral median sensory nerves showed prolonged latencies,   and the left showed mild decrease in SNAP ampltiude  ? The left ulnar sensory nerve showed no response  ? The right ulnar sensory nerve showed slightly decreased SNAP   amplitude  ? The bilateral LUIS nerves showed no response  ? All remaining nerve conduction studies (as indicated in the   following tables) were within normal limits.  ? Needle evaluation of the right First Dorsal Interosseous and   the right Abductor Digiti Minimi muscles showed increased motor   unit amplitude and diminished recruitment.  ? All remaining muscles (as indicated in the following table)    showed no evidence of electrical instability.    Impression:  1. There is evidence to suggest a right ulnar neuropathy.  In   terms of localization, this is in the forearm, distal to the   branches to the FCU and ulnar FDP, but proximal to the dorsal   ulnar cutaneous branch.  This is affecting the superficial ulnar   branch and the dorsal ulnar cutaneous branch, as well as the   branches to the FDI and ADM. This is new since the May 2019   study.  Although inching technique isn't exact, there does appear   to be a drop in amplitude roughly 8-10 cm distal to the   olecranon, which may be the site of impingement.  There are   chronic neuropathic changes seen in the ADM and first dorsal   interosseus muscles without any active/ongoing denervation.    Ulnar neuropathies in the forearm are less common, and so   advanced imaging (MRI or US) to further evaluate the nerve and   possible sites of impingement may be helpful.       2. There is again noted a bilateral sensorimotor median   mononeuropathies across the wrist (I.e. Carpal tunnel syndrome).    There is mild motor axonal loss on the left, none on the right.    There is no active denervation on the right.  This is graded as   moderate on the left and moderate-severe on the right.  When   compared to the 5/2019 study, there is slight improvement in   wrist latencies bilaterally, however there is a decrease in CMAP   amplitude on the right, indicative of possible progression on the   right.     3. The ulnar motor nerve on the left again showed significantly   decreased CMAP amplitudes.  Of note, she has significant atrophy   of both her FDI and ADM clinically.  The superficial ulnar   sensory and dorsal ulnar cutaneous nerves also show no response,   consistent with the known ulnar neuropathy at the elbow.  When   compared to the 5/2019 study, the ulnar motor and sensory   responses are smaller as above.  There is no longer slowing of   the ulnar nerve around the  elbow.  Clinically, she has had   improvement since the ulnar nerve transposition on the left.        ___________________________  Fredrick Joyce D.O.    Comments: I have personnaly reviewed the imaging and I agree with the above radiologist's report.          Assessment:     Encounter Diagnoses   Name Primary?    Right carpal tunnel syndrome Yes    Cubital tunnel syndrome on right           Plan/Discussion:   Sincere was seen today for pain.    Diagnoses and all orders for this visit:    Right carpal tunnel syndrome  -     Vital signs; Standing  -     Cleanse with Chlorhexidine (CHG); Standing  -     Diet NPO; Standing  -     Place FIONA hose; Standing  -     Place sequential compression device; Standing  -     Case Request Operating Room: RELEASE, ULNAR TUNNEL, RELEASE, CARPAL TUNNEL  -     Full code; Standing  -     Place in Outpatient; Standing    Cubital tunnel syndrome on right  -     Vital signs; Standing  -     Cleanse with Chlorhexidine (CHG); Standing  -     Diet NPO; Standing  -     Place FIONA hose; Standing  -     Place sequential compression device; Standing  -     Case Request Operating Room: RELEASE, ULNAR TUNNEL, RELEASE, CARPAL TUNNEL  -     Full code; Standing  -     Place in Outpatient; Standing    Other orders  -     0.9%  NaCl infusion  -     IP VTE LOW RISK PATIENT; Standing  -     ceFAZolin (ANCEF) 2 g in dextrose 5 % 50 mL IVPB       she was found to have significant compression of the ulnar nerve within the right upper extremity between the elbow and the wrist.  This was found to be approximately 8-10 cm distal to the olecranon.  She was also found to have right carpal tunnel syndrome.  I discussed this would require an extended release of the ulnar nerve within the FCU muscle and possibly further into the distal forearm.  Also discussed performing a right carpal tunnel release to  Alleviate her carpal tunnel symptoms.  We discussed performing this on March 3, 2020.  All questions and  concerns were addressed informed consent was obtained and the patient wished proceed

## 2020-02-19 NOTE — PROGRESS NOTES
Chief Complaint: Pain of the Right Wrist      HPI:    Sincere Riggins is a right hand dominant 59 y.o. female presenting today for   Discussion of her recent EMG.  She continues to have symptoms within the right hand mostly with numbness in the small and ring fingers.  She denies any other significant complaints.  Past Medical History:   Diagnosis Date    Carpal tunnel syndrome     Hypercholesteremia        Past Surgical History:   Procedure Laterality Date    CARPAL TUNNEL RELEASE Left 6/7/2019    Procedure: RELEASE, CARPAL TUNNEL;  Surgeon: Vadim Harp Jr., MD;  Location: [facility] OR;  Service: Plastics;  Laterality: Left;    COLONOSCOPY      NERVE TRANSFER Left 6/7/2019    Procedure: TRANSPOSITION, NERVE ulnar;  Surgeon: Vadim Harp Jr., MD;  Location: [facility];  Service: Plastics;  Laterality: Left;    WISDOM TOOTH EXTRACTION          Family History   Problem Relation Age of Onset    Alzheimer's disease Mother     Diabetes Father     No Known Problems Maternal Grandmother     Leukemia Maternal Grandfather     Heart disease Paternal Grandmother     Emphysema Paternal Grandfather        Social History     Socioeconomic History    Marital status:      Spouse name: Not on file    Number of children: Not on file    Years of education: Not on file    Highest education level: Not on file   Occupational History    Not on file   Social Needs    Financial resource strain: Not on file    Food insecurity:     Worry: Not on file     Inability: Not on file    Transportation needs:     Medical: Not on file     Non-medical: Not on file   Tobacco Use    Smoking status: Never Smoker    Smokeless tobacco: Never Used   Substance and Sexual Activity    Alcohol use: Yes     Comment: occasional     Drug use: Not on file    Sexual activity: Not on file   Lifestyle    Physical activity:     Days per week: Not on file     Minutes per session: Not on file    Stress: Not on file   Relationships  "   Social connections:     Talks on phone: Not on file     Gets together: Not on file     Attends Religion service: Not on file     Active member of club or organization: Not on file     Attends meetings of clubs or organizations: Not on file     Relationship status: Not on file   Other Topics Concern    Not on file   Social History Narrative    Not on file       Review of patient's allergies indicates:  No Known Allergies      Current Outpatient Medications:     calcium carbonate (OS-BRITTANY) 500 mg calcium (1,250 mg) tablet, Take 1 tablet by mouth once daily., Disp: , Rfl:     estradiol (VIVELLE-DOT) 0.0375 mg/24 hr, 1 patch every 7 days. , Disp: , Rfl:     fish oil-omega-3 fatty acids 300-1,000 mg capsule, Take by mouth once daily., Disp: , Rfl:     gabapentin (NEURONTIN) 300 MG capsule, 2 daily, Disp: 180 capsule, Rfl: 3    medroxyPROGESTERone (PROVERA) 5 MG tablet, once daily. , Disp: , Rfl:     multivitamin capsule, Take 1 capsule by mouth once daily., Disp: , Rfl:     pravastatin (PRAVACHOL) 10 MG tablet, , Disp: , Rfl:     temazepam (RESTORIL) 15 mg Cap, Take 15 mg by mouth nightly as needed. , Disp: , Rfl: 2    vitamin D 1000 units Tab, Take 1,000 Units by mouth once daily., Disp: , Rfl:         Review of Systems:  Constitutional: no fever or chills  ENT: no nasal congestion or sore throat  Respiratory: no cough or shortness of breath  Cardiovascular: no chest pain or palpitations  Gastrointestinal: no nausea or vomiting, PUD, GERD, NSAID intolerance  Genitourinary: no hematuria or dysuria  Integument/Breast: no rash or pruritis  Hematologic/Lymphatic: no easy bruising or lymphadenopathy  Musculoskeletal: see HPI  Neurological: no seizures or tremors  Behavioral/Psych: no auditory or visual hallucinations      Objective:      PHYSICAL EXAM:  Vitals:    02/19/20 0902   BP: 122/74   Pulse: 65   Weight: 58.1 kg (128 lb)   Height: 5' 4" (1.626 m)   PainSc: 0-No pain     General Appearance: WDWN, " NAD  Neuro/Psych: Mood & affect appropriate  Lungs: Respirations equal and unlabored.   CV: No apparent CV dysfunction, distal pulses intact, RRR  Skin: Intact throughout  Extremities:   Right Hand Exam     Tests   Tinel's sign (median nerve): positive    Other   Erythema: absent  Sensation: decreased (Decreased sensation ulnar distribution)    Comments:   Weak ulnar intrinsics mild hypo thenar atrophy      Right Elbow Exam     Tenderness   The patient is experiencing no tenderness.     Tests   Tinel's sign (cubital tunnel): positive    Other   Erythema: absent    Comments:   Tinel's over the ulnar nerve extending distal to the olecranon              DIAGNOSTICS/IMAGING:    Radiologist's Impression:     NCV & EMG Findings:  ? The right ulnar motor nerve with FDI pickup showed a drop in   amplitude between the below elbow and the wrist stimulations of   about 55%.  Inching was done to try and find location below the   elbow, and there is a drop in amplitude between 4 and 6 cm from   the below elbow stimulations (8cm roughly distal to the   olecranon)  ? The left ulnar motor showed prolonged distal latency and   decrease in CMAP amplitude  ? The bilateral median motor nerves showed prolonged wrist   latencies, the left with mild decrease in CMAP amplitude at the   wrist and elbow  ? The bilateral median sensory nerves showed prolonged latencies,   and the left showed mild decrease in SNAP ampltiude  ? The left ulnar sensory nerve showed no response  ? The right ulnar sensory nerve showed slightly decreased SNAP   amplitude  ? The bilateral LUIS nerves showed no response  ? All remaining nerve conduction studies (as indicated in the   following tables) were within normal limits.  ? Needle evaluation of the right First Dorsal Interosseous and   the right Abductor Digiti Minimi muscles showed increased motor   unit amplitude and diminished recruitment.  ? All remaining muscles (as indicated in the following table)    showed no evidence of electrical instability.    Impression:  1. There is evidence to suggest a right ulnar neuropathy.  In   terms of localization, this is in the forearm, distal to the   branches to the FCU and ulnar FDP, but proximal to the dorsal   ulnar cutaneous branch.  This is affecting the superficial ulnar   branch and the dorsal ulnar cutaneous branch, as well as the   branches to the FDI and ADM. This is new since the May 2019   study.  Although inching technique isn't exact, there does appear   to be a drop in amplitude roughly 8-10 cm distal to the   olecranon, which may be the site of impingement.  There are   chronic neuropathic changes seen in the ADM and first dorsal   interosseus muscles without any active/ongoing denervation.    Ulnar neuropathies in the forearm are less common, and so   advanced imaging (MRI or US) to further evaluate the nerve and   possible sites of impingement may be helpful.       2. There is again noted a bilateral sensorimotor median   mononeuropathies across the wrist (I.e. Carpal tunnel syndrome).    There is mild motor axonal loss on the left, none on the right.    There is no active denervation on the right.  This is graded as   moderate on the left and moderate-severe on the right.  When   compared to the 5/2019 study, there is slight improvement in   wrist latencies bilaterally, however there is a decrease in CMAP   amplitude on the right, indicative of possible progression on the   right.     3. The ulnar motor nerve on the left again showed significantly   decreased CMAP amplitudes.  Of note, she has significant atrophy   of both her FDI and ADM clinically.  The superficial ulnar   sensory and dorsal ulnar cutaneous nerves also show no response,   consistent with the known ulnar neuropathy at the elbow.  When   compared to the 5/2019 study, the ulnar motor and sensory   responses are smaller as above.  There is no longer slowing of   the ulnar nerve around the  elbow.  Clinically, she has had   improvement since the ulnar nerve transposition on the left.        ___________________________  Fredrick Joyce D.O.    Comments: I have personnaly reviewed the imaging and I agree with the above radiologist's report.          Assessment:     Encounter Diagnoses   Name Primary?    Right carpal tunnel syndrome Yes    Cubital tunnel syndrome on right           Plan/Discussion:   Sincere was seen today for pain.    Diagnoses and all orders for this visit:    Right carpal tunnel syndrome  -     Vital signs; Standing  -     Cleanse with Chlorhexidine (CHG); Standing  -     Diet NPO; Standing  -     Place FIONA hose; Standing  -     Place sequential compression device; Standing  -     Case Request Operating Room: RELEASE, ULNAR TUNNEL, RELEASE, CARPAL TUNNEL  -     Full code; Standing  -     Place in Outpatient; Standing    Cubital tunnel syndrome on right  -     Vital signs; Standing  -     Cleanse with Chlorhexidine (CHG); Standing  -     Diet NPO; Standing  -     Place FIONA hose; Standing  -     Place sequential compression device; Standing  -     Case Request Operating Room: RELEASE, ULNAR TUNNEL, RELEASE, CARPAL TUNNEL  -     Full code; Standing  -     Place in Outpatient; Standing    Other orders  -     0.9%  NaCl infusion  -     IP VTE LOW RISK PATIENT; Standing  -     ceFAZolin (ANCEF) 2 g in dextrose 5 % 50 mL IVPB       she was found to have significant compression of the ulnar nerve within the right upper extremity between the elbow and the wrist.  This was found to be approximately 8-10 cm distal to the olecranon.  She was also found to have right carpal tunnel syndrome.  I discussed this would require an extended release of the ulnar nerve within the FCU muscle and possibly further into the distal forearm.  Also discussed performing a right carpal tunnel release to  Alleviate her carpal tunnel symptoms.  We discussed performing this on March 3, 2020.  All questions and  concerns were addressed informed consent was obtained and the patient wished proceed

## 2020-02-27 ENCOUNTER — ANESTHESIA EVENT (OUTPATIENT)
Dept: SURGERY | Facility: OTHER | Age: 60
End: 2020-02-27
Payer: COMMERCIAL

## 2020-02-28 NOTE — PRE ADMISSION SCREENING
Pre admit phone call completed.    Instructions given to patient about NPO status as follows:     The evening before surgery do not eat anything after 9 p.m. ( this includes hard candy, chewing gum and mints).  You may only have GATORADE, POWERADE AND WATER from 9 p.m. until you leave your home. DO NOT  DRINK ANY LIQUIDS ON THE WAY TO THE HOSPITAL.      Patient was also instructed on the below information:    Park in the Parking lot behind the hospital or in the [facility] across the street from the parking lot.  Parking is complimentary.  If you will be discharged the same day as your procedure, please arrange for a responsible adult to drive you home or  to accompany you if traveling by taxi.  YOU WILL NOT BE PERMITTED TO DRIVE OR TO LEAVE THE HOSPITAL ALONE AFTER SURGERY.  It is strongly recommended that you arrange for someone to remain with you for the first 24 hrs following your surgery.    Patient verbalized understanding of above instructions.

## 2020-03-02 ENCOUNTER — TELEPHONE (OUTPATIENT)
Dept: ORTHOPEDICS | Facility: CLINIC | Age: 60
End: 2020-03-02

## 2020-03-02 NOTE — TELEPHONE ENCOUNTER
Call placed to patient to give them 9am arrival time for surgery.  Also reminded patient to not eat or drink after midnight and of 2 week post op appointment with Dr. Harp.  Patient verbalized understanding.    Simin Keyes LPN

## 2020-03-03 ENCOUNTER — HOSPITAL ENCOUNTER (OUTPATIENT)
Facility: OTHER | Age: 60
Discharge: HOME | End: 2020-03-03
Attending: PLASTIC SURGERY | Admitting: PLASTIC SURGERY
Payer: COMMERCIAL

## 2020-03-03 ENCOUNTER — ANESTHESIA (OUTPATIENT)
Dept: SURGERY | Facility: OTHER | Age: 60
End: 2020-03-03
Payer: COMMERCIAL

## 2020-03-03 VITALS
RESPIRATION RATE: 16 BRPM | HEIGHT: 64 IN | OXYGEN SATURATION: 98 % | SYSTOLIC BLOOD PRESSURE: 166 MMHG | DIASTOLIC BLOOD PRESSURE: 70 MMHG | HEART RATE: 67 BPM | BODY MASS INDEX: 21.85 KG/M2 | TEMPERATURE: 98 F | WEIGHT: 128 LBS

## 2020-03-03 DIAGNOSIS — G56.01 RIGHT CARPAL TUNNEL SYNDROME: ICD-10-CM

## 2020-03-03 DIAGNOSIS — G56.21 CUBITAL TUNNEL SYNDROME ON RIGHT: ICD-10-CM

## 2020-03-03 PROCEDURE — 63600175 PHARM REV CODE 636 W HCPCS: Performed by: PLASTIC SURGERY

## 2020-03-03 PROCEDURE — 71000015 HC POSTOP RECOV 1ST HR: Performed by: PLASTIC SURGERY

## 2020-03-03 PROCEDURE — 64718 REVISE ULNAR NERVE AT ELBOW: CPT | Mod: RT,,, | Performed by: PLASTIC SURGERY

## 2020-03-03 PROCEDURE — 63600175 PHARM REV CODE 636 W HCPCS: Performed by: NURSE ANESTHETIST, CERTIFIED REGISTERED

## 2020-03-03 PROCEDURE — 63600175 PHARM REV CODE 636 W HCPCS: Performed by: ANESTHESIOLOGY

## 2020-03-03 PROCEDURE — 37000008 HC ANESTHESIA 1ST 15 MINUTES: Performed by: PLASTIC SURGERY

## 2020-03-03 PROCEDURE — 64718 PR REVISE ULNAR NERVE AT ELBOW: ICD-10-PCS | Mod: RT,,, | Performed by: PLASTIC SURGERY

## 2020-03-03 PROCEDURE — 64721 PR REVISE MEDIAN N/CARPAL TUNNEL SURG: ICD-10-PCS | Mod: 51,RT,, | Performed by: PLASTIC SURGERY

## 2020-03-03 PROCEDURE — 64721 CARPAL TUNNEL SURGERY: CPT | Mod: 51,RT,, | Performed by: PLASTIC SURGERY

## 2020-03-03 PROCEDURE — 37000009 HC ANESTHESIA EA ADD 15 MINS: Performed by: PLASTIC SURGERY

## 2020-03-03 PROCEDURE — 71000016 HC POSTOP RECOV ADDL HR: Performed by: PLASTIC SURGERY

## 2020-03-03 PROCEDURE — 76942 ECHO GUIDE FOR BIOPSY: CPT | Performed by: ANESTHESIOLOGY

## 2020-03-03 PROCEDURE — 36000707: Performed by: PLASTIC SURGERY

## 2020-03-03 PROCEDURE — 36000706: Performed by: PLASTIC SURGERY

## 2020-03-03 RX ORDER — HYDROCODONE BITARTRATE AND ACETAMINOPHEN 5; 325 MG/1; MG/1
1 TABLET ORAL EVERY 4 HOURS PRN
Status: DISCONTINUED | OUTPATIENT
Start: 2020-03-03 | End: 2020-03-03 | Stop reason: HOSPADM

## 2020-03-03 RX ORDER — PROPOFOL 10 MG/ML
VIAL (ML) INTRAVENOUS CONTINUOUS PRN
Status: DISCONTINUED | OUTPATIENT
Start: 2020-03-03 | End: 2020-03-03

## 2020-03-03 RX ORDER — ROPIVACAINE HYDROCHLORIDE 5 MG/ML
INJECTION, SOLUTION EPIDURAL; INFILTRATION; PERINEURAL
Status: COMPLETED | OUTPATIENT
Start: 2020-03-03 | End: 2020-03-03

## 2020-03-03 RX ORDER — PROPOFOL 10 MG/ML
VIAL (ML) INTRAVENOUS
Status: DISCONTINUED | OUTPATIENT
Start: 2020-03-03 | End: 2020-03-03

## 2020-03-03 RX ORDER — ONDANSETRON 2 MG/ML
4 INJECTION INTRAMUSCULAR; INTRAVENOUS DAILY PRN
Status: DISCONTINUED | OUTPATIENT
Start: 2020-03-03 | End: 2020-03-03 | Stop reason: HOSPADM

## 2020-03-03 RX ORDER — LIDOCAINE HYDROCHLORIDE 10 MG/ML
0.5 INJECTION, SOLUTION EPIDURAL; INFILTRATION; INTRACAUDAL; PERINEURAL ONCE
Status: DISCONTINUED | OUTPATIENT
Start: 2020-03-03 | End: 2020-03-03 | Stop reason: HOSPADM

## 2020-03-03 RX ORDER — SODIUM CHLORIDE 0.9 % (FLUSH) 0.9 %
10 SYRINGE (ML) INJECTION
Status: DISCONTINUED | OUTPATIENT
Start: 2020-03-03 | End: 2020-03-03 | Stop reason: HOSPADM

## 2020-03-03 RX ORDER — LIDOCAINE HYDROCHLORIDE 20 MG/ML
INJECTION INTRAVENOUS
Status: DISCONTINUED | OUTPATIENT
Start: 2020-03-03 | End: 2020-03-03

## 2020-03-03 RX ORDER — OXYCODONE HYDROCHLORIDE 5 MG/1
5 TABLET ORAL
Status: DISCONTINUED | OUTPATIENT
Start: 2020-03-03 | End: 2020-03-03 | Stop reason: HOSPADM

## 2020-03-03 RX ORDER — PHENYLEPHRINE HYDROCHLORIDE 10 MG/ML
INJECTION INTRAVENOUS
Status: DISCONTINUED | OUTPATIENT
Start: 2020-03-03 | End: 2020-03-03

## 2020-03-03 RX ORDER — HYDROMORPHONE HYDROCHLORIDE 2 MG/ML
0.4 INJECTION, SOLUTION INTRAMUSCULAR; INTRAVENOUS; SUBCUTANEOUS EVERY 5 MIN PRN
Status: DISCONTINUED | OUTPATIENT
Start: 2020-03-03 | End: 2020-03-03 | Stop reason: HOSPADM

## 2020-03-03 RX ORDER — CEFAZOLIN SODIUM 1 G/3ML
2 INJECTION, POWDER, FOR SOLUTION INTRAMUSCULAR; INTRAVENOUS
Status: COMPLETED | OUTPATIENT
Start: 2020-03-03 | End: 2020-03-03

## 2020-03-03 RX ORDER — FENTANYL CITRATE 50 UG/ML
100 INJECTION, SOLUTION INTRAMUSCULAR; INTRAVENOUS EVERY 5 MIN PRN
Status: COMPLETED | OUTPATIENT
Start: 2020-03-03 | End: 2020-03-03

## 2020-03-03 RX ORDER — MIDAZOLAM HYDROCHLORIDE 1 MG/ML
5 INJECTION INTRAMUSCULAR; INTRAVENOUS ONCE
Status: COMPLETED | OUTPATIENT
Start: 2020-03-03 | End: 2020-03-03

## 2020-03-03 RX ORDER — MEPERIDINE HYDROCHLORIDE 25 MG/ML
12.5 INJECTION INTRAMUSCULAR; INTRAVENOUS; SUBCUTANEOUS ONCE AS NEEDED
Status: DISCONTINUED | OUTPATIENT
Start: 2020-03-03 | End: 2020-03-03 | Stop reason: HOSPADM

## 2020-03-03 RX ORDER — SODIUM CHLORIDE 0.9 % (FLUSH) 0.9 %
3 SYRINGE (ML) INJECTION
Status: DISCONTINUED | OUTPATIENT
Start: 2020-03-03 | End: 2020-03-03 | Stop reason: HOSPADM

## 2020-03-03 RX ORDER — SODIUM CHLORIDE 9 MG/ML
INJECTION, SOLUTION INTRAVENOUS CONTINUOUS
Status: DISCONTINUED | OUTPATIENT
Start: 2020-03-03 | End: 2020-03-03 | Stop reason: HOSPADM

## 2020-03-03 RX ORDER — HYDROCODONE BITARTRATE AND ACETAMINOPHEN 5; 325 MG/1; MG/1
1 TABLET ORAL EVERY 6 HOURS PRN
Qty: 20 TABLET | Refills: 0 | Status: SHIPPED | OUTPATIENT
Start: 2020-03-03

## 2020-03-03 RX ORDER — SODIUM CHLORIDE, SODIUM LACTATE, POTASSIUM CHLORIDE, CALCIUM CHLORIDE 600; 310; 30; 20 MG/100ML; MG/100ML; MG/100ML; MG/100ML
INJECTION, SOLUTION INTRAVENOUS CONTINUOUS
Status: DISCONTINUED | OUTPATIENT
Start: 2020-03-03 | End: 2020-03-03 | Stop reason: HOSPADM

## 2020-03-03 RX ADMIN — PROPOFOL 60 MG: 10 INJECTION, EMULSION INTRAVENOUS at 10:03

## 2020-03-03 RX ADMIN — CEFAZOLIN 2 G: 330 INJECTION, POWDER, FOR SOLUTION INTRAMUSCULAR; INTRAVENOUS at 10:03

## 2020-03-03 RX ADMIN — MIDAZOLAM HYDROCHLORIDE 2 MG: 1 INJECTION, SOLUTION INTRAMUSCULAR; INTRAVENOUS at 09:03

## 2020-03-03 RX ADMIN — FENTANYL CITRATE 100 MCG: 50 INJECTION, SOLUTION INTRAMUSCULAR; INTRAVENOUS at 09:03

## 2020-03-03 RX ADMIN — ROPIVACAINE HYDROCHLORIDE 30 ML: 5 INJECTION, SOLUTION EPIDURAL; INFILTRATION; PERINEURAL at 09:03

## 2020-03-03 RX ADMIN — PHENYLEPHRINE HYDROCHLORIDE 100 MCG: 10 INJECTION INTRAVENOUS at 10:03

## 2020-03-03 RX ADMIN — PROPOFOL 75 MCG/KG/MIN: 10 INJECTION, EMULSION INTRAVENOUS at 10:03

## 2020-03-03 RX ADMIN — SODIUM CHLORIDE, SODIUM LACTATE, POTASSIUM CHLORIDE, AND CALCIUM CHLORIDE: 600; 310; 30; 20 INJECTION, SOLUTION INTRAVENOUS at 09:03

## 2020-03-03 RX ADMIN — LIDOCAINE HYDROCHLORIDE 25 MG: 20 INJECTION, SOLUTION INTRAVENOUS at 10:03

## 2020-03-03 NOTE — DISCHARGE INSTRUCTIONS
Anesthesia: Monitored Anesthesia Care (MAC)  Anesthesia safety  Tips for anesthesia safety include the following:   · Follow all instructions you are given for how long not to eat or drink before your procedure.  · Be sure your healthcare provider knows what medicines you take, especially any anti-inflammatory medicine or blood thinners. This includes aspirin and any other over-the-counter medicines, herbs, and supplements.  · Have an adult family member or friend drive you home after the procedure.  · For the first 24 hours after your surgery:  ¨ Do not drive or use heavy equipment.  ¨ Do not make important decisions or sign documents.  ¨ Avoid alcohol.  ¨ Have someone stay with you, if possible. They can watch for problems and help keep you safe.  Date Last Reviewed: 12/1/2016 © 2000-2017 [employer]. [address] [place] [address], [place] [address]. All rights reserved. This information is not intended as a substitute for professional medical care. Always follow your healthcare professional's instructions.

## 2020-03-03 NOTE — PLAN OF CARE
Sincere Riggins has met all discharge criteria from Phase II. Vital Signs are stable, ambulating  without difficulty. Discharge instructions given, patient verbalized understanding.

## 2020-03-03 NOTE — ANESTHESIA PREPROCEDURE EVALUATION
03/03/2020  Sincere Riggins is a 59 y.o., female.    Anesthesia Evaluation    I have reviewed the Patient Summary Reports.    I have reviewed the Nursing Notes.   I have reviewed the Medications.     Review of Systems  Anesthesia Hx:  No problems with previous Anesthesia  Denies Family Hx of Anesthesia complications.   Denies Personal Hx of Anesthesia complications.   Social:  Non-Smoker    Hematology/Oncology:  Hematology Normal   Oncology Normal     EENT/Dental:EENT/Dental Normal   Cardiovascular:  Cardiovascular Normal Exercise tolerance: good     Pulmonary:  Pulmonary Normal    Renal/:  Renal/ Normal     Musculoskeletal:  Musculoskeletal Normal    Neurological:   Peripheral Neuropathy    Endocrine:  Endocrine Normal    Dermatological:  Skin Normal    Psych:  Psychiatric Normal           Physical Exam  General:  Well nourished    Airway/Jaw/Neck:  Airway Findings: Mouth Opening: Normal Tongue: Normal  General Airway Assessment: Adult  Mallampati: I  TM Distance: Normal, at least 6 cm  Jaw/Neck Findings:  Neck ROM: Normal ROM      Dental:  Dental Findings: In tact             Anesthesia Plan  Type of Anesthesia, risks & benefits discussed:  Anesthesia Type:  regional  Patient's Preference:   Intra-op Monitoring Plan:   Intra-op Monitoring Plan Comments:   Post Op Pain Control Plan: peripheral nerve block  Post Op Pain Control Plan Comments:   Induction:    Beta Blocker:         Informed Consent: Patient understands risks and agrees with Anesthesia plan.  Questions answered. Anesthesia consent signed with patient.  ASA Score: 1     Day of Surgery Review of History & Physical:    H&P update referred to the surgeon.         Ready For Surgery From Anesthesia Perspective.

## 2020-03-03 NOTE — OP NOTE
[facility]  Plastic Surgery  Operative Note    SUMMARY     Date of Procedure: 3/3/2020     Procedure: Procedure(s) (LRB):  RELEASE, ULNAR TUNNEL (Right) with subcutaneous transposition  Distal decompression of the right ulnar nerve within the right forearm  RELEASE, CARPAL TUNNEL (Right)     Surgeon(s) and Role:     * Vadim Harp Jr., MD - Primary    Assisting Surgeon: None    Pre-Operative Diagnosis: Right carpal tunnel syndrome [G56.01]  Cubital tunnel syndrome on right [G56.21]    Post-Operative Diagnosis:  Same    Anesthesia: Regional    Technical Procedures Used:  Release of right ulnar nerve at the right elbow with anterior subcutaneous transposition and extended decompression of the ulnar nerve through the FCU muscle extending distally into the forearm due to findings of compression on EMG, right open carpal tunnel release    Description of the Findings of the Procedure:  There was dilatation of the nerve just proximal to the cubital tunnel at the right elbow with mild subluxation over the medial epicondyle after release.  Additionally there was no masses or fascial attachments causing compression distally within the FCU muscle.  Right carpal tunnel syndrome    Significant Surgical Tasks Conducted by the Assistant(s), if Applicable:  None    Complications: No    Estimated Blood Loss (EBL): * No values recorded between 3/3/2020 10:27 AM and 3/3/2020 11:38 AM *           Implants: * No implants in log *    Specimens:   Specimen (12h ago, onward)    None                  Condition: Good    Disposition: PACU - hemodynamically stable.    Attestation: I was present and scrubbed for the entire procedure.     Indications:    Sincere Riggins is a 59-year-old right-hand-dominant female who previously underwent a left cubital tunnel release with anterior transposition and carpal tunnel release due to severe cubital tunnel of the left upper extremity.  The patient subsequently developed acute  numbness and tingling within the small finger and ring finger of the right hand.  She underwent a repeat EMG which demonstrated bilateral carpal tunnel syndrome as well as compression or slowing of the ulnar nerve distal to the elbow.  Because of these findings was suggest that she undergo a right carpal tunnel release and release of the ulnar nerve with distal extension and compression of the ulnar nerve into the FCU muscle based on the recent EMG.  I discussed the risks benefits alternatives in detail including the possibility of failure to improve due to underlying idiopathic neuropathy, injury to the nerve, incomplete release, and the need for additional surgeries or procedures. The patient understood this and all questions and concerns were addressed the risks benefits alternatives were discussed in detail and informed consent was obtained. She wished to proceed.    Procedure in detail  After proper identification of Sincere Riggins in the preoperative area where she received an upper extremity block the patient was wheeled to operative room on a hospital stretcher.  Pressure points were padded and checked this time. A time-out was called with surgeons nurses and anesthesia agreed upon the patient and procedure. She was then induced under MAC sedation.  Once the patient was well sedated she was given Ancef prophylactic antibiotics.  Her right upper extremity was then prepped and draped in usual sterile fashion. A sterile padded 18 in tourniquet was then placed to the right upper arm and next the arm was exsanguinated using Esmarch tourniquet and tourniquet was inflated to 250 mm of mercury.  An extended incision was planned between the medial epicondyle and the olecranon within the medial elbow extending approximately 8 cm distal to the olecranon.  An incision was made through skin and dermis using a 15 blade scalpel.  The subcutaneous tissue was then carefully dissected using a 15 blade scalpel.  The fascia  overlying the triceps and the FCU muscle were identified at this time. The nerve was then identified proximally within the medial aspect of the triceps.  It was then carefully dissected proximally to release any fascia or potential compression sites within the arm.  I then continued my dissection over the nerve through the tissue overlying it through the cubital tunnel.  It was noted that the nerve was dilated proximal to the cubital tunnel.  I dissection was continued through the cubital tunnel and into the FCU muscle.  The fascia over the muscle was then released distally using tenotomy scissors.  I then carefully split the muscle fibers over the nerve into the forearm.  Using retraction I was able to visualize distally to approximately 15 cm distal to the olecranon.  All fascia overlying the nerve was carefully dissected using Littler scissors under direct visualization.  There were no vascular leash is or fascial bands overlying the nerve.  There were no signs of constriction or masses within the nerve distal to the forearm.  I was satisfied with the release of the nerve into the distal forearm and turned my attention back to the medial epicondyle.  The arm was then placed in full flexion extension was noted that there was subluxation of the nerve over the medial epicondyle.  It was then decided that the subcutaneous anterior transposition would be required and beneficial for the patient. Next subcutaneous tissue over the medial aspect of the forearm was then raised and anterior direction to expose the flexor pronator group origin off of the medial epicondyle.  The nerve was then handled using vessel loops and freed from the surrounding tissue proximal and distal to the elbow to allow for easy transposition of the nerve over and anterior to the medial epicondyle.  The 1st branch to the FCU muscle was sacrificed due the fact this was tethering the nerve from a gentle track over the medial epicondyle.  Additional  branches to the FCU muscle were preserved.  All vascular trigger running with the nerve was also preserved.  The intermuscular septum was then taken down just proximal to the medial epicondyle to all avoid compression of the nerve as the nerve ran anterior to the medial epicondyle.  Next a strip of fascia was then raised from the flexor pronator group based off of the medial epicondyle.  The nerve was then gently transposed anterior to the medial epicondyle and the fascial slip was then sutured to the subcutaneous tissue using 3 0 Vicryl sutures in interrupted fashion. The elbow was then placed into full flexion extension nerve appeared to have a nice transition and smooth excursion.  I was then satisfied with the anterior transposition of the nerve. I then turned my attention to the right carpal tunnel release.  Longitudinal incision was made over the carpal tunnel using a 15 blade scalpel carried down through subcutaneous tissue to expose the palmaris fascia.  The palmaris fascia was then split using a 15 blade scalpel longitudinally exposing the underlying flexor retinacular ligament.  Under direct visualization of flexor retinacular ligament was then divided using a 15 blade scalpel with care to not injure the underlying structures.  An antebrachial fasciotomy was performed under direct visualization in a distal proximal direction.  I then digitally palpated through the carpal tunnel and was satisfied with the release.  The median nerve was visualized and noted to be in continuity and uninjured  I was then satisfied with the release of the carpal tunnel.  The tourniquet was then released at this time and hemostasis was achieved using bipolar cautery.  Each wound was irrigated copious amounts normal saline.  The palmar incision was closed using 4 0 nylon sutures in a horizontal interrupted fashion. The medial elbow incision was then closed in multiple layers 1st closing the deep fascial layer using 3 0 Vicryl  sutures in interrupted fashion followed by closure of the dermis using 3 0 Vicryl sutures in a deep dermal interrupted fashion.  And finally closing the skin using a 4 0 Monocryl in a subcuticular fashion. The wounds were then cleaned and dried Xeroform was applied to the palmar incision and Steri-Strips to the medial elbow incision. Dry sterile dressings were then applied followed by a long arm splint with the elbow at 45° of flexion at the wrist at neutral in a fingers free .  This concluded the procedure the patient tolerated procedure well without any complications at the end of the case needle and sponge counts were correct x2 and I was present scrubbed during all aspects of the procedure. The patient was then awoke from MAC anesthesia and taken to PACU for further recovery.

## 2020-03-03 NOTE — ANESTHESIA POSTPROCEDURE EVALUATION
Anesthesia Post Evaluation    Patient: Sincere Riggins    Procedure(s) Performed: Procedure(s) (LRB):  RELEASE, ULNAR TUNNEL (Right)  RELEASE, CARPAL TUNNEL (Right)    Final Anesthesia Type: general    Patient location during evaluation: OPS  Patient participation: Yes- Able to Participate  Level of consciousness: awake and alert  Post-procedure vital signs: reviewed and stable  Pain management: adequate  Airway patency: patent    PONV status at discharge: No PONV  Anesthetic complications: no      Cardiovascular status: blood pressure returned to baseline and hemodynamically stable  Respiratory status: unassisted, spontaneous ventilation and room air  Hydration status: euvolemic  Follow-up not needed.          Vitals Value Taken Time   /63 3/3/2020  9:26 AM   Temp 36.8 °C (98.2 °F) 3/3/2020  9:26 AM   Pulse 66 3/3/2020  9:26 AM   Resp 16 3/3/2020  9:26 AM   SpO2 97 % 3/3/2020  9:26 AM         No case tracking events are documented in the log.      Pain/Le Score: No data recorded

## 2020-03-03 NOTE — BRIEF OP NOTE
[facility]  Brief Operative Note    Surgery Date: 3/3/2020     Surgeon(s) and Role:     * Vadim Harp Jr., MD - Primary    Assisting Surgeon: None    Pre-op Diagnosis:  Right carpal tunnel syndrome [G56.01]  Cubital tunnel syndrome on right [G56.21]    Post-op Diagnosis:  Post-Op Diagnosis Codes:     * Right carpal tunnel syndrome [G56.01]     * Cubital tunnel syndrome on right [G56.21]    Procedure(s) (LRB):  RELEASE, ULNAR TUNNEL (Right) with subcutaneous transposition  RELEASE, CARPAL TUNNEL (Right)    Anesthesia: Regional    Description of the findings of the procedure(s):  Compression subluxation of the right ulnar nerve at the cubital tunnel and right carpal tunnel syndrome    Estimated Blood Loss: * No values recorded between 3/3/2020 10:27 AM and 3/3/2020 11:38 AM *         Specimens:   Specimen (12h ago, onward)    None            Discharge Note    OUTCOME: Patient tolerated treatment/procedure well without complication and is now ready for discharge.    DISPOSITION: Home or Self Care    FINAL DIAGNOSIS:  Cubital tunnel syndrome on right    FOLLOWUP: In clinic    DISCHARGE INSTRUCTIONS:    Discharge Procedure Orders   Diet general     Call MD for:  temperature >100.4     Call MD for:  persistent nausea and vomiting     Call MD for:  severe uncontrolled pain     Call MD for:  difficulty breathing, headache or visual disturbances     Call MD for:  redness, tenderness, or signs of infection (pain, swelling, redness, odor or green/yellow discharge around incision site)     Call MD for:  hives     Call MD for:  persistent dizziness or light-headedness     Call MD for:  extreme fatigue     Keep surgical extremity elevated     Ice to affected area     Lifting restrictions     No driving, operating heavy equipment or signing legal documents while taking pain medication.     Leave dressing on - Keep it clean, dry, and intact until clinic visit        Clinical Reference Documents Added to  Patient Instructions       Document    MONITORED ANESTHESIA CARE (MAC): ANESTHESIA (ENGLISH)

## 2020-03-03 NOTE — ANESTHESIA PROCEDURE NOTES
Peripheral Block    Patient location during procedure: holding area    Reason for block: primary anesthetic   Diagnosis: elbow surgery   Timeout: 3/3/2020 9:50 AM     Staffing  Authorizing Provider: Salvador Landeros MD  Performing Provider: Salvador Landeros MD    Preanesthetic Checklist  Completed: patient identified, site marked, surgical consent, pre-op evaluation, timeout performed, IV checked, risks and benefits discussed and monitors and equipment checked  Peripheral Block  Patient position: supine  Prep: ChloraPrep  Patient monitoring: heart rate, cardiac monitor, continuous pulse ox and frequent blood pressure checks  Block type: supraclavicular  Laterality: right  Injection technique: single shot  Needle  Needle type: Stimuplex   Needle gauge: 21 G  Needle length: 3.5 in  Needle localization: ultrasound guidance and nerve stimulator   -ultrasound image captured on disc.  Assessment  Injection assessment: local visualized surrounding nerve, negative parasthesia and negative aspiration  Paresthesia pain: none  Heart rate change: no  Slow fractionated injection: yes

## 2020-03-18 ENCOUNTER — OFFICE VISIT (OUTPATIENT)
Dept: ORTHOPEDICS | Facility: CLINIC | Age: 60
End: 2020-03-18
Payer: COMMERCIAL

## 2020-03-18 VITALS
WEIGHT: 128 LBS | DIASTOLIC BLOOD PRESSURE: 80 MMHG | BODY MASS INDEX: 21.85 KG/M2 | SYSTOLIC BLOOD PRESSURE: 128 MMHG | HEART RATE: 66 BPM | HEIGHT: 64 IN

## 2020-03-18 DIAGNOSIS — Z98.890 STATUS POST SURGERY: Primary | ICD-10-CM

## 2020-03-18 PROCEDURE — 99024 PR POST-OP FOLLOW-UP VISIT: ICD-10-PCS | Mod: S$GLB,,, | Performed by: PLASTIC SURGERY

## 2020-03-18 PROCEDURE — 99999 PR PBB SHADOW E&M-EST. PATIENT-LVL III: ICD-10-PCS | Mod: PBBFAC,,, | Performed by: PLASTIC SURGERY

## 2020-03-18 PROCEDURE — 99999 PR PBB SHADOW E&M-EST. PATIENT-LVL III: CPT | Mod: PBBFAC,,, | Performed by: PLASTIC SURGERY

## 2020-03-18 PROCEDURE — 99024 POSTOP FOLLOW-UP VISIT: CPT | Mod: S$GLB,,, | Performed by: PLASTIC SURGERY

## 2020-03-18 NOTE — PROGRESS NOTES
"Ms. Riggins is here today for a post-operative visit.she is 15 days status post right ulnar nerve release and anterior transposition and right carpal tunnel release. she reports that she is doing well.  Pain is minimal.  she is not taking pain she denies fever, chills, and sweats since the time of the surgery.     Physical exam:    Vitals:    03/18/20 1127   BP: 128/80   Pulse: 66   Weight: 58.1 kg (128 lb)   Height: 5' 4" (1.626 m)   PainSc:   3     Post op dressing taken down.  Incisions are clean, dry and intact.  There is no erythema or exudate.  There is no sign of any infection. she is NVI.  She has good opposition intrinsics are strong and functioning decreased sensation ulnar distribution compared to the median distribution    Assessment:  Status post surgery  Plan:  Sincere was seen today for post-op evaluation and post-op evaluation.    Diagnoses and all orders for this visit:    Status post surgery        - sutures removed today in clinic, may get it wet in the shower and use regular soap  Discussed that it will take time for her numbness to improve within the small and ring fingers will continue to monitor progress  - Continue Range of motion exercises   - Tylenol 500 mg and/or Ibuprofen 400mg every 4-6 hours with food for pain as tolerated  - Therapy recommended:  She is currently involved with therapy for the left upper extremity I discussed that she may begin therapy of the right upper extremity as tolerated.  - Follow up:  8-12 weeks  "

## 2020-04-20 ENCOUNTER — PATIENT MESSAGE (OUTPATIENT)
Dept: REHABILITATION | Facility: HOSPITAL | Age: 60
End: 2020-04-20

## 2020-04-29 ENCOUNTER — CLINICAL SUPPORT (OUTPATIENT)
Dept: REHABILITATION | Facility: HOSPITAL | Age: 60
End: 2020-04-29
Payer: COMMERCIAL

## 2020-04-29 DIAGNOSIS — G60.9 CHRONIC IDIOPATHIC AXONAL POLYNEUROPATHY: ICD-10-CM

## 2020-04-29 DIAGNOSIS — M25.632 STIFFNESS OF LEFT WRIST JOINT: ICD-10-CM

## 2020-04-29 DIAGNOSIS — M25.531 PAIN IN RIGHT WRIST: ICD-10-CM

## 2020-04-29 DIAGNOSIS — M79.602 LEFT ARM PAIN: ICD-10-CM

## 2020-04-29 DIAGNOSIS — M62.81 MUSCLE WEAKNESS: ICD-10-CM

## 2020-04-29 DIAGNOSIS — M25.521 PAIN IN RIGHT ELBOW: ICD-10-CM

## 2020-04-29 PROCEDURE — 97110 THERAPEUTIC EXERCISES: CPT | Mod: PO

## 2020-04-29 PROCEDURE — 97168 OT RE-EVAL EST PLAN CARE: CPT | Mod: PO,59

## 2020-04-29 PROCEDURE — 97018 PARAFFIN BATH THERAPY: CPT | Mod: PO

## 2020-04-29 PROCEDURE — 97140 MANUAL THERAPY 1/> REGIONS: CPT | Mod: PO

## 2020-04-29 NOTE — PATIENT INSTRUCTIONS
[facility]  OCCUPATIONAL THERAPY  HOME EXERCISE PROGRAM     Complete the following strengthening program 1x/day.                       _                      Joseph Carlin OTL  Occupational Therapist

## 2020-04-29 NOTE — PLAN OF CARE
[facility] Occupational Therapy  Re-Evaluation     Date: 4/29/2020  Name: Sincere Riggins  Clinic Number: [id number]    Therapy Diagnosis:   Encounter Diagnoses   Name Primary?    Left arm pain     Stiffness of left wrist joint     Muscle weakness     Chronic idiopathic axonal polyneuropathy     Pain in right elbow     Pain in right wrist      Physician: Vadim Harp Jr*    Physician Orders: Evaluate & treat  Medical Diagnosis: L CTR & CuTR and R CTR & CuTR  Surgical Procedure and Date: L CTR & CuTR, 06/07/2019; R CTR & CuTR, 03/02/20  Evaluation Date: 04/28/20  Insurance Authorization Period Expiration: 12/31/2020  Plan of Care Certification Period: 04/28/20 to 06/09/20  Date of Return to MD: TBD    Visit # / Visits authorized: 6 / 20  Time In:1:00 pm  Time Out: 2:15 pm  Total Billable Time: 75 minutes (Re-sandoval, P, 1MT, 1TE)    Precautions:  Standard    Subjective     Involved Side: R elbow/forearm/wrist/hand  Dominant Side: Right  Date of Onset: Jan 2020  Mechanism of Injury: gradual onset of symptoms that progressively worsened  History of Current Condition: patient saw Dr. Harp regarding new onset of symptoms in her RUE. A NCV/EMG was completed and surgical intervention was required to improve condition.  Surgical Procedure: L CTR & CuTR; R CTR & CuTR  Imaging: X-rays,  NCV/EMG  Previous Therapy: patient received OT for post op L CTR & CuTR from 06/27/2019 to 02/06/2020    Past Medical History/Physical Systems Review:   Sincere Riggins  has a past medical history of Carpal tunnel syndrome, Hypercholesteremia, and Neuropathy.    Sincere Riggins  has a past surgical history that includes [place] tooth extraction; Colonoscopy; Carpal tunnel release (Left, 6/7/2019); Nerve transfer (Left, 6/7/2019); Release of ulnar nerve at cubital tunnel (Right, 3/3/2020); and Carpal tunnel release (Right, 3/3/2020).    Sincere has a current medication list which includes the following  prescription(s): calcium carbonate, estradiol, fish oil-omega-3 fatty acids, gabapentin, hydrocodone-acetaminophen, medroxyprogesterone, multivitamin, pravastatin, temazepam, and vitamin d.    Review of patient's allergies indicates:  No Known Allergies     Patient's Goals for Therapy: Regain strength and function in my hands    Pain:  Functional Pain Scale Rating 0-10:   4/10 on average  2/10 at best  7/10 at worst  Location: R elbow surgical site & R wrist/hand  Description: Aching, Burning, Tingling, Numb, Sharp, Shooting and Hot (in ulnar aspect of R hand)  Aggravating Factors: Night Time and with any use of R hand, typing or writing  Easing Factors: pain medication, ice, rest and elevation    Occupation:  Principle of [employer]  Working presently: employed  Duties: administrative    Functional Limitations/Social History:    Previous functional status includes: Independent with all ADLs.     Current FunctionalStatus   Home/Living environment : lives with their family      Limitation of Functional Status as follows:   ADLs/IADLs:     - Feeding: difficulty cutting food/meat and opening containers    - Bathing: no difficulty    - Dressing/Grooming: difficulty with fasteners    - Driving: difficulty with long distance driving      Objective     Observation/Appearance:  R elbow & wrist/hand surgical incisions are closed and well healed.  Hypertrophic scarring is noted primarily at R wrist/hand surgical site and is currently moderately dense with minimal adhesions to underlying soft tissue.  Scar tissue at R elbow is minimally dense with minimal adhesions.  Edema is present at both R elbow & R wrist/hand, but is minimal.      Edema. Measured in centimeters.   4/29/2020 4/29/2020    Left Right   Elbow crease 23.0 24.0   2in. Below elbow 22.4 23.5   Wrist Crease 14.5 14.5   Mid palm 17.0 18.5   MCPs 18.2 18.5      04/29/20 04/29/20    Left Right   Elbow Ext/Flex 0/150 0/145   Forearm Sup/pron WNL WNL   Wrist E/F  65/70 60/70   Wrist RD/UD 24/35 20/35   Thumb R/P Abd 45/50 45/47   Thumb MP flex 61 64   Thumb IP flex 80 65   Thumb Hank[place] To SF DPC To SF DPC       Hand ROM:  Full composite fist WNL, Price    Sensation: Patient reports having some residual numbness and tingling, however, patient reports this is improving since the surgery.  Light touch and temp are intact in BUE's     Strength (Dyanmometer) and Pinch Strength (Pinch Gauge)  Measured in pounds and psi.    4/29/2020 4/29/2020    Left Right   Rung II 31.5 40   Key Pinch 4 6   3pt Pinch 2.5 4.5   2pt Pinch 3 3       Treatment     Treatment Time In: 1:35 pm  Treatment Time Out: 2:15 pm  Total Treatment time separate from Evaluation time: 40 minutes    Sincere received the following supervised modalities after being cleared for contradictions for 10 minutes:   -Paraffin to R elbow/wrist/hand and L wrist/hand, pre-tx to decrease pain & increase tissue extensibility    Sincere received the following manual therapy techniques for 15 minutes:   -Edema mgmt w/ lymphatic drainage technique;  Deep STM, including MFR, CFM & scar mgmt to R elbow/wrist/hand & L wrist/hand, using hand techniques and IASTM tools to increase blood flow/circulation, improve soft tissue pliability and decrease pain.    Sincere received therapeutic exercises for 15 minutes including:  -performed initial HEP, see attached in Patient Instructions for details    Home Exercise Program/Education:  Issued HEP (see patient instructions in EMR) and educated on modality use for pain management . Exercises were reviewed and Sincere was able to demonstrate them prior to the end of the session.   Pt received a written copy of exercises to perform at home. Sincere demonstrated good  understanding of the education provided.  Pt was advised to perform these exercises free of pain, and to stop performing them if pain occurs.    Patient/Family Education: role of OT, goals for OT, scheduling/cancellations - pt verbalized  understanding. Discussed insurance limitations with patient.    Additional Education provided: scar tissue and pain management    Assessment     Sincere Riggins is a 59 y.o. female referred to outpatient occupational therapy and presents with a medical diagnosis of R CTR & CuTR and L CTR & CuTR, resulting in Decreased  strength, Decreased pinch strength, Decreased functional hand use, Increased pain, Edema, Joint Stiffness, Scar Adhesions, Diminished/Impaired Sensation and Diminished/Impaired Coordination and demonstrates limitations as described in the chart below. Following medical record review it is determined that pt will benefit from occupational therapy services in order to maximize pain free and/or functional use of bilateral elbow/wrist/hand. The following goals were discussed with the patient and patient is in agreement with them as to be addressed in the treatment plan. The patient's rehab potential is Good.     Anticipated barriers to occupational therapy: comorbid condition of neuropathy  Pt has no cultural, educational or language barriers to learning provided.    Profile and History Assessment of Occupational Performance Level of Clinical Decision Making Complexity Score   Occupational Profile:   Sincere Riggins is a 59 y.o. 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 Sincere Riggins has difficulty with  feeding, grooming and dressing  driving/transportation management, shopping, phone/computer use and housework/household chores and work duties (typing, writing)  affecting his/her daily functional abilities. His/her main goal for therapy is regain function and decrease pain.     Comorbidities:   neuropathy    Medical and Therapy History Review:   Brief     Performance Deficits    Physical:  Muscle Power/Strength  Skin Integrity/Scar Formation  Edema  Control of Voluntary Movement   Strength  Pinch Strength  Fine Motor  Coordination  Proprioception Functions  Pain    Cognitive:  No Deficits    Psychosocial:    No Deficits     Clinical Decision Making:  low    Assessment Process:  Problem-Focused Assessments    Modification/Need for Assistance:  Not Necessary    Intervention Selection:  Several Treatment Options       low  Based on PMHX, co morbidities , data from assessments and functional level of assistance required with task and clinical presentation directly impacting function.       The following goals were discussed with the patient and patient is in agreement with them as to be addressed in the treatment plan.     Goals:   Short Term Goals: (in 2 weeks)  1) Patient will be independent in HEP  2) Decrease pain in R elbow/wrist/hand to no more than 3-4/10 worst in ADL/IADL's  3) Increase AROM in R wrist ext by 3-5 degrees for improved functioning in ADL/IADL's  4) Increase B  strength by 3-5 psi for increased functional use  5) Decrease edema in R elbow/wrist by .2 cm     Long Term Goals: (in 6 weeks)  1) Decrease pain in RUE to no more than 1-2/10 worst in ADL/IADL's  2) Increase AROM of B elbow/wrist/hand to WFL for increased functioning in ADL/IADL's  3) Increase strength in B  & pinch by 25% of initial measures for improved functioning in ADL/IADL's  4) Increased functioning in ADL/IADL's, as evidenced by a FOTO impairment rating of no more than 25%  5) Decrease edema in R elbow/wrist to trace or none      Plan     Certification Period/Plan of care expiration: 4/29/2020 to 06/09/20.    Outpatient Occupational Therapy 2 times weekly for 6 weeks to include the following interventions: Paraffin, Manual therapy/joint mobilizations, Modalities for pain management, US 3 mhz, Therapeutic exercises/activities., Strengthening, Orthotic Fabrication/Fit/Training, Edema Control, Scar Management, Electrical Modalities and Joint Protection. Recommend to resume OT to LUE strengthening program while patient is receiving post op  aftercare for her RUE.        Joseph Carlin, OT

## 2020-05-01 DIAGNOSIS — M25.642 DECREASED RANGE OF MOTION OF FINGER OF LEFT HAND: ICD-10-CM

## 2020-05-01 DIAGNOSIS — Z98.890 STATUS POST SURGERY: Primary | ICD-10-CM

## 2020-05-01 DIAGNOSIS — R29.898 LEFT HAND WEAKNESS: ICD-10-CM

## 2020-05-01 DIAGNOSIS — M25.641 DECREASED RANGE OF MOTION OF FINGER OF RIGHT HAND: ICD-10-CM

## 2020-05-01 DIAGNOSIS — R29.898 WEAKNESS OF RIGHT HAND: ICD-10-CM

## 2020-05-05 ENCOUNTER — CLINICAL SUPPORT (OUTPATIENT)
Dept: REHABILITATION | Facility: HOSPITAL | Age: 60
End: 2020-05-05
Payer: COMMERCIAL

## 2020-05-05 DIAGNOSIS — M25.531 PAIN IN RIGHT WRIST: ICD-10-CM

## 2020-05-05 DIAGNOSIS — M79.602 LEFT ARM PAIN: ICD-10-CM

## 2020-05-05 DIAGNOSIS — R29.898 WEAKNESS OF RIGHT HAND: ICD-10-CM

## 2020-05-05 DIAGNOSIS — R29.898 LEFT HAND WEAKNESS: ICD-10-CM

## 2020-05-05 DIAGNOSIS — M25.632 STIFFNESS OF LEFT WRIST JOINT: ICD-10-CM

## 2020-05-05 DIAGNOSIS — M62.81 MUSCLE WEAKNESS: ICD-10-CM

## 2020-05-05 DIAGNOSIS — M25.641 DECREASED RANGE OF MOTION OF FINGER OF RIGHT HAND: ICD-10-CM

## 2020-05-05 DIAGNOSIS — M25.642 DECREASED RANGE OF MOTION OF FINGER OF LEFT HAND: ICD-10-CM

## 2020-05-05 DIAGNOSIS — Z98.890 STATUS POST SURGERY: ICD-10-CM

## 2020-05-05 DIAGNOSIS — M25.521 PAIN IN RIGHT ELBOW: ICD-10-CM

## 2020-05-05 DIAGNOSIS — G60.9 CHRONIC IDIOPATHIC AXONAL POLYNEUROPATHY: ICD-10-CM

## 2020-05-05 PROCEDURE — 97035 APP MDLTY 1+ULTRASOUND EA 15: CPT | Mod: PO

## 2020-05-05 PROCEDURE — 97110 THERAPEUTIC EXERCISES: CPT | Mod: PO

## 2020-05-05 PROCEDURE — 97140 MANUAL THERAPY 1/> REGIONS: CPT | Mod: PO

## 2020-05-05 NOTE — PROGRESS NOTES
Occupational Therapy Treatment Note     Date: 5/5/2020  Name: Sincere Riggins  Clinic Number: [id number]    Therapy Diagnosis:   Encounter Diagnoses   Name Primary?    Left hand weakness     Decreased range of motion of finger of left hand     Weakness of right hand     Decreased range of motion of finger of right hand     Status post surgery     Pain in right elbow     Pain in right wrist     Muscle weakness     Left arm pain     Stiffness of left wrist joint     Chronic idiopathic axonal polyneuropathy      Physician: Vadim Harp Jr*    Physician Orders: Evaluate & treat  Medical Diagnosis: L CTR & CuTR and R CTR & CuTR  Surgical Procedure and Date: L CTR & CuTR, 06/07/2019; R CTR & CuTR, 03/02/20  Evaluation Date: 04/28/20  Insurance Authorization Period Expiration: 12/31/2020  Plan of Care Certification Period: 04/28/20 to 06/09/20  Date of Return to MD: LEYDA      Visit # / Visits authorized: 1 / 20  Time In: 3:05 pm  Time Out: 4:00 pm  Total Billable Time: 55 minutes (1US, 1MT, 2TE)    Precautions:  Standard      Subjective     Pt reports: No problems with initial HEP provided at last visit for her R hand. Patient reports she continues to perform HEP for her L hand, as recommended.   she was compliant with home exercise program given last session.   Response to previous treatment: less edema in R wrist/hand  Functional change: patient able to use R hand in light ADL/IADL's with minimal pain    Pain: 2/10  Location: right wrist/hand    Objective     Sincere received the following direct contact modalities after being cleared for contraindications for 10 minutes:  -3.3 MHz, .8 W/cm2, continuous, to R wrist/hand and MHP to R elbow, to decrease pain & edema, increase circulation and tissue extensibility    Sincere received the following manual therapy techniques for 15 minutes:   -Edema mgmt w/ lymphatic drainage technique;  Deep STM, including MFR, CFM & scar mgmt to R elbow/forearm/wrist/hand, using  hand techniques and IASTM tools to increase blood flow/circulation, improve soft tissue pliability and decrease pain.    Sincere received therapeutic exercises for 30 minutes including:  -TE per log:  AROM    Wrist F/E/RD/UD    TGE's-wave,hook, fist & straight fist    Thumb/pinky slides    Dexterciser 3 min, R hand only   Isospheres 3 min, R hand only    9 Peg 3 trials, B hands   Smiles & frowns 2/10 reps, Red T-bar   PHG 2/10 reps, setting 2 B hands       Tputty yellow/green       Molding 2 min, B hands      Grasp Manipulation 3 reps, 5 sec hold, B hands           Home Exercises and Education Provided     Education provided:   - Reviewed HEP  - Progress towards goals     Written Home Exercises Provided: Patient instructed to cont prior HEP.  Exercises were reviewed and Sincere was able to demonstrate them prior to the end of the session.  Sincere demonstrated good  understanding of the HEP provided.   .   See EMR under Patient Instructions for exercises provided 04/29/20.        Assessment     Patient presents with less edema and pain today. She tolerated treatment well for B elbow/wist/hand without any increase in pain or difficulty.      Sincere is progressing well towards her goals and there are no updates to goals at this time. Pt prognosis is Good.     Pt will continue to benefit from skilled outpatient occupational therapy to address the deficits listed in the problem list on initial evaluation provide pt/family education and to maximize pt's level of independence in the home and community environment.     Anticipated barriers to occupational therapy:  neuropathy    Pt's spiritual, cultural and educational needs considered and pt agreeable to plan of care and goals.    Goals:  Short Term Goals: (in 2 weeks)  1) Patient will be independent in HEP  2) Decrease pain in R elbow/wrist/hand to no more than 3-4/10 worst in ADL/IADL's  3) Increase AROM in R wrist ext by 3-5 degrees for improved functioning in ADL/IADL's  4)  Increase B  strength by 3-5 psi for increased functional use  5) Decrease edema in R elbow/wrist by .2 cm      Long Term Goals: (in 6 weeks)  1) Decrease pain in RUE to no more than 1-2/10 worst in ADL/IADL's  2) Increase AROM of B elbow/wrist/hand to WFL for increased functioning in ADL/IADL's  3) Increase strength in B  & pinch by 25% of initial measures for improved functioning in ADL/IADL's  4) Increased functioning in ADL/IADL's, as evidenced by a FOTO impairment rating of no more than 25%  5) Decrease edema in R elbow/wrist to trace or none      Plan     Certification Period/Plan of care expiration: 4/29/2020 to 06/09/20.     Outpatient Occupational Therapy 2 times weekly for 6 weeks to include the following interventions: Paraffin, Manual therapy/joint mobilizations, Modalities for pain management, US 3 mhz, Therapeutic exercises/activities., Strengthening, Orthotic Fabrication/Fit/Training, Edema Control, Scar Management, Electrical Modalities and Joint Protection. Recommend to resume OT to LUE strengthening program while patient is receiving post op aftercare for her RUE    Updates/Grading for next session: progress strengthening, as tolerated      Joseph Carlin, OT

## 2020-05-07 ENCOUNTER — CLINICAL SUPPORT (OUTPATIENT)
Dept: REHABILITATION | Facility: HOSPITAL | Age: 60
End: 2020-05-07
Payer: COMMERCIAL

## 2020-05-07 DIAGNOSIS — M79.602 LEFT ARM PAIN: ICD-10-CM

## 2020-05-07 DIAGNOSIS — M25.632 STIFFNESS OF LEFT WRIST JOINT: ICD-10-CM

## 2020-05-07 DIAGNOSIS — M25.521 PAIN IN RIGHT ELBOW: ICD-10-CM

## 2020-05-07 DIAGNOSIS — G60.9 CHRONIC IDIOPATHIC AXONAL POLYNEUROPATHY: ICD-10-CM

## 2020-05-07 DIAGNOSIS — M62.81 MUSCLE WEAKNESS: ICD-10-CM

## 2020-05-07 DIAGNOSIS — M25.531 PAIN IN RIGHT WRIST: ICD-10-CM

## 2020-05-07 PROCEDURE — 97140 MANUAL THERAPY 1/> REGIONS: CPT | Mod: PO

## 2020-05-07 PROCEDURE — 97110 THERAPEUTIC EXERCISES: CPT | Mod: PO

## 2020-05-07 PROCEDURE — 97035 APP MDLTY 1+ULTRASOUND EA 15: CPT | Mod: PO

## 2020-05-07 PROCEDURE — 97018 PARAFFIN BATH THERAPY: CPT | Mod: PO,59

## 2020-05-07 NOTE — PROGRESS NOTES
Occupational Therapy Treatment Note     Date: 5/7/2020  Name: Sincere Riggins  Clinic Number: [id number]    Therapy Diagnosis:   Encounter Diagnoses   Name Primary?    Pain in right elbow     Pain in right wrist     Muscle weakness     Left arm pain     Stiffness of left wrist joint     Chronic idiopathic axonal polyneuropathy      Physician: Vadim Harp Jr*    Physician Orders: Evaluate & treat  Medical Diagnosis: L CTR & CuTR and R CTR & CuTR  Surgical Procedure and Date: L CTR & CuTR, 06/07/2019; R CTR & CuTR, 03/02/20  Evaluation Date: 04/28/20  Insurance Authorization Period Expiration: 12/31/2020  Plan of Care Certification Period: 04/28/20 to 06/09/20  Date of Return to MD: TBD      Visit # / Visits authorized: 2 / 20  Time In: 11:05 am  Time Out: 12:00 pm  Total Billable Time: 55 minutes (1US, 1MT, 2TE)    Precautions:  Standard      Subjective     Pt reports: she continues to have pain, but it is decreasing. She also reports having difficulty with gripping and fine motor coordination activity in ADL/IADL's, but this is slowly improving R > L hand  she was compliant with home exercise program given last session.   Response to previous treatment: less edema in R wrist/hand  Functional change: patient able to use R hand in light ADL/IADL's with minimal pain    Pain: 2/10  Location: right wrist/hand    Objective     Sincere received the following direct contact modalities after being cleared for contraindications for 10 minutes:  -3.3 MHz, .8 W/cm2, continuous, to R wrist/hand and MHP to R elbow, to decrease pain & edema, increase circulation and tissue extensibility    Sincere received the following supervised modalities after being cleared for contraindications for 10 minutes: (simultaneously performed with R hand ultrasound)  -Paraffin w/ MHP to L wrist/hand, pre-tx to decrease pain & increase tissue extensibility    Sincere received the following manual therapy techniques for 15 minutes:   -Edema mgmt  w/ lymphatic drainage technique;  Deep STM, including MFR, CFM & scar mgmt to R elbow/forearm/wrist/hand, using hand techniques and IASTM tools to increase blood flow/circulation, improve soft tissue pliability and decrease pain.    Sincere received therapeutic exercises for 30 minutes including:  -TE per log:  Dexterciser 3 min, R hand only   Isospheres 3 min, R hand only    9 Peg 3 trials, B hands   Smiles & frowns 2/10 reps, Red T-bar, B hands   PHG 3/10 reps, setting 2 B hands       Tputty yellow/green    -Molding 2 min, B hands   -Grasp Manipulation 3 reps, 5 sec hold, B hands   -Intrinsic cones 3 reps, R hand only       Home Exercises and Education Provided     Education provided:   - Reviewed HEP  - Progress towards goals     Written Home Exercises Provided: Patient instructed to cont prior HEP.  Exercises were reviewed and Sincere was able to demonstrate them prior to the end of the session.  Sincere demonstrated good  understanding of the HEP provided.   .   See EMR under Patient Instructions for exercises provided 04/29/20.        Assessment     Patient's pain continues to decrease with each treatment.  Edema continues to decrease as well. Scar tissue is moderately dense in R hand without adhesions.  She continues to tolerate treatment well for B elbow/wist/hand without any increase in pain or difficulty.      Sincere is progressing well towards her goals and there are no updates to goals at this time. Pt prognosis is Good.     Pt will continue to benefit from skilled outpatient occupational therapy to address the deficits listed in the problem list on initial evaluation provide pt/family education and to maximize pt's level of independence in the home and community environment.     Anticipated barriers to occupational therapy:  neuropathy    Pt's spiritual, cultural and educational needs considered and pt agreeable to plan of care and goals.    Goals:  Short Term Goals: (in 2 weeks)  1) Patient will be independent in  HEP  2) Decrease pain in R elbow/wrist/hand to no more than 3-4/10 worst in ADL/IADL's  3) Increase AROM in R wrist ext by 3-5 degrees for improved functioning in ADL/IADL's  4) Increase B  strength by 3-5 psi for increased functional use  5) Decrease edema in R elbow/wrist by .2 cm      Long Term Goals: (in 6 weeks)  1) Decrease pain in RUE to no more than 1-2/10 worst in ADL/IADL's  2) Increase AROM of B elbow/wrist/hand to WFL for increased functioning in ADL/IADL's  3) Increase strength in B  & pinch by 25% of initial measures for improved functioning in ADL/IADL's  4) Increased functioning in ADL/IADL's, as evidenced by a FOTO impairment rating of no more than 25%  5) Decrease edema in R elbow/wrist to trace or none      Plan     Certification Period/Plan of care expiration: 4/29/2020 to 06/09/20.     Outpatient Occupational Therapy 2 times weekly for 6 weeks to include the following interventions: Paraffin, Manual therapy/joint mobilizations, Modalities for pain management, US 3 mhz, Therapeutic exercises/activities., Strengthening, Orthotic Fabrication/Fit/Training, Edema Control, Scar Management, Electrical Modalities and Joint Protection. Recommend to resume OT to LUE strengthening program while patient is receiving post op aftercare for her RUE    Updates/Grading for next session: progress strengthening, as tolerated      Joseph Carlin, OT

## 2020-05-12 ENCOUNTER — CLINICAL SUPPORT (OUTPATIENT)
Dept: REHABILITATION | Facility: HOSPITAL | Age: 60
End: 2020-05-12
Payer: COMMERCIAL

## 2020-05-12 DIAGNOSIS — M25.531 PAIN IN RIGHT WRIST: ICD-10-CM

## 2020-05-12 DIAGNOSIS — M25.632 STIFFNESS OF LEFT WRIST JOINT: ICD-10-CM

## 2020-05-12 DIAGNOSIS — M79.602 LEFT ARM PAIN: ICD-10-CM

## 2020-05-12 DIAGNOSIS — G60.9 CHRONIC IDIOPATHIC AXONAL POLYNEUROPATHY: ICD-10-CM

## 2020-05-12 DIAGNOSIS — M62.81 MUSCLE WEAKNESS: ICD-10-CM

## 2020-05-12 DIAGNOSIS — M25.521 PAIN IN RIGHT ELBOW: ICD-10-CM

## 2020-05-12 PROCEDURE — 97140 MANUAL THERAPY 1/> REGIONS: CPT | Mod: PO

## 2020-05-12 PROCEDURE — 97110 THERAPEUTIC EXERCISES: CPT | Mod: PO

## 2020-05-12 PROCEDURE — 97035 APP MDLTY 1+ULTRASOUND EA 15: CPT | Mod: PO

## 2020-05-12 NOTE — PROGRESS NOTES
"  Occupational Therapy Treatment Note     Date: 5/12/2020  Name: Sincere Riggins  Clinic Number: [id number]    Therapy Diagnosis:   Encounter Diagnoses   Name Primary?    Pain in right elbow     Pain in right wrist     Muscle weakness     Left arm pain     Stiffness of left wrist joint     Chronic idiopathic axonal polyneuropathy      Physician: Vadim Harp Jr*    Physician Orders: Evaluate & treat  Medical Diagnosis: L CTR & CuTR and R CTR & CuTR  Surgical Procedure and Date: L CTR & CuTR, 06/07/2019; R CTR & CuTR, 03/02/20  Evaluation Date: 04/28/20  Insurance Authorization Period Expiration: 12/31/2020  Plan of Care Certification Period: 04/28/20 to 06/09/20  Date of Return to MD: TBD      Visit # / Visits authorized: 3 / 20  Time In: 3:00 pm  Time Out: 4:00 pm  Total Billable Time: 60 minutes (1US, 1MT, 2TE)    Precautions:  Standard      Subjective     Pt reports: "The pain depends on how I place my hand down and where the pressure is placed"  she was compliant with home exercise program given last session.   Response to previous treatment: less edema in R wrist/hand  Functional change: patient able to use R hand in light ADL/IADL's with minimal pain    Pain: 2/10  Location: right wrist/hand    Objective     Sincere received the following direct contact modalities after being cleared for contraindications while patient received Ultrasound:  -3.3 MHz, .8 W/cm2, continuous, to R wrist/hand and MHP to R elbow, to decrease pain & edema, increase circulation and tissue extensibility    Sincere received the following supervised modalities after being cleared for contraindications for 10 minutes: (simultaneously performed with R hand ultrasound)  -Paraffin w/ MHP to L wrist/hand, pre-tx to decrease pain & increase tissue extensibility    Sincere received the following manual therapy techniques for 20 minutes:   -Edema mgmt w/ lymphatic drainage technique;  Deep STM, including MFR, CFM & scar mgmt to R " elbow/forearm/wrist/hand, using hand techniques and IASTM tools to increase blood flow/circulation, improve soft tissue pliability and decrease pain.    Sincere received therapeutic exercises for 30 minutes including:  Dexterciser 3 min, R hand only   Isospheres 3 min, R hand only    9 Peg 3 trials, B hands   Smiles & frowns 2/15 reps, Red T-bar, B hands   PHG 3/10 reps, setting 2 B hands   Elbow 3 ways-full arc motion 10 reps each, 1# wt       Tputty Yellow/green   -Molding 2 min, B hands   -Grasp Manipulation 3 reps, 5 sec hold, B hands   -Intrinsic cones 3 reps, R hand only   -dowel digs 2 min       Home Exercises and Education Provided     Education provided:   - Reviewed HEP  - Progress towards goals     Written Home Exercises Provided: Patient instructed to cont prior HEP.  Exercises were reviewed and Sincere was able to demonstrate them prior to the end of the session.  Sincere demonstrated good  understanding of the HEP provided.   .   See EMR under Patient Instructions for exercises provided 04/29/20.        Assessment     Patient noted to have moderate scar tissue density at her R wrist surgical site, extending to the ulnar aspect of her wrist/hand.  She did have some complaints of increased pain in that area, but did decrease after modalities and manual therapy.  Scar tissue density at her R elbow was minimal without adhesions. Edema continues to decrease and remains at minimal at both surgical sites of RUE.  Strength and activity tolerance is improving in B hands.  She continues to tolerate treatment well for B elbow/wist/hand without any increase in pain or difficulty.      Sincere is progressing well towards her goals and there are no updates to goals at this time. Pt prognosis is Good.     Pt will continue to benefit from skilled outpatient occupational therapy to address the deficits listed in the problem list on initial evaluation provide pt/family education and to maximize pt's level of independence in the  home and community environment.     Anticipated barriers to occupational therapy:  neuropathy    Pt's spiritual, cultural and educational needs considered and pt agreeable to plan of care and goals.    Goals:  Short Term Goals: (in 2 weeks)  1) Patient will be independent in HEP  2) Decrease pain in R elbow/wrist/hand to no more than 3-4/10 worst in ADL/IADL's  3) Increase AROM in R wrist ext by 3-5 degrees for improved functioning in ADL/IADL's  4) Increase B  strength by 3-5 psi for increased functional use  5) Decrease edema in R elbow/wrist by .2 cm      Long Term Goals: (in 6 weeks)  1) Decrease pain in RUE to no more than 1-2/10 worst in ADL/IADL's  2) Increase AROM of B elbow/wrist/hand to WFL for increased functioning in ADL/IADL's  3) Increase strength in B  & pinch by 25% of initial measures for improved functioning in ADL/IADL's  4) Increased functioning in ADL/IADL's, as evidenced by a FOTO impairment rating of no more than 25%  5) Decrease edema in R elbow/wrist to trace or none      Plan     Certification Period/Plan of care expiration: 4/29/2020 to 06/09/20.     Outpatient Occupational Therapy 2 times weekly for 6 weeks to include the following interventions: Paraffin, Manual therapy/joint mobilizations, Modalities for pain management, US 3 mhz, Therapeutic exercises/activities., Strengthening, Orthotic Fabrication/Fit/Training, Edema Control, Scar Management, Electrical Modalities and Joint Protection. Recommend to resume OT to LUE strengthening program while patient is receiving post op aftercare for her RUE    Updates/Grading for next session: progress strengthening, as tolerated      Joseph Carlin, OT

## 2020-05-14 ENCOUNTER — CLINICAL SUPPORT (OUTPATIENT)
Dept: REHABILITATION | Facility: HOSPITAL | Age: 60
End: 2020-05-14
Payer: COMMERCIAL

## 2020-05-14 DIAGNOSIS — M25.531 PAIN IN RIGHT WRIST: ICD-10-CM

## 2020-05-14 DIAGNOSIS — M25.521 PAIN IN RIGHT ELBOW: ICD-10-CM

## 2020-05-14 DIAGNOSIS — G60.9 CHRONIC IDIOPATHIC AXONAL POLYNEUROPATHY: ICD-10-CM

## 2020-05-14 DIAGNOSIS — M79.602 LEFT ARM PAIN: ICD-10-CM

## 2020-05-14 DIAGNOSIS — M25.632 STIFFNESS OF LEFT WRIST JOINT: ICD-10-CM

## 2020-05-14 DIAGNOSIS — M62.81 MUSCLE WEAKNESS: ICD-10-CM

## 2020-05-14 PROCEDURE — 97035 APP MDLTY 1+ULTRASOUND EA 15: CPT | Mod: PO

## 2020-05-14 PROCEDURE — 97110 THERAPEUTIC EXERCISES: CPT | Mod: PO

## 2020-05-14 PROCEDURE — 97018 PARAFFIN BATH THERAPY: CPT | Mod: PO,59

## 2020-05-14 PROCEDURE — 97140 MANUAL THERAPY 1/> REGIONS: CPT | Mod: PO

## 2020-05-14 NOTE — PROGRESS NOTES
Occupational Therapy Treatment Note     Date: 5/14/2020  Name: Sincere Riggins  Clinic Number: [id number]    Therapy Diagnosis:   Encounter Diagnoses   Name Primary?    Pain in right elbow     Pain in right wrist     Muscle weakness     Left arm pain     Stiffness of left wrist joint     Chronic idiopathic axonal polyneuropathy      Physician: Vadim Harp Jr*    Physician Orders: Evaluate & treat  Medical Diagnosis: L CTR & CuTR and R CTR & CuTR  Surgical Procedure and Date: L CTR & CuTR, 06/07/2019; R CTR & CuTR, 03/02/20  Evaluation Date: 04/28/20  Insurance Authorization Period Expiration: 12/31/2020  Plan of Care Certification Period: 04/28/20 to 06/09/20  Date of Return to MD: ARNULFOD      Visit # / Visits authorized: 4 / 20  Time In: 11:15 am  Time Out: 12:00 pm  Total Billable Time: 45 minutes (P, 1US, 1MT, 1TE)    Precautions:  Standard      Subjective     Pt reports: she continues to have pain in the ulnar aspect of her R wrist/hand, but the intensity level fluctuates on a daily basis  she was compliant with home exercise program given last session.   Response to previous treatment: less edema in R wrist/hand  Functional change: patient able to use R hand in light ADL/IADL's with minimal pain    Pain: 1-2/10  Location: right wrist/hand    Objective     Sincere received the following direct contact modalities after being cleared for contraindications for 10 minutes:   -3.3 MHz, .8 W/cm2, continuous, to R wrist/hand, to decrease pain & edema, increase circulation and tissue extensibility    Sincere received the following supervised modalities after being cleared for contraindications, simultaneously performed with R hand ultrasound:   -Paraffin w/ MHP to L wrist/hand, pre-tx to decrease pain & increase tissue extensibility    Sincere received the following manual therapy techniques for 20 minutes:   -Edema mgmt w/ lymphatic drainage technique;  Deep STM, including MFR, CFM & scar mgmt to R  elbow/forearm/wrist/hand, using hand techniques and IASTM tools to increase blood flow/circulation, improve soft tissue pliability and decrease pain.  -Kinesiotaping: Ulnar nerve decompression taping pattern applied to R wrist/hand to upper arm with a space correction at the wrist/hand for pain, edema and scar management. Patient instructed on purpose, wear, care, precautions to monitor and removal of KT. Patient verbalized understanding of all instructions provided.      Sincere received therapeutic exercises for 15 minutes including:  Dexterciser 3 min, R hand only   Isospheres 3 min, R hand only    9 Peg 3 trials, B hands   Smiles & frowns 2/15 reps, Red T-bar, B hands   Elbow 3 ways-full arc motion 15  reps each, 2# wt           Home Exercises and Education Provided     Education provided:   - Reviewed HEP  - Progress towards goals     Written Home Exercises Provided: Patient instructed to cont prior HEP.  Exercises were reviewed and Sincere was able to demonstrate them prior to the end of the session.  Sincere demonstrated good  understanding of the HEP provided.     See EMR under Patient Instructions for exercises provided 04/29/20.        Assessment     Scar tissue density has decreased slightly this week and is currently minimal at her R elbow and moderate to minimal at R wrist/hhand surgical site.  Pain and edema continues to decrease with each visit as well.  Patient continues to tolerate treatment well for B elbow/wist/hand without any increase in pain or difficulty.      Sincere is progressing well towards her goals and there are no updates to goals at this time. Pt prognosis is Good.     Pt will continue to benefit from skilled outpatient occupational therapy to address the deficits listed in the problem list on initial evaluation provide pt/family education and to maximize pt's level of independence in the home and community environment.     Anticipated barriers to occupational therapy:  neuropathy    Pt's  spiritual, cultural and educational needs considered and pt agreeable to plan of care and goals.    Goals:  Short Term Goals: (in 2 weeks)  1) Patient will be independent in HEP  2) Decrease pain in R elbow/wrist/hand to no more than 3-4/10 worst in ADL/IADL's  3) Increase AROM in R wrist ext by 3-5 degrees for improved functioning in ADL/IADL's  4) Increase B  strength by 3-5 psi for increased functional use  5) Decrease edema in R elbow/wrist by .2 cm      Long Term Goals: (in 6 weeks)  1) Decrease pain in RUE to no more than 1-2/10 worst in ADL/IADL's  2) Increase AROM of B elbow/wrist/hand to WFL for increased functioning in ADL/IADL's  3) Increase strength in B  & pinch by 25% of initial measures for improved functioning in ADL/IADL's  4) Increased functioning in ADL/IADL's, as evidenced by a FOTO impairment rating of no more than 25%  5) Decrease edema in R elbow/wrist to trace or none      Plan     Certification Period/Plan of care expiration: 4/29/2020 to 06/09/20.     Outpatient Occupational Therapy 2 times weekly for 6 weeks to include the following interventions: Paraffin, Manual therapy/joint mobilizations, Modalities for pain management, US 3 mhz, Therapeutic exercises/activities., Strengthening, Orthotic Fabrication/Fit/Training, Edema Control, Scar Management, Electrical Modalities and Joint Protection. Recommend to resume OT to LUE strengthening program while patient is receiving post op aftercare for her RUE    Updates/Grading for next session: progress strengthening, as tolerated      Joseph Carlin, OT

## 2020-05-15 ENCOUNTER — OFFICE VISIT (OUTPATIENT)
Dept: ORTHOPEDICS | Facility: CLINIC | Age: 60
End: 2020-05-15
Payer: COMMERCIAL

## 2020-05-15 VITALS
HEIGHT: 64 IN | DIASTOLIC BLOOD PRESSURE: 78 MMHG | SYSTOLIC BLOOD PRESSURE: 117 MMHG | HEART RATE: 73 BPM | BODY MASS INDEX: 21.85 KG/M2 | WEIGHT: 128 LBS

## 2020-05-15 DIAGNOSIS — Z98.890 STATUS POST SURGERY: Primary | ICD-10-CM

## 2020-05-15 PROCEDURE — 99024 POSTOP FOLLOW-UP VISIT: CPT | Mod: S$GLB,,, | Performed by: PLASTIC SURGERY

## 2020-05-15 PROCEDURE — 99024 PR POST-OP FOLLOW-UP VISIT: ICD-10-PCS | Mod: S$GLB,,, | Performed by: PLASTIC SURGERY

## 2020-05-15 PROCEDURE — 99999 PR PBB SHADOW E&M-EST. PATIENT-LVL III: CPT | Mod: PBBFAC,,, | Performed by: PLASTIC SURGERY

## 2020-05-15 PROCEDURE — 99999 PR PBB SHADOW E&M-EST. PATIENT-LVL III: ICD-10-PCS | Mod: PBBFAC,,, | Performed by: PLASTIC SURGERY

## 2020-05-15 NOTE — PROGRESS NOTES
"Ms. Riggins is here today for a post-operative visit.she is 10 weeks status post right ulnar nerve release and anterior transposition and right carpal tunnel release. she reports that she is doing well.  Pain is much improved.  She has all only been the 2 weeks of therapy due to the COVID-19 outbreak.  She states that sensation in the small finger of the right hand has improved however she continues to have numbness over the dorsal ulnar aspect of the hand.  She also feels the strength and hand is improved somewhat since surgery.    Physical exam:    Vitals:    05/15/20 1038   BP: 117/78   Pulse: 73   Weight: 58.1 kg (128 lb)   Height: 5' 4" (1.626 m)   PainSc:   2     Right upper extremity: Incisions are well healed, sensation intact to light touch in the ulnar distribution compared to the median distribution.  Mild clawing of the ring and small digits, intrinsics intact however mildly weak good opposition decreased sensation over the dorsal ulnar aspect of the hand within the dorsal cutaneous distribution.  Good capillary refill  Left upper extremity:  Intrinsic wasting there is motion of the F DI muscle.  Sensation intact in the median radial ulnar distributions.  Assessment:  Status post surgery  Plan:  Sincere was seen today for post-op evaluation.    Diagnoses and all orders for this visit:    Status post surgery        - the patient expresses much improved pain after release of the right upper extremity.  This is a good sign that the surgery was successful.  I discussed that it will take some time for the sensation changes to return as well as the muscles to strengthen.  She is encouraged to continue with therapy for both upper extremities at this time.  The left upper extremity still demonstrates muscle atrophy which may be a permanent complication of the nerve compression.  However on exam she is demonstrating 1st dorsal interossei motion of the index finger.  This is a good indication that she is beginning to " develop muscle strengthening.  I discussed the possibility of a future EMG to assess both upper extremities for improvement.  For now would like the patient to continue with therapy and follow up in 3 months.

## 2020-05-19 ENCOUNTER — CLINICAL SUPPORT (OUTPATIENT)
Dept: REHABILITATION | Facility: HOSPITAL | Age: 60
End: 2020-05-19
Payer: COMMERCIAL

## 2020-05-19 DIAGNOSIS — M79.602 LEFT ARM PAIN: ICD-10-CM

## 2020-05-19 DIAGNOSIS — M25.521 PAIN IN RIGHT ELBOW: ICD-10-CM

## 2020-05-19 DIAGNOSIS — M25.632 STIFFNESS OF LEFT WRIST JOINT: ICD-10-CM

## 2020-05-19 DIAGNOSIS — M62.81 MUSCLE WEAKNESS: ICD-10-CM

## 2020-05-19 DIAGNOSIS — G60.9 CHRONIC IDIOPATHIC AXONAL POLYNEUROPATHY: ICD-10-CM

## 2020-05-19 DIAGNOSIS — M25.531 PAIN IN RIGHT WRIST: ICD-10-CM

## 2020-05-19 PROCEDURE — 97110 THERAPEUTIC EXERCISES: CPT | Mod: PO

## 2020-05-19 PROCEDURE — 97140 MANUAL THERAPY 1/> REGIONS: CPT | Mod: PO

## 2020-05-19 PROCEDURE — 97018 PARAFFIN BATH THERAPY: CPT | Mod: PO,59

## 2020-05-19 NOTE — PROGRESS NOTES
Occupational Therapy Treatment Note     Date: 5/19/2020  Name: Sincere Riggins  Clinic Number: [id number]    Therapy Diagnosis:   Encounter Diagnoses   Name Primary?    Pain in right elbow     Pain in right wrist     Muscle weakness     Left arm pain     Stiffness of left wrist joint     Chronic idiopathic axonal polyneuropathy      Physician: Vadim Hrap Jr*    Physician Orders: Evaluate & treat  Medical Diagnosis: L CTR & CuTR and R CTR & CuTR  Surgical Procedure and Date: L CTR & CuTR, 06/07/2019; R CTR & CuTR, 03/02/20  Evaluation Date: 04/28/20  Insurance Authorization Period Expiration: 12/31/2020  Plan of Care Certification Period: 04/28/20 to 06/09/20  Date of Return to MD: TBD      Visit # / Visits authorized: 5 / 20  Time In: 2:40 pm  Time Out: 3:15 pm  Total Billable Time: 35 minutes (P, 1MT, 1TE)    Precautions:  Standard      Subjective     Pt reports:  Her pain is less today, however, she is having an increase in pain in her R SF from MCP-DIP  she was compliant with home exercise program given last session.   Response to previous treatment: less edema in R wrist/hand  Functional change: patient able to use R hand in light ADL/IADL's with minimal pain    Pain: 1-2/10  Location: right wrist/hand    Objective     Sincere Post received the following supervised modalities after being cleared for contraindications for 8 minutes:   -Paraffin w/ MHP to B wrist/hand, pre-tx to decrease pain & increase tissue extensibility    Sincere received the following manual therapy techniques for 12 minutes:   -Edema mgmt w/ lymphatic drainage technique;  Deep STM, including MFR, CFM & scar mgmt to R elbow/forearm/wrist/hand, using hand techniques and IASTM tools to increase blood flow/circulation, improve soft tissue pliability and decrease pain.  -      Sincere received therapeutic exercises for 15 minutes including:  Dexterciser 3 min, R hand only   Isospheres 3 min, R hand only    9 Peg 31trial, R hand only    Smiles & frowns 10 reps, Green T-bar, B hands   PHG 4/10 reps, setting 2 B hands   Elbow 3 ways-full arc motion 2/10  reps each, 2# wt   Constant Force finger extension 2/10 reps, green       Home Exercises and Education Provided     Education provided:   - Reviewed HEP  - Progress towards goals     Written Home Exercises Provided: Patient instructed to cont prior HEP.  Exercises were reviewed and Sincere was able to demonstrate them prior to the end of the session.  Sincere demonstrated good  understanding of the HEP provided.     See EMR under Patient Instructions for exercises provided 04/29/20.        Assessment     Treatment was shortened today due to patient arriving to appointment late today.  Scar tissue density continues to decrease at both surgical sites in R elbow and wrist.  Edema is minimal to trace as well in R elbow/wrist/hand.  Pain continues to decrease with each treatment, however, new onset of increased pain in her R SF may be due to ulnar nerve regeneration.   Patient continues to tolerate treatment well for B elbow/wist/hand without any increase in pain or difficulty.      Sincere is progressing well towards her goals and there are no updates to goals at this time. Pt prognosis is Good.     Pt will continue to benefit from skilled outpatient occupational therapy to address the deficits listed in the problem list on initial evaluation provide pt/family education and to maximize pt's level of independence in the home and community environment.     Anticipated barriers to occupational therapy:  neuropathy    Pt's spiritual, cultural and educational needs considered and pt agreeable to plan of care and goals.    Goals:  Short Term Goals: (in 2 weeks)  1) Patient will be independent in HEP  2) Decrease pain in R elbow/wrist/hand to no more than 3-4/10 worst in ADL/IADL's  3) Increase AROM in R wrist ext by 3-5 degrees for improved functioning in ADL/IADL's  4) Increase B  strength by 3-5 psi for  increased functional use  5) Decrease edema in R elbow/wrist by .2 cm      Long Term Goals: (in 6 weeks)  1) Decrease pain in RUE to no more than 1-2/10 worst in ADL/IADL's  2) Increase AROM of B elbow/wrist/hand to WFL for increased functioning in ADL/IADL's  3) Increase strength in B  & pinch by 25% of initial measures for improved functioning in ADL/IADL's  4) Increased functioning in ADL/IADL's, as evidenced by a FOTO impairment rating of no more than 25%  5) Decrease edema in R elbow/wrist to trace or none      Plan     Certification Period/Plan of care expiration: 4/29/2020 to 06/09/20.     Outpatient Occupational Therapy 2 times weekly for 6 weeks to include the following interventions: Paraffin, Manual therapy/joint mobilizations, Modalities for pain management, US 3 mhz, Therapeutic exercises/activities., Strengthening, Orthotic Fabrication/Fit/Training, Edema Control, Scar Management, Electrical Modalities and Joint Protection. Recommend to resume OT to LUE strengthening program while patient is receiving post op aftercare for her RUE    Updates/Grading for next session: progress strengthening, as tolerated      Joseph Carlin, OT

## 2020-05-21 ENCOUNTER — CLINICAL SUPPORT (OUTPATIENT)
Dept: REHABILITATION | Facility: HOSPITAL | Age: 60
End: 2020-05-21
Payer: COMMERCIAL

## 2020-05-21 DIAGNOSIS — M25.632 STIFFNESS OF LEFT WRIST JOINT: ICD-10-CM

## 2020-05-21 DIAGNOSIS — M25.521 PAIN IN RIGHT ELBOW: ICD-10-CM

## 2020-05-21 DIAGNOSIS — M79.602 LEFT ARM PAIN: ICD-10-CM

## 2020-05-21 DIAGNOSIS — M25.531 PAIN IN RIGHT WRIST: ICD-10-CM

## 2020-05-21 DIAGNOSIS — M62.81 MUSCLE WEAKNESS: ICD-10-CM

## 2020-05-21 DIAGNOSIS — G60.9 CHRONIC IDIOPATHIC AXONAL POLYNEUROPATHY: ICD-10-CM

## 2020-05-21 PROCEDURE — 97035 APP MDLTY 1+ULTRASOUND EA 15: CPT | Mod: PO

## 2020-05-21 PROCEDURE — 97140 MANUAL THERAPY 1/> REGIONS: CPT | Mod: PO

## 2020-05-21 PROCEDURE — 97018 PARAFFIN BATH THERAPY: CPT | Mod: PO

## 2020-05-21 PROCEDURE — 97110 THERAPEUTIC EXERCISES: CPT | Mod: PO

## 2020-05-21 NOTE — PROGRESS NOTES
Occupational Therapy Treatment Note     Date: 5/21/2020  Name: Sincere Riggins  Clinic Number: [id number]    Therapy Diagnosis:   Encounter Diagnoses   Name Primary?    Pain in right elbow     Pain in right wrist     Muscle weakness     Left arm pain     Stiffness of left wrist joint     Chronic idiopathic axonal polyneuropathy      Physician: Vadim Harp Jr*    Physician Orders: Evaluate & treat  Medical Diagnosis: L CTR & CuTR and R CTR & CuTR  Surgical Procedure and Date: L CTR & CuTR, 06/07/2019; R CTR & CuTR, 03/02/20  Evaluation Date: 04/28/20  Insurance Authorization Period Expiration: 12/31/2020  Plan of Care Certification Period: 04/28/20 to 06/09/20  Date of Return to MD: TBD      Visit # / Visits authorized: 6 / 20  Time In: 2:30 pm  Time Out: 3:15 pm  Total Billable Time: 45 minutes (1US, P, 1MT, 1TE)    Precautions:  Standard      Subjective     Pt reports:  pain in her R SF from MCP-DIP is less today compared to last treatment session  she was compliant with home exercise program.    Response to previous treatment: increased R elbow/wrist/hand activity tolerance and strength  Functional change: patient able to use R hand in light ADL/IADL's without pain    Pain:  0-1/10  Location: right wrist/hand    Objective     Sincere eceived the following direct contact modalities after being cleared for contraindications for 10 minutes:   -3.3 MHz, .8 W/cm2, continuous, to R wrist/hand, to decrease pain & edema, increase circulation and tissue extensibility    Sincere received the following supervised modalities after being cleared for contraindications, simultaneously performed with Ultrasound:  -Paraffin w/ MHP to B wrist/hand, pre-tx to decrease pain & increase tissue extensibility    Sincere received the following manual therapy techniques for 20 minutes:   -Edema mgmt w/ lymphatic drainage technique;  Deep STM, including MFR, CFM & scar mgmt to R elbow/forearm/wrist/hand, using hand techniques and IASTM  tools to increase blood flow/circulation, improve soft tissue pliability and decrease pain.  -Kinesiotaping: Ulnar nerve decompression taping pattern applied to R wrist/hand to upper arm with a space correction at the wrist/hand for pain, edema and scar management. Patient instructed on purpose, wear, care, precautions to monitor and removal of KT. Patient verbalized understanding of all instructions provided.      Sincere received therapeutic exercises for 15 minutes including:  Dexterciser 3 min, R hand only   Isospheres 3 min, R hand only    9 Peg 31trial, R hand only   Smiles & frowns 2/10 reps, Green T-bar, B hands   PHG 5/10 reps, setting 2 B hands   Elbow 3 ways-full arc motion 2/10  reps each, 2# wt   Constant Force finger extension 2/10 reps, green       Home Exercises and Education Provided     Education provided:   - Reviewed HEP  - Progress towards goals     Written Home Exercises Provided: Patient instructed to cont prior HEP.  Exercises were reviewed and Sincere was able to demonstrate them prior to the end of the session.  Sincere demonstrated good  understanding of the HEP provided.     See EMR under Patient Instructions for exercises provided 04/29/20.        Assessment     Patient's pain level continues to be low and edema is minimal to trace in R elbow/wrist/hand.  Scar tissue density is minimal without adhesions at both surgical sites in R elbow and wrist.  Patient continues to tolerate treatment well for B elbow/wist/hand without any increase in pain or difficulty.      Sincere is progressing well towards her goals and there are no updates to goals at this time. Pt prognosis is Good.     Pt will continue to benefit from skilled outpatient occupational therapy to address the deficits listed in the problem list on initial evaluation provide pt/family education and to maximize pt's level of independence in the home and community environment.     Anticipated barriers to occupational therapy:  neuropathy    Pt's  spiritual, cultural and educational needs considered and pt agreeable to plan of care and goals.    Goals:  Short Term Goals: (in 2 weeks)  1) Patient will be independent in HEP  2) Decrease pain in R elbow/wrist/hand to no more than 3-4/10 worst in ADL/IADL's  3) Increase AROM in R wrist ext by 3-5 degrees for improved functioning in ADL/IADL's  4) Increase B  strength by 3-5 psi for increased functional use  5) Decrease edema in R elbow/wrist by .2 cm      Long Term Goals: (in 6 weeks)  1) Decrease pain in RUE to no more than 1-2/10 worst in ADL/IADL's  2) Increase AROM of B elbow/wrist/hand to WFL for increased functioning in ADL/IADL's  3) Increase strength in B  & pinch by 25% of initial measures for improved functioning in ADL/IADL's  4) Increased functioning in ADL/IADL's, as evidenced by a FOTO impairment rating of no more than 25%  5) Decrease edema in R elbow/wrist to trace or none      Plan     Certification Period/Plan of care expiration: 4/29/2020 to 06/09/20.     Outpatient Occupational Therapy 2 times weekly for 6 weeks to include the following interventions: Paraffin, Manual therapy/joint mobilizations, Modalities for pain management, US 3 mhz, Therapeutic exercises/activities., Strengthening, Orthotic Fabrication/Fit/Training, Edema Control, Scar Management, Electrical Modalities and Joint Protection. Recommend to resume OT to LUE strengthening program while patient is receiving post op aftercare for her RUE    Updates/Grading for next session: progress strengthening, as tolerated      Joseph Carlin, OT

## 2020-05-26 ENCOUNTER — CLINICAL SUPPORT (OUTPATIENT)
Dept: REHABILITATION | Facility: HOSPITAL | Age: 60
End: 2020-05-26
Payer: COMMERCIAL

## 2020-05-26 DIAGNOSIS — M25.521 PAIN IN RIGHT ELBOW: ICD-10-CM

## 2020-05-26 DIAGNOSIS — M25.531 PAIN IN RIGHT WRIST: ICD-10-CM

## 2020-05-26 DIAGNOSIS — M62.81 MUSCLE WEAKNESS: ICD-10-CM

## 2020-05-26 PROCEDURE — 97018 PARAFFIN BATH THERAPY: CPT | Mod: PO,59

## 2020-05-26 PROCEDURE — 97035 APP MDLTY 1+ULTRASOUND EA 15: CPT | Mod: PO

## 2020-05-26 PROCEDURE — 97110 THERAPEUTIC EXERCISES: CPT | Mod: PO

## 2020-05-26 PROCEDURE — 97140 MANUAL THERAPY 1/> REGIONS: CPT | Mod: PO

## 2020-05-26 NOTE — PROGRESS NOTES
"  Occupational Therapy Treatment Note     Date: 5/26/2020  Name: Sincere Riggins  Clinic Number: [id number]    Therapy Diagnosis:   Encounter Diagnoses   Name Primary?    Pain in right elbow     Pain in right wrist     Muscle weakness      Physician: Vadim Harp Jr*    Physician Orders: Evaluate & treat  Medical Diagnosis: L CTR & CuTR and R CTR & CuTR  Surgical Procedure and Date: L CTR & CuTR, 06/07/2019; R CTR & CuTR, 03/02/20  Evaluation Date: 04/28/20  Insurance Authorization Period Expiration: 12/31/2020  Plan of Care Certification Period: 04/28/20 to 06/09/20  Date of Return to MD: TBD      Visit # / Visits authorized: 7 / 20  Time In: 2:40 pm  Time Out: 3:15 pm  Total Billable Time: 35 minutes (1US, P, 1MT, 1TE)    Precautions:  Standard      Subjective     Pt reports: she has been having numbness and "deadness" feeling over the radial aspect of her R hand & SF. She reports it is constant and has been present since her surgery.   she was compliant with home exercise program.    Response to previous treatment: increased R elbow/wrist/hand activity tolerance and strength  Functional change: patient able to use R hand in light ADL/IADL's without pain    Pain:  0-1/10  Location: right wrist/hand    Objective     Sincere eceived the following direct contact modalities after being cleared for contraindications for 10 minutes:   -3.3 MHz, .8 W/cm2, continuous, to R wrist/hand, to decrease pain & edema, increase circulation and tissue extensibility    Sincere received the following supervised modalities after being cleared for contraindications, simultaneously performed with Ultrasound:  -Paraffin w/ MHP to B wrist/hand, pre-tx to decrease pain & increase tissue extensibility    Sincere received the following manual therapy techniques for 12 minutes:   -Edema mgmt w/ lymphatic drainage technique;  Deep STM, including MFR, CFM & scar mgmt to R elbow/forearm/wrist/hand, using hand techniques and IASTM tools to " increase blood flow/circulation, improve soft tissue pliability and decrease pain.  -.      Sincere received therapeutic exercises for 13 minutes including:  Forearm stretches-2 ways 3 reps, 10-15 sec hiold    Radial Nerve Glides 3 trials   Dexterciser 3 min, R hand only   Isospheres 3 min, R hand only    Smiles & frowns 2/10 reps, Green T-bar, B hands       Home Exercises and Education Provided     Education provided:   - Updated HEP to include forearm stretches and radial nerve glides/stretches  - Progress towards goals     Written Home Exercises Provided: Patient instructed to cont prior HEP.  Exercises were reviewed and Sincere was able to demonstrate them prior to the end of the session.  Sincere demonstrated good  understanding of the HEP provided.     See EMR under Patient Instructions for exercises provided 04/29/20.        Assessment     Pain level remains low, but patient is reporting continued symptom of numbness in her R RF in the radial nerve distribution.  She was noted to have R forearm muscle tightness in the extensors which can possibly contribute to radial nerve compression. Recommend to perform radial nerve glides/stretches and forearm stretches in attempt to address radial nerve compression symptoms.  Scar tissue density remains minimal without adhesions at both surgical sites in R elbow and wrist.  Patient continues to tolerate treatment well for B elbow/wist/hand without any increase in pain or difficulty.      Sincere is progressing well towards her goals and there are no updates to goals at this time. Pt prognosis is Good.     Pt will continue to benefit from skilled outpatient occupational therapy to address the deficits listed in the problem list on initial evaluation provide pt/family education and to maximize pt's level of independence in the home and community environment.     Anticipated barriers to occupational therapy:  neuropathy    Pt's spiritual, cultural and educational needs considered and  pt agreeable to plan of care and goals.    Goals:  Short Term Goals: (in 2 weeks)  1) Patient will be independent in HEP  2) Decrease pain in R elbow/wrist/hand to no more than 3-4/10 worst in ADL/IADL's  3) Increase AROM in R wrist ext by 3-5 degrees for improved functioning in ADL/IADL's  4) Increase B  strength by 3-5 psi for increased functional use  5) Decrease edema in R elbow/wrist by .2 cm      Long Term Goals: (in 6 weeks)  1) Decrease pain in RUE to no more than 1-2/10 worst in ADL/IADL's  2) Increase AROM of B elbow/wrist/hand to WFL for increased functioning in ADL/IADL's  3) Increase strength in B  & pinch by 25% of initial measures for improved functioning in ADL/IADL's  4) Increased functioning in ADL/IADL's, as evidenced by a FOTO impairment rating of no more than 25%  5) Decrease edema in R elbow/wrist to trace or none      Plan     Certification Period/Plan of care expiration: 4/29/2020 to 06/09/20.     Outpatient Occupational Therapy 2 times weekly for 6 weeks to include the following interventions: Paraffin, Manual therapy/joint mobilizations, Modalities for pain management, US 3 mhz, Therapeutic exercises/activities., Strengthening, Orthotic Fabrication/Fit/Training, Edema Control, Scar Management, Electrical Modalities and Joint Protection. Recommend to resume OT to LUE strengthening program while patient is receiving post op aftercare for her RUE    Updates/Grading for next session: progress strengthening, as tolerated      Joseph Carlin, OT

## 2020-05-28 ENCOUNTER — CLINICAL SUPPORT (OUTPATIENT)
Dept: REHABILITATION | Facility: HOSPITAL | Age: 60
End: 2020-05-28
Payer: COMMERCIAL

## 2020-05-28 DIAGNOSIS — M25.531 PAIN IN RIGHT WRIST: ICD-10-CM

## 2020-05-28 DIAGNOSIS — M25.521 PAIN IN RIGHT ELBOW: ICD-10-CM

## 2020-05-28 DIAGNOSIS — M62.81 MUSCLE WEAKNESS: ICD-10-CM

## 2020-05-28 DIAGNOSIS — M25.632 STIFFNESS OF LEFT WRIST JOINT: ICD-10-CM

## 2020-05-28 DIAGNOSIS — G60.9 CHRONIC IDIOPATHIC AXONAL POLYNEUROPATHY: ICD-10-CM

## 2020-05-28 DIAGNOSIS — M79.602 LEFT ARM PAIN: ICD-10-CM

## 2020-05-28 PROCEDURE — 97018 PARAFFIN BATH THERAPY: CPT | Mod: PO

## 2020-05-28 PROCEDURE — 97140 MANUAL THERAPY 1/> REGIONS: CPT | Mod: PO

## 2020-05-28 PROCEDURE — 97035 APP MDLTY 1+ULTRASOUND EA 15: CPT | Mod: PO

## 2020-05-28 PROCEDURE — 97110 THERAPEUTIC EXERCISES: CPT | Mod: PO

## 2020-05-28 NOTE — PROGRESS NOTES
Occupational Therapy Treatment Note     Date: 5/28/2020  Name: Sincere Riggins  Clinic Number: [id number]    Therapy Diagnosis:   Encounter Diagnoses   Name Primary?    Pain in right elbow     Pain in right wrist     Muscle weakness     Left arm pain     Stiffness of left wrist joint     Chronic idiopathic axonal polyneuropathy      Physician: Vadim Harp Jr*    Physician Orders: Evaluate & treat  Medical Diagnosis: L CTR & CuTR and R CTR & CuTR  Surgical Procedure and Date: L CTR & CuTR, 06/07/2019; R CTR & CuTR, 03/02/20  Evaluation Date: 04/28/20  Insurance Authorization Period Expiration: 12/31/2020  Plan of Care Certification Period: 04/28/20 to 06/09/20  Date of Return to MD: TBD      Visit # / Visits authorized: 8 / 20  Time In: 2:30 pm  Time Out: 3:15 pm  Total Billable Time: 45 minutes (1US, P, 1MT, 1TE)    Precautions:  Standard      Subjective     Pt reports: the numbness & tingling in R hand/SF is about the same since last treatment   she was compliant with home exercise program.    Response to previous treatment: increased R elbow/wrist/hand activity tolerance and strength  Functional change: patient able to use R hand in light ADL/IADL's without pain    Pain:  0-1/10  Location: right wrist/hand    Objective     Sincere eceived the following direct contact modalities after being cleared for contraindications for 10 minutes:   -3.3 MHz, .8 W/cm2, continuous, to R wrist/hand, to decrease pain & edema, increase circulation and tissue extensibility    Sincere received the following supervised modalities after being cleared for contraindications, simultaneously performed with Ultrasound:  -Paraffin w/ MHP to B wrist/hand, pre-tx to decrease pain & increase tissue extensibility    Sincere received the following manual therapy techniques for 15 minutes:   -Edema mgmt w/ lymphatic drainage technique;  Deep STM, including MFR, CFM & scar mgmt to R elbow/forearm/wrist/hand, using hand techniques and IASTM  tools to increase blood flow/circulation, improve soft tissue pliability and decrease pain.     Sincere received therapeutic exercises for 20 minutes including:  Forearm stretches-2 ways 3 reps, 10-15 sec hiold    Radial Nerve Glides 3 trials   Dexterciser 3 min, R hand only   Isospheres 3 min, R hand only        Smiles & frowns 2/10 reps, Green T-bar, B hands   Wrist Revs-2 ways 10 reps each way, green T-bar   PHG 2/10 reps, setting 3 B hands   Elbow 3 ways-full arc motion 10  reps each, 3# wt       Home Exercises and Education Provided     Education provided:   - Updated HEP to include forearm stretches and radial nerve glides/stretches  - Progress towards goals     Written Home Exercises Provided: Patient instructed to cont prior HEP.  Exercises were reviewed and Sincere was able to demonstrate them prior to the end of the session.  Sincere demonstrated good  understanding of the HEP provided.     See EMR under Patient Instructions for exercises provided 04/29/20.        Assessment     Pain level continues to be minimal to none in her RUE.  She continues to have numbness in her R hand/SF, which could possibly be radial or ulnar nerve distribution. Recommend to continue UE Nerve Glides & stretches in HEP.  Scar tissue density is minimal without adhesions at both surgical sites in R elbow and wrist.  Patient was able to perform all therapeutic exercises today without difficulty, but did have some fatigue by the end of the treatment.       Sincere is progressing well towards her goals and there are no updates to goals at this time. Pt prognosis is Good.     Pt will continue to benefit from skilled outpatient occupational therapy to address the deficits listed in the problem list on initial evaluation provide pt/family education and to maximize pt's level of independence in the home and community environment.     Anticipated barriers to occupational therapy:  neuropathy    Pt's spiritual, cultural and educational needs  considered and pt agreeable to plan of care and goals.    Goals:  Short Term Goals: (in 2 weeks)  1) Patient will be independent in HEP  2) Decrease pain in R elbow/wrist/hand to no more than 3-4/10 worst in ADL/IADL's  3) Increase AROM in R wrist ext by 3-5 degrees for improved functioning in ADL/IADL's  4) Increase B  strength by 3-5 psi for increased functional use  5) Decrease edema in R elbow/wrist by .2 cm      Long Term Goals: (in 6 weeks)  1) Decrease pain in RUE to no more than 1-2/10 worst in ADL/IADL's  2) Increase AROM of B elbow/wrist/hand to WFL for increased functioning in ADL/IADL's  3) Increase strength in B  & pinch by 25% of initial measures for improved functioning in ADL/IADL's  4) Increased functioning in ADL/IADL's, as evidenced by a FOTO impairment rating of no more than 25%  5) Decrease edema in R elbow/wrist to trace or none      Plan     Certification Period/Plan of care expiration: 4/29/2020 to 06/09/20.      Outpatient Occupational Therapy 2 times weekly for 6 weeks to include the following interventions: Paraffin, Manual therapy/joint mobilizations, Modalities for pain management, US 3 mhz, Therapeutic exercises/activities., Strengthening, Orthotic Fabrication/Fit/Training, Edema Control, Scar Management, Electrical Modalities and Joint Protection. Recommend to resume OT to LUE strengthening program while patient is receiving post op aftercare for her RUE    Updates/Grading for next session: progress strengthening, as tolerated      Joseph Carlin, OT

## 2020-06-02 ENCOUNTER — CLINICAL SUPPORT (OUTPATIENT)
Dept: REHABILITATION | Facility: HOSPITAL | Age: 60
End: 2020-06-02
Payer: COMMERCIAL

## 2020-06-02 DIAGNOSIS — M25.531 PAIN IN RIGHT WRIST: ICD-10-CM

## 2020-06-02 DIAGNOSIS — G60.9 CHRONIC IDIOPATHIC AXONAL POLYNEUROPATHY: ICD-10-CM

## 2020-06-02 DIAGNOSIS — M79.602 LEFT ARM PAIN: ICD-10-CM

## 2020-06-02 DIAGNOSIS — M62.81 MUSCLE WEAKNESS: ICD-10-CM

## 2020-06-02 DIAGNOSIS — M25.521 PAIN IN RIGHT ELBOW: ICD-10-CM

## 2020-06-02 DIAGNOSIS — M25.632 STIFFNESS OF LEFT WRIST JOINT: ICD-10-CM

## 2020-06-02 PROCEDURE — 97035 APP MDLTY 1+ULTRASOUND EA 15: CPT | Mod: PO

## 2020-06-02 PROCEDURE — 97110 THERAPEUTIC EXERCISES: CPT | Mod: PO

## 2020-06-02 PROCEDURE — 97018 PARAFFIN BATH THERAPY: CPT | Mod: PO,59

## 2020-06-02 PROCEDURE — 97140 MANUAL THERAPY 1/> REGIONS: CPT | Mod: PO

## 2020-06-02 NOTE — PROGRESS NOTES
"  Occupational Therapy Treatment Note     Date: 6/2/2020  Name: Sincere Riggins  Clinic Number: [id number]    Therapy Diagnosis:   Encounter Diagnoses   Name Primary?    Pain in right elbow     Pain in right wrist     Muscle weakness     Left arm pain     Stiffness of left wrist joint     Chronic idiopathic axonal polyneuropathy      Physician: Vadim Harp Jr*    Physician Orders: Evaluate & treat  Medical Diagnosis: L CTR & CuTR and R CTR & CuTR  Surgical Procedure and Date: L CTR & CuTR, 06/07/2019; R CTR & CuTR, 03/02/20  Evaluation Date: 04/28/20  Insurance Authorization Period Expiration: 12/31/2020  Plan of Care Certification Period: 04/28/20 to 06/09/20  Date of Return to MD: ARNULFOD      Visit # / Visits authorized: 9 / 20 Re-assess and Update Plan of Care next visit    Time In: 2:40 pm  Time Out: 3:15 pm  Total Billable Time: 35 minutes (1US, P, 1MT, 1TE)    Precautions:  Standard      Subjective     Pt reports:"I'm back to working full-time now, so I've been using both of my hands more. That's probably why I have more pain"  she was compliant with home exercise program.    Response to previous treatment: increased R elbow/wrist/hand activity tolerance and strength  Functional change: patient able to use R hand in light ADL/IADL's without pain    Pain:  3/10  Location: right wrist/hand    Objective     Sincere eceived the following direct contact modalities after being cleared for contraindications for 10 minutes:   -3.3 MHz, .8 W/cm2, continuous, to R wrist/hand, to decrease pain & edema, increase circulation and tissue extensibility    Sincere received the following supervised modalities after being cleared for contraindications, simultaneously performed with Ultrasound:  -Paraffin w/ MHP to B wrist/hand, pre-tx to decrease pain & increase tissue extensibility    Sincere received the following manual therapy techniques for 10 minutes:   -Edema mgmt w/ lymphatic drainage technique;  Deep STM, including MFR, " CFM & scar mgmt to R wrist/hand, using hand techniques and IASTM tools to increase blood flow/circulation, improve soft tissue pliability and decrease pain.     Sincere received therapeutic exercises for 15 minutes including:  Forearm stretches-2 ways 2 reps, 10-15 sec hiold    Radial & Ulnar Nerve Glides 3 trials each   Dexterciser 3 min, R hand only   Isospheres 2 min, R hand only        Smiles & frowns 2/10 reps, Green T-bar, B hands   Wrist Revs-2 ways 10 reps each way, green T-bar   PHG 2/10 reps, setting 3 B hands       Home Exercises and Education Provided     Education provided:   - Reviewed HEP for forearm stretches and radial/ulnar nerve glides/stretches  - Progress towards goals     Written Home Exercises Provided: Patient instructed to cont prior HEP.  Exercises were reviewed and Sincere was able to demonstrate them prior to the end of the session.  Sincere demonstrated good  understanding of the HEP provided.     See EMR under Patient Instructions for exercises provided 05/26/20.      Assessment     Pain level fluctuates depending on activity level and use of hands in resistive ADL/IADL's.  Scar tissue is moderate to minimally dense in R wrist/hand.  Patient continues to have numbness/tingling in her R hand/SF, but appears to be slowly improving over the last few days.  Recommend to continue UE Nerve Glides & stretches in HEP.  Scar tissue density is minimal without adhesions at both surgical sites in R elbow and wrist.  Patient was able to perform all therapeutic exercises today without difficulty, but did have some fatigue by the end of the treatment.       Sincere is progressing well towards her goals and there are no updates to goals at this time. Pt prognosis is Good.     Pt will continue to benefit from skilled outpatient occupational therapy to address the deficits listed in the problem list on initial evaluation provide pt/family education and to maximize pt's level of independence in the home and community  environment.     Anticipated barriers to occupational therapy:  neuropathy    Pt's spiritual, cultural and educational needs considered and pt agreeable to plan of care and goals.    Goals:  Short Term Goals: (in 2 weeks)  1) Patient will be independent in HEP  2) Decrease pain in R elbow/wrist/hand to no more than 3-4/10 worst in ADL/IADL's  3) Increase AROM in R wrist ext by 3-5 degrees for improved functioning in ADL/IADL's  4) Increase B  strength by 3-5 psi for increased functional use  5) Decrease edema in R elbow/wrist by .2 cm      Long Term Goals: (in 6 weeks)  1) Decrease pain in RUE to no more than 1-2/10 worst in ADL/IADL's  2) Increase AROM of B elbow/wrist/hand to WFL for increased functioning in ADL/IADL's  3) Increase strength in B  & pinch by 25% of initial measures for improved functioning in ADL/IADL's  4) Increased functioning in ADL/IADL's, as evidenced by a FOTO impairment rating of no more than 25%  5) Decrease edema in R elbow/wrist to trace or none      Plan     Certification Period/Plan of care expiration: 4/29/2020 to 06/09/20.      Outpatient Occupational Therapy 2 times weekly for 6 weeks to include the following interventions: Paraffin, Manual therapy/joint mobilizations, Modalities for pain management, US 3 mhz, Therapeutic exercises/activities., Strengthening, Orthotic Fabrication/Fit/Training, Edema Control, Scar Management, Electrical Modalities and Joint Protection. Recommend to resume OT to LUE strengthening program while patient is receiving post op aftercare for her RUE    Updates/Grading for next session: progress strengthening, as tolerated      Joseph Carlin, OT

## 2020-06-04 ENCOUNTER — CLINICAL SUPPORT (OUTPATIENT)
Dept: REHABILITATION | Facility: HOSPITAL | Age: 60
End: 2020-06-04
Payer: COMMERCIAL

## 2020-06-04 DIAGNOSIS — M25.531 PAIN IN RIGHT WRIST: ICD-10-CM

## 2020-06-04 DIAGNOSIS — M25.521 PAIN IN RIGHT ELBOW: ICD-10-CM

## 2020-06-04 DIAGNOSIS — M62.81 MUSCLE WEAKNESS: ICD-10-CM

## 2020-06-04 PROCEDURE — 97035 APP MDLTY 1+ULTRASOUND EA 15: CPT | Mod: PO

## 2020-06-04 PROCEDURE — 97018 PARAFFIN BATH THERAPY: CPT | Mod: PO

## 2020-06-04 PROCEDURE — 97110 THERAPEUTIC EXERCISES: CPT | Mod: PO

## 2020-06-04 NOTE — PROGRESS NOTES
"  Occupational Therapy Treatment Note     Date: 6/4/2020  Name: Sincere Riggins  Clinic Number: [id number]    Therapy Diagnosis:   Encounter Diagnoses   Name Primary?    Pain in right elbow     Pain in right wrist     Muscle weakness      Physician: Vadim Harp Jr*    Physician Orders: Evaluate & treat  Medical Diagnosis: L CTR & CuTR and R CTR & CuTR  Surgical Procedure and Date: L CTR & CuTR, 06/07/2019; R CTR & CuTR, 03/02/20  Evaluation Date: 04/28/20  Insurance Authorization Period Expiration: 12/31/2020  Plan of Care Certification Period: 04/28/20 to 06/09/20  Date of Return to MD: TBD      Visit # / Visits authorized: 10 / 20     Time In: 2:35 pm  Time Out: 3:15 pm  Total Billable Time: 40 minutes (1US, P, 1TE)    Precautions:  Standard      Subjective     Pt reports: "the pain is mostly at the end of the day and at night. I think it's because I am using my hands all day". She also reports having difficulty opening containers and grasping objects in ADL/IADL's and requires assist for these tasks  she was compliant with home exercise program.    Response to previous treatment: increased R elbow/wrist/hand activity tolerance and strength  Functional change: patient able to use R hand in light ADL/IADL's without pain    Pain:  1-2/10  Location: right wrist/hand    Objective     Sincere eceived the following direct contact modalities after being cleared for contraindications for 10 minutes:   -3.3 MHz, .8 W/cm2, continuous, to R wrist/hand, to decrease pain & edema, increase circulation and tissue extensibility    Sincere received the following supervised modalities after being cleared for contraindications, simultaneously performed with Ultrasound:  -Paraffin w/ MHP to B wrist/hand, pre-tx to decrease pain & increase tissue extensibility    Sincere received therapeutic exercises for 15 minutes including:  Objective measures taken today, see Updated Plan of CAre for details  Therapeutic exercise, as " follows:  Tputty Red   -Molding 2 min, B hands   -Grasp Manipulation 3 reps, 5 sec hold, B hands   -Log rolls with tripod pinch 2 trials, B hands       Home Exercises and Education Provided     Education provided:   - Reviewed HEP   - Progress towards goals     Written Home Exercises Provided: Patient instructed to cont prior HEP.  Exercises were reviewed and Sincere was able to demonstrate them prior to the end of the session.  Sincere demonstrated good  understanding of the HEP provided.     See EMR under Patient Instructions for exercises provided 05/26/20.      Assessment     See Updated Plan of Care for details on patient's progress towards goals.    Sincere is progressing well towards her goals and there are no updates to goals at this time. Pt prognosis is Good.     Pt will continue to benefit from skilled outpatient occupational therapy to address the deficits listed in the problem list on initial evaluation provide pt/family education and to maximize pt's level of independence in the home and community environment.     Anticipated barriers to occupational therapy:  neuropathy    Pt's spiritual, cultural and educational needs considered and pt agreeable to plan of care and goals.    Plan     See Updated Plan of Care for details and recommendations for treatment plan.        Joseph Carlin, OT

## 2020-06-04 NOTE — PLAN OF CARE
Outpatient Therapy Updated Plan of Care     Visit Date: 6/4/2020  Name: Sincere Riggins  Clinic Number: [id number]    Therapy Diagnosis:   Encounter Diagnoses   Name Primary?    Pain in right elbow     Pain in right wrist     Muscle weakness      Physician: Vadim Harp Jr*    Physician Orders: Evaluate & treat  Medical Diagnosis: L CTR & CuTR and R CTR & CuTR  Surgical Procedure and Date: L CTR & CuTR, 06/07/2019; R CTR & CuTR, 03/02/20  Evaluation Date: 04/28/20    Total Visits Received: 10  Cancelled Visits: 3  No Show Visits: 0    Current Certification Period:  04/29/20 to 06/09/20  Precautions:  Standard  Visits from Evaluation Date:  10  Functional Level Prior to Evaluation:  Independent    Subjective     Update: Patient feels condition is improving, but her biggest complaint is the residual weakness in both hands.    Pain:  Functional Pain Scale Rating 0-10:   1-2/10 on average  1/10 at best  3-4/10 at worst  Location: R wrist/hand  Description: Aching, Tingling, Numb, occasional Sharp,   Aggravating Factors: Night Time and with excess use of R hand, typing or writing  Easing Factors: OTC pain medication, ice, rest and elevation    Objective     Update: Sacr tissue is moderately to minimally dense at R wrist/hand and minimally dense at R elbow, without adhesions to underlying soft tissue.  Patient is noted to have continued significant intrinsic muscle atrophy in her L hand and minimal to moderate in her R hand.       Edema. Measured in centimeters.   04/29/20 04/29/30 06/4/20    Left Right Right   Elbow crease 23.0 24.0 24.0 (=)   2in. Below elbow 22.4 23.5 23.1 (-.4)   Wrist Crease 14.5 14.5 14.7 (+.2)   Mid palm 17.0 18.5 18.0 (-.5)   MCPs 18.2 18.5 18.7 (+.2)      04/29/20 04/29/20 06/4/20    Left Right Right   Elbow Ext/Flex 0/150 0/145 0/150 (+5)   Forearm Sup/pron WNL WNL WNL   Wrist E/F 65/70 60/70 65 (+5) / 70 (=)   Wrist RD/UD 24/35 20/35 20 (=) / 40 (+5)   Thumb R/P Abd 45/50 45/47 55  (+10) / 55 (+8)   Thumb MP flex 61 64 69 (+5)   Thumb IP flex 80 65 69 (+5)   Thumb [place] To SF DPC To SF DPC To SF DPC (=)       Hand ROM:  Full composite fist WNL, Price    Sensation: Patient continues to report numbness and tingling over the ulnar aspect of the R wrist/handSF, however, patient reports slight improvement over the past week.  Light touch and temp are intact in BUE's     Strength (Dyanmometer) and Pinch Strength (Pinch Gauge)  Measured in pounds and psi.    04/29/20 04/29/20 06/4/20 06/4/20    Left Right Left Right   Rung II 31.5 40 35 (+3.5) 43 (+3)   Key Pinch 4 6 4 (=) 6 (=)   3pt Pinch 2.5 4.5 3 (+.5) 6 (+1.5)   2pt Pinch 3 3 2 (-1) 2 (-1)       Assessment     Update: Patient's AROM has improved and is currently WNL in her RUE.  Her pain level is minimal which has decreased since last assessment, however, has not completely resolved.  Her biggest problem at this time is strength which affects her functioning in ADL/IADL's.  She would benefit from continued OT to address areas of deficit to maximize functioning to highest level.     Goals:   Short Term Goals: (in 2 weeks)  1) Patient will be independent in HEP-met  2) Decrease pain in R elbow/wrist/hand to no more than 3-4/10 worst in ADL/IADL's-met  3) Increase AROM in R wrist ext by 3-5 degrees for improved functioning in ADL/IADL's-met  4) Increase B  strength by 3-5 psi for increased functional use-met/ongoing  5) Decrease edema in R elbow/wrist by .2 cm-met    Long Term Goals: (in 6 weeks)  1) Decrease pain in RUE to no more than 1-2/10 worst in ADL/IADL's-ongoing  2) Increase AROM of B elbow/wrist/hand to WFL for increased functioning in ADL/IADL's-met  3) Increase strength in B  & pinch by 25% of initial measures for improved functioning in ADL/IADL'songoing  4) Increased functioning in ADL/IADL's, as evidenced by a FOTO impairment rating of no more than 25%-ongoing  5) Decrease edema in R elbow/wrist to trace or  none-met    Previous Short Term Goals Status:   5/5 STG's met  New Short Term Goals Status:   Progress to LTG's  Long Term Goal Status:   continue per initial plan of care.  Reasons for Recertification of Therapy:   Updated Plan of Care needed    Plan     Updated Certification Period: 6/4/2020 to 07/14/20    Recommended Treatment Plan: 2 times per week for 6 weeks: Manual Therapy, Moist Heat/ Ice, Neuromuscular Re-ed, Paraffin, Patient Education, Self Care, Therapeutic Activites, Therapeutic Exercise and Ultrasound     Other Recommendations: progress strengthening further    Joseph Carlin OT  6/4/2020      I CERTIFY THE NEED FOR THESE SERVICES FURNISHED UNDER THIS PLAN OF TREATMENT AND WHILE UNDER MY CARE    Physician's comments:        Physician's Signature: ___________________________________________________

## 2020-06-11 ENCOUNTER — CLINICAL SUPPORT (OUTPATIENT)
Dept: REHABILITATION | Facility: HOSPITAL | Age: 60
End: 2020-06-11
Payer: COMMERCIAL

## 2020-06-11 DIAGNOSIS — M25.521 PAIN IN RIGHT ELBOW: ICD-10-CM

## 2020-06-11 DIAGNOSIS — M62.81 MUSCLE WEAKNESS: ICD-10-CM

## 2020-06-11 DIAGNOSIS — M25.531 PAIN IN RIGHT WRIST: ICD-10-CM

## 2020-06-11 PROCEDURE — 97018 PARAFFIN BATH THERAPY: CPT | Mod: PO,59

## 2020-06-11 PROCEDURE — 97140 MANUAL THERAPY 1/> REGIONS: CPT | Mod: PO

## 2020-06-11 PROCEDURE — 97035 APP MDLTY 1+ULTRASOUND EA 15: CPT | Mod: PO

## 2020-06-11 PROCEDURE — 97110 THERAPEUTIC EXERCISES: CPT | Mod: PO

## 2020-06-11 NOTE — PROGRESS NOTES
"  Occupational Therapy Treatment Note     Date: 6/11/2020  Name: Sincere Riggins  Clinic Number: [id number]    Therapy Diagnosis:   Encounter Diagnoses   Name Primary?    Pain in right elbow     Pain in right wrist     Muscle weakness      Physician: Vadim Hrap Jr*    Physician Orders: Evaluate & treat  Medical Diagnosis: L CTR & CuTR and R CTR & CuTR  Surgical Procedure and Date: L CTR & CuTR, 06/07/2019; R CTR & CuTR, 03/02/20  Evaluation Date: 04/28/20  Insurance Authorization Period Expiration: 12/31/2020  Plan of Care Certification Period: 04/28/20 to 06/09/20  Date of Return to MD: TBD      Visit # / Visits authorized: 11 / 20     Time In: 2:35 pm  Time Out: 3:15 pm  Total Billable Time: 40 minutes (1US, P, 1MT, 1TE)    Precautions:  Standard      Subjective     Pt reports: "I'm not sure why I'm having more pain, but it's in the same spot as the pain I had before in my L hand after I had surgery on it".  she was compliant with home exercise program.    Response to previous treatment: increased R elbow/wrist/hand activity tolerance and strength  Functional change: patient able to use R hand in light ADL/IADL's without pain    Pain:  3-4/10  Location: right wrist/hand    Objective     Sincere eceived the following direct contact modalities after being cleared for contraindications for 10 minutes:   -3.3 MHz, .8 W/cm2, continuous, to R wrist/hand, to decrease pain & edema, increase circulation and tissue extensibility    Sincere received the following supervised modalities after being cleared for contraindications, simultaneously performed with Ultrasound:  -Paraffin w/ MHP to B wrist/hand, pre-tx to decrease pain & increase tissue extensibility    Sincere eceived the following manual therapy techniques for 15 minutes:   -Edema mgmt w/ lymphatic drainage technique;  Deep STM, including MFR, CFM & scar mgmt to R elbow/forearm/wrist/hand, using hand techniques and IASTM tools to increase blood flow/circulation, " improve soft tissue pliability and decrease pain.    Sincere received therapeutic exercises for 20 minutes including:  Dexterciser 3 min, R hand only   Isospheres 2 min, R hand only    Wrist 3 ways over wedge 10 reps, 2# wt   Smiles & frowns 2/10 reps, Green T-bar, B hands   Wrist Revs-2 ways 10 reps each way, green T-bar   PHG 2/10 reps, setting 3 B hands   Elbow 3 ways-full arc motion 10  reps each, 3# wt   Constant Force finger extension 2/10 reps, green       Home Exercises and Education Provided     Education provided:   - Reviewed HEP   - Progress towards goals     Written Home Exercises Provided: Patient instructed to cont prior HEP.  Exercises were reviewed and Sincere was able to demonstrate them prior to the end of the session.  Sincere demonstrated good  understanding of the HEP provided.     See EMR under Patient Instructions for exercises provided 05/26/20.      Assessment     Patient with a slight increase in pain in her R wrist/hand possibly due to nerve irritation in the ulnar nerve distribution.  Scar tissue is minimal to moderate in R wrist/hand at surgical site and immediately surrounding it.  Patient was able to complete all therapeutic exercises today without any increase in pain or difficulty.     Sincere is progressing well towards her goals and there are no updates to goals at this time. Pt prognosis is Good.     Pt will continue to benefit from skilled outpatient occupational therapy to address the deficits listed in the problem list on initial evaluation provide pt/family education and to maximize pt's level of independence in the home and community environment.     Anticipated barriers to occupational therapy:  neuropathy    Pt's spiritual, cultural and educational needs considered and pt agreeable to plan of care and goals.    Plan     Updated Certification Period: 6/4/2020 to 07/14/20     Recommended Treatment Plan: 2 times per week for 6 weeks: Manual Therapy, Moist Heat/ Ice, Neuromuscular Re-ed,  Paraffin, Patient Education, Self Care, Therapeutic Activites, Therapeutic Exercise and Ultrasound      Other Recommendations: progress strengthening further        Joseph Carlin, OT

## 2020-06-16 ENCOUNTER — CLINICAL SUPPORT (OUTPATIENT)
Dept: REHABILITATION | Facility: HOSPITAL | Age: 60
End: 2020-06-16
Payer: COMMERCIAL

## 2020-06-16 DIAGNOSIS — M25.521 PAIN IN RIGHT ELBOW: ICD-10-CM

## 2020-06-16 DIAGNOSIS — M79.602 LEFT ARM PAIN: ICD-10-CM

## 2020-06-16 DIAGNOSIS — M62.81 MUSCLE WEAKNESS: ICD-10-CM

## 2020-06-16 DIAGNOSIS — G60.9 CHRONIC IDIOPATHIC AXONAL POLYNEUROPATHY: ICD-10-CM

## 2020-06-16 DIAGNOSIS — M25.531 PAIN IN RIGHT WRIST: ICD-10-CM

## 2020-06-16 DIAGNOSIS — M25.632 STIFFNESS OF LEFT WRIST JOINT: ICD-10-CM

## 2020-06-16 PROCEDURE — 97140 MANUAL THERAPY 1/> REGIONS: CPT | Mod: PO

## 2020-06-16 PROCEDURE — 97035 APP MDLTY 1+ULTRASOUND EA 15: CPT | Mod: PO

## 2020-06-16 PROCEDURE — 97110 THERAPEUTIC EXERCISES: CPT | Mod: PO

## 2020-06-16 NOTE — PROGRESS NOTES
"  Occupational Therapy Treatment Note     Date: 6/16/2020  Name: Sincere Riggins  Clinic Number: [id number]    Therapy Diagnosis:   Encounter Diagnoses   Name Primary?    Pain in right elbow     Pain in right wrist     Muscle weakness     Left arm pain     Stiffness of left wrist joint     Chronic idiopathic axonal polyneuropathy      Physician: Vadim Harp Jr*    Physician Orders: Evaluate & treat  Medical Diagnosis: L CTR & CuTR and R CTR & CuTR  Surgical Procedure and Date: L CTR & CuTR, 06/07/2019; R CTR & CuTR, 03/02/20  Evaluation Date: 04/28/20  Insurance Authorization Period Expiration: 12/31/2020  Plan of Care Certification Period: 04/28/20 to 06/09/20  Date of Return to MD: TBD      Visit # / Visits authorized: 12 / 20     Time In: 2:35 pm    Time Out: 3:15 pm  Total Billable Time: 40 minutes (1US, P, 1MT, 1TE)    Precautions:  Standard      Subjective     Pt reports: "I think the nerve on the side of my hand is coming back salma I'm feeling some tingling that I didn't feel before".  she was compliant with home exercise program.    Response to previous treatment: increased R elbow/wrist/hand activity tolerance and strength  Functional change: patient able to use R hand in light ADL/IADL's without pain    Pain:  3/10  Location: right wrist/hand    Objective     Sincere eceived the following direct contact modalities after being cleared for contraindications for 10 minutes:   -3.3 MHz, .8 W/cm2, continuous, to R wrist/hand, to decrease pain & edema, increase circulation and tissue extensibility    Sincere received the following supervised modalities after being cleared for contraindications, simultaneously performed with Ultrasound:  -Paraffin w/ MHP to B wrist/hand, pre-tx to decrease pain & increase tissue extensibility    Sincere eceived the following manual therapy techniques for 10 minutes:   -Edema mgmt w/ lymphatic drainage technique;  Deep STM, including MFR, CFM & scar mgmt to R " elbow/forearm/wrist/hand, using hand techniques and IASTM tools to increase blood flow/circulation, improve soft tissue pliability and decrease pain.    Sincere received therapeutic exercises for 25 minutes including:  Dexterciser 3 min, R hand only   Isospheres 2 min, R hand only    Wrist 3 ways over wedge R=2/10, L=10 reps, 2# wt   Smiles & frowns 2/10 reps, Green T-bar, B hands   Wrist Revs-2 ways 10 reps each way, green T-bar   PHG 2/10 reps, setting 3 B hands   Elbow 3 ways-full arc motion R=15, L=10 reps each, 3# wt       T-putty Red   Grasp 3 reps, 5 sec hold       Home Exercises and Education Provided     Education provided:   - Reviewed HEP; discussed other options for scar mgmt in HEP for R hand   - Progress towards goals     Written Home Exercises Provided: Patient instructed to cont prior HEP.  Exercises were reviewed and Sincere was able to demonstrate them prior to the end of the session.  Sincere demonstrated good  understanding of the HEP provided.     See EMR under Patient Instructions for exercises provided 05/26/20.      Assessment     Patient's pain level slightly decreased since last treatment. Scar tissue is moderately dense without adhesions to underlying soft tissue.  She is noted to have greater density of scar tissue over the ulnar pillar of R wrist, but his improved by the end of the treatment today.  Patient was able to complete all therapeutic exercises today without any increase in pain or difficulty.     Sincere is progressing well towards her goals and there are no updates to goals at this time. Pt prognosis is Good.     Pt will continue to benefit from skilled outpatient occupational therapy to address the deficits listed in the problem list on initial evaluation provide pt/family education and to maximize pt's level of independence in the home and community environment.     Anticipated barriers to occupational therapy:  neuropathy    Pt's spiritual, cultural and educational needs considered and  pt agreeable to plan of care and goals.    Plan     Updated Certification Period: 6/4/2020 to 07/14/20     Recommended Treatment Plan: 2 times per week for 6 weeks: Manual Therapy, Moist Heat/ Ice, Neuromuscular Re-ed, Paraffin, Patient Education, Self Care, Therapeutic Activites, Therapeutic Exercise and Ultrasound      Other Recommendations: progress strengthening further        Joseph Carlin, OT

## 2020-06-18 ENCOUNTER — CLINICAL SUPPORT (OUTPATIENT)
Dept: REHABILITATION | Facility: HOSPITAL | Age: 60
End: 2020-06-18
Payer: COMMERCIAL

## 2020-06-18 DIAGNOSIS — M25.531 PAIN IN RIGHT WRIST: ICD-10-CM

## 2020-06-18 DIAGNOSIS — G60.9 CHRONIC IDIOPATHIC AXONAL POLYNEUROPATHY: ICD-10-CM

## 2020-06-18 DIAGNOSIS — M79.602 LEFT ARM PAIN: ICD-10-CM

## 2020-06-18 DIAGNOSIS — M25.632 STIFFNESS OF LEFT WRIST JOINT: ICD-10-CM

## 2020-06-18 DIAGNOSIS — M62.81 MUSCLE WEAKNESS: ICD-10-CM

## 2020-06-18 DIAGNOSIS — M25.521 PAIN IN RIGHT ELBOW: ICD-10-CM

## 2020-06-18 PROCEDURE — 97140 MANUAL THERAPY 1/> REGIONS: CPT | Mod: PO

## 2020-06-18 PROCEDURE — 97110 THERAPEUTIC EXERCISES: CPT | Mod: PO

## 2020-06-18 PROCEDURE — 97018 PARAFFIN BATH THERAPY: CPT | Mod: PO,59

## 2020-06-18 NOTE — PROGRESS NOTES
"  Occupational Therapy Treatment Note     Date: 6/18/2020  Name: Sincere Riggins  Clinic Number: [id number]    Therapy Diagnosis:   Encounter Diagnoses   Name Primary?    Pain in right elbow     Pain in right wrist     Muscle weakness     Left arm pain     Stiffness of left wrist joint     Chronic idiopathic axonal polyneuropathy      Physician: Vadim Harp Jr*    Physician Orders: Evaluate & treat  Medical Diagnosis: L CTR & CuTR and R CTR & CuTR  Surgical Procedure and Date: L CTR & CuTR, 06/07/2019; R CTR & CuTR, 03/02/20  Evaluation Date: 04/28/20  Insurance Authorization Period Expiration: 12/31/2020  Plan of Care Certification Period: 04/28/20 to 06/09/20  Date of Return to MD: TBD      Visit # / Visits authorized: 13 / 20     Time In: 2:40 pm    Time Out: 3:15 pm  Total Billable Time: 35 minutes (P, 1MT, 1TE)    Precautions:  Standard      Subjective     Pt reports: "I saw that my little finger is drawing in a little".  she was compliant with home exercise program.    Response to previous treatment: increased R elbow/wrist/hand activity tolerance and strength  Functional change: patient able to use R hand in light ADL/IADL's without pain    Pain:  3/10  Location: right wrist/hand    Objective     Sincere Post received the following supervised modalities after being cleared for contraindications for 10 minutes, as follows:  -Paraffin w/ MHP to B wrist/hand, pre-tx to decrease pain & increase tissue extensibility    Sincere eceived the following manual therapy techniques for 10 minutes:   -Edema mgmt w/ lymphatic drainage technique;  Deep STM, including MFR, CFM & scar mgmt to R elbow/forearm/wrist/hand, using hand techniques and IASTM tools to increase blood flow/circulation, improve soft tissue pliability and decrease pain.    Sincere received therapeutic exercises for 15 minutes including:  Wrist 3 ways over wedge 10 reps, 3# wt   Smiles & frowns 2/10 reps, Green T-bar, B hands   Wrist Revs-2 ways 10 " "reps each way, green T-bar   PHG 2/10 reps, setting 3 B hands   Elbow 3 ways-full arc motion R=15, L=10 reps each, 3# wt           Home Exercises and Education Provided     Education provided:   - Added LMB PIP Extension Splint wear for night time on R SF.  Reviewed HEP; discussed other options for scar mgmt in HEP for R hand   - Progress towards goals     Written Home Exercises Provided: Patient instructed to cont prior HEP.  Exercises were reviewed and Sincere was able to demonstrate them prior to the end of the session.  Sincere demonstrated good  understanding of the HEP provided.     See EMR under Patient Instructions for exercises provided 05/26/20.      Assessment     Patient is noted to have a flexion posturing of R SF at rest ("clawing") and a -14 degree PIP extension lag today.  It was recommended that patient start wearing LMB PIP extension splint at night to decrease potential of contracture.  Scar tissue remains moderately dense without adhesions to underlying soft tissue in R wrist/hand.  Patient was able to complete all therapeutic exercises today without any increase in pain or difficulty.     Sincere is progressing well towards her goals and there are no updates to goals at this time. Pt prognosis is Good.     Pt will continue to benefit from skilled outpatient occupational therapy to address the deficits listed in the problem list on initial evaluation provide pt/family education and to maximize pt's level of independence in the home and community environment.     Anticipated barriers to occupational therapy:  neuropathy    Pt's spiritual, cultural and educational needs considered and pt agreeable to plan of care and goals.    Plan     Updated Certification Period: 6/4/2020 to 07/14/20     Recommended Treatment Plan: 2 times per week for 6 weeks: Manual Therapy, Moist Heat/ Ice, Neuromuscular Re-ed, Paraffin, Patient Education, Self Care, Therapeutic Activites, Therapeutic Exercise and Ultrasound      Other " Recommendations: progress strengthening further        Joseph Carlin, OT

## 2020-06-23 ENCOUNTER — CLINICAL SUPPORT (OUTPATIENT)
Dept: REHABILITATION | Facility: HOSPITAL | Age: 60
End: 2020-06-23
Payer: COMMERCIAL

## 2020-06-23 DIAGNOSIS — M25.531 PAIN IN RIGHT WRIST: ICD-10-CM

## 2020-06-23 DIAGNOSIS — M25.521 PAIN IN RIGHT ELBOW: ICD-10-CM

## 2020-06-23 DIAGNOSIS — M62.81 MUSCLE WEAKNESS: ICD-10-CM

## 2020-06-23 PROCEDURE — 97018 PARAFFIN BATH THERAPY: CPT | Mod: PO

## 2020-06-23 PROCEDURE — 97140 MANUAL THERAPY 1/> REGIONS: CPT | Mod: PO

## 2020-06-23 PROCEDURE — 97035 APP MDLTY 1+ULTRASOUND EA 15: CPT | Mod: PO

## 2020-06-23 PROCEDURE — 97110 THERAPEUTIC EXERCISES: CPT | Mod: PO

## 2020-06-23 NOTE — PROGRESS NOTES
"  Occupational Therapy Treatment Note     Date: 6/23/2020  Name: Sincere Riggins  Clinic Number: [id number]    Therapy Diagnosis:   Encounter Diagnoses   Name Primary?    Pain in right elbow     Pain in right wrist     Muscle weakness      Physician: Vadim Harp Jr*    Physician Orders: Evaluate & treat  Medical Diagnosis: L CTR & CuTR and R CTR & CuTR  Surgical Procedure and Date: L CTR & CuTR, 06/07/2019; R CTR & CuTR, 03/02/20  Evaluation Date: 04/28/20  Insurance Authorization Period Expiration: 12/31/2020  Plan of Care Certification Period: 04/28/20 to 06/09/20  Date of Return to MD: TBD      Visit # / Visits authorized: 14 / 20     Time In: 2:40 pm    Time Out: 3:20 pm  Total Billable Time: 40 minutes (1US, P, 1MT, 1TE)    Precautions:  Standard      Subjective     Pt reports: "The pain isn't in my hand anymore. It's just in the pinky finger now. I know it's getting better". She continues to complain of difficulty with FMC tasks.    she was compliant with home exercise program, including R SF LMB PIP Extension slplint  Response to previous treatment: able to lift moderate weighted objects and GMC improved  Functional change: patient able to use R hand in light ADL/IADL's without pain    Pain:  1-2/10  Location: right wrist/hand    Objective     Tonireceived the following direct contact modalities after being cleared for contraindications for 10 minutes:   -3.3 MHz, .8 W/cm2, continuous, to R wrist/hand, to decrease pain & edema, increase circulation and tissue extensibility  Sincere received the following supervised modalities after being cleared for contraindications as follows: (simultaneously performed with Ultrasound)  -Paraffin w/ MHP to L wrist/hand, pre-tx to decrease pain & increase tissue extensibility    Sincere eceived the following manual therapy techniques for 10 minutes:   -Edema mgmt w/ lymphatic drainage technique;  Deep STM, including MFR, CFM & scar mgmt to R elbow/forearm/wrist/hand, " using hand techniques and IASTM tools to increase blood flow/circulation, improve soft tissue pliability and decrease pain.    Sincere received therapeutic exercises for 15 minutes including:  Wrist 3 ways over wedge 2/10 reps, 3# wt   Smiles & frowns 2/10 reps, Green T-bar, B hands   Wrist Revs-2 ways 10 reps each way, green T-bar   PHG 2/10 reps, setting 3 B hands   Elbow 3 ways-full arc motion R=2/10, L=15 reps each, 3# wt       T-putty Yellow/green   -bottle cap turning-CW/CCW 5 times each way   -prehension movement patterns 3 min with dowel   -Simulated feeding task for FMC Using fork & knife to cut putty       Home Exercises and Education Provided     Education provided:   - Added Reversed blocking PIP Extension ROM exercises to HEP; discussed adaptive equipment for prehension stabilization; also discussed further T-putty exercises for intrinsic strengthening.    - Progress towards goals     Written Home Exercises Provided: Patient instructed to cont prior HEP.  Exercises were reviewed and Sincere was able to demonstrate them prior to the end of the session.  Sincere demonstrated good  understanding of the HEP provided.     See EMR under Patient Instructions for exercises provided 05/26/20.      Assessment     Patient continues with mild flexion posturing/clawing of R SF, however, has slightly improved since last treatment possibly due to patient applying the LMB PIP extension splint.  Patient was noted to have great difficulty with tripod stabilization in B hands, but was worse on her L side.  Recommendations were made for HEP and tasks to improve FMC and intrinsic strength.  Scar tissue is slowly improving with each visit in her R wrist/hand compared to last few treatments.       Sincere is progressing well towards her goals and there are no updates to goals at this time. Pt prognosis is Good.     Pt will continue to benefit from skilled outpatient occupational therapy to address the deficits listed in the problem list  on initial evaluation provide pt/family education and to maximize pt's level of independence in the home and community environment.     Anticipated barriers to occupational therapy:  neuropathy    Pt's spiritual, cultural and educational needs considered and pt agreeable to plan of care and goals.    Plan     Updated Certification Period: 6/4/2020 to 07/14/20     Recommended Treatment Plan: 2 times per week for 6 weeks: Manual Therapy, Moist Heat/ Ice, Neuromuscular Re-ed, Paraffin, Patient Education, Self Care, Therapeutic Activites, Therapeutic Exercise and Ultrasound      Other Recommendations: progress strengthening further        Joseph Carlin, OT

## 2020-06-25 ENCOUNTER — CLINICAL SUPPORT (OUTPATIENT)
Dept: REHABILITATION | Facility: HOSPITAL | Age: 60
End: 2020-06-25
Payer: COMMERCIAL

## 2020-06-25 DIAGNOSIS — M62.81 MUSCLE WEAKNESS: ICD-10-CM

## 2020-06-25 DIAGNOSIS — M25.531 PAIN IN RIGHT WRIST: ICD-10-CM

## 2020-06-25 DIAGNOSIS — M25.521 PAIN IN RIGHT ELBOW: ICD-10-CM

## 2020-06-25 PROCEDURE — 97110 THERAPEUTIC EXERCISES: CPT | Mod: PO

## 2020-06-25 PROCEDURE — 97035 APP MDLTY 1+ULTRASOUND EA 15: CPT | Mod: PO

## 2020-06-25 PROCEDURE — 97140 MANUAL THERAPY 1/> REGIONS: CPT | Mod: PO

## 2020-06-25 PROCEDURE — 97018 PARAFFIN BATH THERAPY: CPT | Mod: PO,59

## 2020-06-25 NOTE — PROGRESS NOTES
Occupational Therapy Treatment Note     Date: 6/25/2020  Name: Sincere Riggins  Clinic Number: [id number]    Therapy Diagnosis:   Encounter Diagnoses   Name Primary?    Pain in right elbow     Pain in right wrist     Muscle weakness      Physician: Vadim Harp Jr*    Physician Orders: Evaluate & treat  Medical Diagnosis: L CTR & CuTR and R CTR & CuTR  Surgical Procedure and Date: L CTR & CuTR, 06/07/2019; R CTR & CuTR, 03/02/20  Evaluation Date: 04/28/20  Insurance Authorization Period Expiration: 12/31/2020  Plan of Care Certification Period: 04/28/20 to 06/09/20  Date of Return to MD: TBD      Visit # / Visits authorized: 15 / 20     Time In: 2:35 pm    Time Out: 3:15 pm  Total Billable Time: 40 minutes (1US, P, 1MT, 1TE)    Precautions:  Standard      Subjective     Pt reports: Patient feels the fine motor exercises from last treatment was helpful and has incorporated it into her HEP    she was compliant with home exercise program, including R SF LMB PIP Extension slplint  Response to previous treatment: able to lift moderate weighted objects and GMC improved  Functional change: patient able to use R hand in light ADL/IADL's without pain    Pain:  1-2/10  Location: right wrist/hand    Objective     Tonireceived the following direct contact modalities after being cleared for contraindications for 10 minutes:   -3.3 MHz, .8 W/cm2, continuous, to R wrist/hand, to decrease pain & edema, increase circulation and tissue extensibility  Sincere received the following supervised modalities after being cleared for contraindications as follows: (simultaneously performed with Ultrasound)  -Paraffin w/ MHP to L wrist/hand, pre-tx to decrease pain & increase tissue extensibility    Sincere eceived the following manual therapy techniques for 10 minutes:   -Edema mgmt w/ lymphatic drainage technique;  Deep STM, including MFR, CFM & scar mgmt to R elbow/forearm/wrist/hand, using hand techniques and IASTM tools to increase  blood flow/circulation, improve soft tissue pliability and decrease pain.    Sincere received therapeutic exercises for 15 minutes including:  Wrist 3 ways over wedge 2/10 reps, 3# wt   Smiles & frowns 2/10 reps, Green T-bar, B hands   Wrist Revs-2 ways 10 reps each way, green T-bar   PHG 2/10 reps, setting 3 B hands   Elbow 3 ways-full arc motion R=2/10, L=15 reps each, 3# wt       T-putty Yellow/green   -molding 2 min each   -grasp 3 reps R only   -log rolls with tripod pinch 1 log each   -bottle cap turning-CW/CCW 5 times each way, B hands       Home Exercises and Education Provided     Education provided:   - Reviewed HEP   - Progress towards goals     Written Home Exercises Provided: Patient instructed to cont prior HEP.  Exercises were reviewed and Sincere was able to demonstrate them prior to the end of the session.  Sincere demonstrated good  understanding of the HEP provided.     See EMR under Patient Instructions for exercises provided 05/26/20.      Assessment     Patient able to perform all therapeutic exercises listed above without any problems. She is making steady gains in strength and coordination of B elbow/forearm/wrist/hands, however, deficits remain affecting her ADL/IADL's.  Her biggest problems is weakness in BUEs due to prolonged nerve compression.  Patient has potential for further improvement with continued OT.         Sincere is progressing well towards her goals and there are no updates to goals at this time. Pt prognosis is Good.     Pt will continue to benefit from skilled outpatient occupational therapy to address the deficits listed in the problem list on initial evaluation provide pt/family education and to maximize pt's level of independence in the home and community environment.     Anticipated barriers to occupational therapy:  neuropathy    Pt's spiritual, cultural and educational needs considered and pt agreeable to plan of care and goals.    Plan     Updated Certification Period: 6/4/2020  to 07/14/20     Recommended Treatment Plan: 2 times per week for 6 weeks: Manual Therapy, Moist Heat/ Ice, Neuromuscular Re-ed, Paraffin, Patient Education, Self Care, Therapeutic Activites, Therapeutic Exercise and Ultrasound      Other Recommendations: progress strengthening further        Joseph Carlin, OT

## 2020-06-30 ENCOUNTER — CLINICAL SUPPORT (OUTPATIENT)
Dept: REHABILITATION | Facility: HOSPITAL | Age: 60
End: 2020-06-30
Payer: COMMERCIAL

## 2020-06-30 DIAGNOSIS — M62.81 MUSCLE WEAKNESS: ICD-10-CM

## 2020-06-30 DIAGNOSIS — M25.521 PAIN IN RIGHT ELBOW: ICD-10-CM

## 2020-06-30 DIAGNOSIS — M25.531 PAIN IN RIGHT WRIST: ICD-10-CM

## 2020-06-30 PROCEDURE — 97140 MANUAL THERAPY 1/> REGIONS: CPT | Mod: PO

## 2020-06-30 PROCEDURE — 97110 THERAPEUTIC EXERCISES: CPT | Mod: PO

## 2020-06-30 PROCEDURE — 97018 PARAFFIN BATH THERAPY: CPT | Mod: PO,59

## 2020-06-30 NOTE — PROGRESS NOTES
"  Occupational Therapy Treatment Note     Date: 6/30/2020  Name: Sincere Riggins  Clinic Number: [id number]    Therapy Diagnosis:   Encounter Diagnoses   Name Primary?    Pain in right elbow     Pain in right wrist     Muscle weakness      Physician: Vadim Harp Jr*    Physician Orders: Evaluate & treat  Medical Diagnosis: L CTR & CuTR and R CTR & CuTR  Surgical Procedure and Date: L CTR & CuTR, 06/07/2019; R CTR & CuTR, 03/02/20  Evaluation Date: 04/28/20  Insurance Authorization Period Expiration: 12/31/2020  Plan of Care Certification Period: 04/28/20 to 06/09/20  Date of Return to MD: TBD      Visit # / Visits authorized: 15 / 20     Time In: 2:35 pm    Time Out: 4:15 pm  Total Billable Time: 40 minutes (P, 1MT, 1TE)    Precautions:  Standard      Subjective     Pt reports: "I'm having a lot of pain in my pinky today. I don't know why"  she was compliant with home exercise program, including R SF LMB PIP Extension slplint  Response to previous treatment: able to lift moderate weighted objects and GMC improved  Functional change: patient able to use R hand in light ADL/IADL's without pain    Pain:  4-5/10  Location: right SF    Objective     Sincere received the following supervised modalities after being cleared for contraindications for 10 minutes as follows:   -Paraffin w/ MHP to B wrist/hand, pre-tx to decrease pain & increase tissue extensibility    Sincere eceived the following manual therapy techniques for 10 minutes:   -Edema mgmt w/ lymphatic drainage technique;  Deep STM, including MFR, CFM & scar mgmt to R elbow/forearm/wrist/hand, using hand techniques and IASTM tools to increase blood flow/circulation, improve soft tissue pliability and decrease pain.  -Kinesiotaping: Ulnar nerve decompression taping pattern applied to R upper arm to wrist for pain and scar tissue managemnent. Patient instructed on purpose, wear, care, precautions to monitor and removal of KT. Patient verbalized understanding of " all instructions provided.     Sincere received therapeutic exercises for 15 minutes including:  Wrist 3 ways over wedge 2/10 reps, 3# wt, L only   Smiles & frowns 2/10 reps, Green T-bar, B hands   Wrist Revs-2 ways 10 reps each way, green T-bar   PHG 2/10 reps, setting 3 B hands   Elbow 3 ways-full arc motion R=2/10, L=15 reps each, 3# wt       T-putty Yellow/green   -grasp 5 reps 5 sec hold, L only   -log rolls with tripod pinch 1 log each   -bottle cap turning-CW/CCW 5 times each way, B hands     Sincere received supervised modalities after being cleared for contraindications for 10 minutes, as follows:  -E-stim: NMES: 3.0 sec ramp, 10 sec on/10 sec off, 45 bps,, ch 1 intensity 25 for R RF/SF flexion & stimulation of Ulnar nerve    Home Exercises and Education Provided     Education provided:   - Reviewed HEP   - Progress towards goals     Written Home Exercises Provided: Patient instructed to cont prior HEP.  Exercises were reviewed and Sincere was able to demonstrate them prior to the end of the session.  Sincere demonstrated good  understanding of the HEP provided.     See EMR under Patient Instructions for exercises provided 05/26/20.      Assessment     Patient with an increase in R hand/SF pain today, possibly due to ulnar nerve irritation, as she is presenting with more R SF clawing posture.  Scar tissue is moderate to minimally dense at R elbow surgical site.  Patient was able to perform all therapeutic exercises listed above without any problems.  Patient has potential for further improvement with continued OT.  Recommend focus on strengthening of B elbow/froearm/wrist/hands.          Sincere is progressing well towards her goals and there are no updates to goals at this time. Pt prognosis is Good.     Pt will continue to benefit from skilled outpatient occupational therapy to address the deficits listed in the problem list on initial evaluation provide pt/family education and to maximize pt's level of independence in  the home and community environment.     Anticipated barriers to occupational therapy:  neuropathy    Pt's spiritual, cultural and educational needs considered and pt agreeable to plan of care and goals.    Plan     Updated Certification Period: 6/4/2020 to 07/14/20     Recommended Treatment Plan: 2 times per week for 6 weeks: Manual Therapy, Moist Heat/ Ice, Neuromuscular Re-ed, Paraffin, Patient Education, Self Care, Therapeutic Activites, Therapeutic Exercise and Ultrasound      Other Recommendations: progress strengthening further        Joseph Carlin, OT

## 2020-07-02 ENCOUNTER — CLINICAL SUPPORT (OUTPATIENT)
Dept: REHABILITATION | Facility: HOSPITAL | Age: 60
End: 2020-07-02
Payer: COMMERCIAL

## 2020-07-02 DIAGNOSIS — M62.81 MUSCLE WEAKNESS: ICD-10-CM

## 2020-07-02 DIAGNOSIS — G60.9 CHRONIC IDIOPATHIC AXONAL POLYNEUROPATHY: ICD-10-CM

## 2020-07-02 DIAGNOSIS — M25.632 STIFFNESS OF LEFT WRIST JOINT: ICD-10-CM

## 2020-07-02 DIAGNOSIS — M25.531 PAIN IN RIGHT WRIST: ICD-10-CM

## 2020-07-02 DIAGNOSIS — M79.602 LEFT ARM PAIN: ICD-10-CM

## 2020-07-02 DIAGNOSIS — M25.521 PAIN IN RIGHT ELBOW: ICD-10-CM

## 2020-07-02 PROCEDURE — 97110 THERAPEUTIC EXERCISES: CPT | Mod: PO

## 2020-07-02 PROCEDURE — 97018 PARAFFIN BATH THERAPY: CPT | Mod: PO,59

## 2020-07-02 PROCEDURE — 97140 MANUAL THERAPY 1/> REGIONS: CPT | Mod: PO

## 2020-07-02 NOTE — PROGRESS NOTES
"  Occupational Therapy Treatment Note     Date: 7/2/2020  Name: Sincere Riggins  Clinic Number: [id number]    Therapy Diagnosis:   Encounter Diagnoses   Name Primary?    Pain in right elbow     Pain in right wrist     Muscle weakness     Left arm pain     Stiffness of left wrist joint     Chronic idiopathic axonal polyneuropathy      Physician: Vadim Harp Jr*    Physician Orders: Evaluate & treat  Medical Diagnosis: L CTR & CuTR and R CTR & CuTR  Surgical Procedure and Date: L CTR & CuTR, 06/07/2019; R CTR & CuTR, 03/02/20  Evaluation Date: 04/28/20  Insurance Authorization Period Expiration: 12/31/2020  Plan of Care Certification Period: 04/28/20 to 06/09/20  Date of Return to MD: ARNULFOD      Visit # / Visits authorized: 15 / 20     Time In: 2:40 pm    Time Out: 3:15 pm  Total Billable Time: 35 minutes (P, 1MT, 1TE)    Precautions:  Standard      Subjective     Pt reports: "I think the KT tape helped the pain and I think the swelling in R SF is less today"  she was compliant with home exercise program, including R SF LMB PIP Extension slplint  Response to previous treatment: able to lift moderate weighted objects and GMC improved  Functional change: patient able to use R hand in light ADL/IADL's without pain    Pain:  2-3/10  Location: right SF    Objective     Sincere received the following supervised modalities after being cleared for contraindications for 10 minutes as follows:   -Paraffin w/ MHP to B wrist/hand, pre-tx to decrease pain & increase tissue extensibility    Sincere eceived the following manual therapy techniques for 10 minutes:   -Edema mgmt w/ lymphatic drainage technique;  Deep STM, including MFR, CFM & scar mgmt to R elbow/forearm/wrist/hand, using hand techniques and IASTM tools to increase blood flow/circulation, improve soft tissue pliability and decrease pain.  -Kinesiotaping: single spiral wrap to R RF with Ulnar nerve decompression taping pattern with space correction at the elbow and " wrist applied to R upper arm to wrist for pain and scar tissue managemnent.      Sincere received therapeutic exercises for 15 minutes including:  Wrist 3 ways over wedge 2/10 reps, 3# wt, L only   Smiles & frowns 2/10 reps, Green T-bar, B hands   Wrist Revs-2 ways 10 reps each way, green T-bar   PHG 3/10 reps, setting 3 B hands   Elbow 3 ways-full arc motion 2/10 reps each, 3# wt, L       T-putty Yellow/green   -grasp 5 reps 5 sec hold, L only   -bottle cap turning-CW/CCW 5 times each way, B hands   -dowel digs 3 min, L only     Sincere received supervised modalities after being cleared for contraindications, as follows: (performed simultaneous with TE)   -E-stim: NMES: Irish, Single channel, 3.0 sec ramp, 10 sec on/10 sec off, 45 bps,, ch 1 intensity 27 for R RF/SF flexion & stimulation of Ulnar nerve    Home Exercises and Education Provided     Education provided:   - Reviewed HEP   - Progress towards goals     Written Home Exercises Provided: Patient instructed to cont prior HEP.  Exercises were reviewed and Sincere was able to demonstrate them prior to the end of the session.  Sincere demonstrated good  understanding of the HEP provided.     See EMR under Patient Instructions for exercises provided 05/26/20.      Assessment     Patient returns to clinic today with less pain in her R hand/SF. She is also noted to have less edema in her R hand/SF.   Scar tissue is minimally dense at R elbow & wrist/hand surgical sites.  Patient was able to perform all therapeutic exercises listed above without any problems.  Patient has potential for further improvement with continued OT.  Recommend focus on strengthening of B elbow/froearm/wrist/hands.          Sincere is progressing well towards her goals and there are no updates to goals at this time. Pt prognosis is Good.     Pt will continue to benefit from skilled outpatient occupational therapy to address the deficits listed in the problem list on initial evaluation provide pt/family  education and to maximize pt's level of independence in the home and community environment.     Anticipated barriers to occupational therapy:  neuropathy    Pt's spiritual, cultural and educational needs considered and pt agreeable to plan of care and goals.    Plan     Updated Certification Period: 6/4/2020 to 07/14/20     Recommended Treatment Plan: 2 times per week for 6 weeks: Manual Therapy, Moist Heat/ Ice, Neuromuscular Re-ed, Paraffin, Patient Education, Self Care, Therapeutic Activites, Therapeutic Exercise and Ultrasound      Other Recommendations: progress strengthening further        Joseph Carlin, OT

## 2020-07-07 ENCOUNTER — CLINICAL SUPPORT (OUTPATIENT)
Dept: REHABILITATION | Facility: HOSPITAL | Age: 60
End: 2020-07-07
Payer: COMMERCIAL

## 2020-07-07 DIAGNOSIS — G60.9 CHRONIC IDIOPATHIC AXONAL POLYNEUROPATHY: ICD-10-CM

## 2020-07-07 DIAGNOSIS — M62.81 MUSCLE WEAKNESS: ICD-10-CM

## 2020-07-07 DIAGNOSIS — M25.521 PAIN IN RIGHT ELBOW: ICD-10-CM

## 2020-07-07 DIAGNOSIS — M25.531 PAIN IN RIGHT WRIST: ICD-10-CM

## 2020-07-07 DIAGNOSIS — M79.602 LEFT ARM PAIN: ICD-10-CM

## 2020-07-07 DIAGNOSIS — M25.632 STIFFNESS OF LEFT WRIST JOINT: ICD-10-CM

## 2020-07-07 PROCEDURE — 97140 MANUAL THERAPY 1/> REGIONS: CPT | Mod: PO

## 2020-07-07 PROCEDURE — 97018 PARAFFIN BATH THERAPY: CPT | Mod: PO,59

## 2020-07-07 PROCEDURE — 97035 APP MDLTY 1+ULTRASOUND EA 15: CPT | Mod: PO

## 2020-07-07 NOTE — PROGRESS NOTES
"  Occupational Therapy Treatment Note     Date: 7/7/2020  Name: Sincere Riggins  Clinic Number: [id number]    Therapy Diagnosis:   Encounter Diagnoses   Name Primary?    Pain in right elbow     Pain in right wrist     Muscle weakness     Left arm pain     Stiffness of left wrist joint     Chronic idiopathic axonal polyneuropathy      Physician: Vadim Harp Jr*    Physician Orders: Evaluate & treat  Medical Diagnosis: L CTR & CuTR and R CTR & CuTR  Surgical Procedure and Date: L CTR & CuTR, 06/07/2019; R CTR & CuTR, 03/02/20  Evaluation Date: 04/28/20  Insurance Authorization Period Expiration: 12/31/2020  Plan of Care Certification Period: 04/28/20 to 06/09/20  Date of Return to MD: ARNULFOD      Visit # / Visits authorized: 18 / 20     Time In: 2:55 pm    Time Out: 3:30 pm  Total Billable Time: 35 minutes (P, 1US, 2MT)    Precautions:  Standard      Subjective     Pt reports: "I'm having less pain in the pinky today, but it is still there"  she was compliant with home exercise program, including R SF LMB PIP Extension slplint  Response to previous treatment: able to lift moderate weighted objects and GMC improved  Functional change: patient able to use R hand in light ADL/IADL's without pain    Pain:  2/10  Location: right SF    Objective     Sincere received the following direct contact modalities after being cleared for contraindications for 10 minutes:   -3.3 MHz, 1.0 W/cm2, continuous, to R wrist/hand, to decrease pain & edema, increase circulation and tissue extensibility  Sincere received the following supervised modalities after being cleared for contraindications as follows: (simultaneously performed with US of L hand)  -Paraffin w/ MHP to B wrist/hand, pre-tx to decrease pain & increase tissue extensibility    Sincere eceived the following manual therapy techniques for 25 minutes:   -Edema mgmt w/ lymphatic drainage technique;  Extensive deep STM, including MFR, CFM & scar mgmt to R elbow/forearm/wrist/hand, " using hand techniques and IASTM tools to increase blood flow/circulation, improve soft tissue pliability and decrease pain.  -Kinesiotaping: Ulnar nerve decompression taping pattern with space correction at the elbow and wrist applied to R upper arm to wrist for pain and scar tissue managemnent.      Sincere Post    Home Exercises and Education Provided     Education provided:   - Reviewed HEP   - Progress towards goals     Written Home Exercises Provided: Patient instructed to cont prior HEP.  Exercises were reviewed and Sincere was able to demonstrate them prior to the end of the session.  Sincere demonstrated good  understanding of the HEP provided.     See EMR under Patient Instructions for exercises provided 05/26/20.      Assessment     Patient is noted to have less pain in her R SF, however, pain remains a problems.  Patient is noted to have a small nodule in the palm of her R hand just below MCP's of RF & SF, but is not TTP.  She is also noted to have a small amount of thickened fibrotic tissue at her R SF PIP joint over the volar aspect, but this decrease after IASTM therapy.  Recommend focus on strengthening of B elbow/froearm/wrist/hands.          Sincere is progressing well towards her goals and there are no updates to goals at this time. Pt prognosis is Good.     Pt will continue to benefit from skilled outpatient occupational therapy to address the deficits listed in the problem list on initial evaluation provide pt/family education and to maximize pt's level of independence in the home and community environment.     Anticipated barriers to occupational therapy:  neuropathy    Pt's spiritual, cultural and educational needs considered and pt agreeable to plan of care and goals.    Plan     Updated Certification Period: 6/4/2020 to 07/14/20     Recommended Treatment Plan: 2 times per week for 6 weeks: Manual Therapy, Moist Heat/ Ice, Neuromuscular Re-ed, Paraffin, Patient Education, Self Care, Therapeutic  Activites, Therapeutic Exercise and Ultrasound      Other Recommendations: progress strengthening further        Joseph Carlin, OT

## 2020-07-09 ENCOUNTER — CLINICAL SUPPORT (OUTPATIENT)
Dept: REHABILITATION | Facility: HOSPITAL | Age: 60
End: 2020-07-09
Payer: COMMERCIAL

## 2020-07-09 DIAGNOSIS — G60.9 CHRONIC IDIOPATHIC AXONAL POLYNEUROPATHY: ICD-10-CM

## 2020-07-09 DIAGNOSIS — M25.521 PAIN IN RIGHT ELBOW: ICD-10-CM

## 2020-07-09 DIAGNOSIS — M25.531 PAIN IN RIGHT WRIST: ICD-10-CM

## 2020-07-09 DIAGNOSIS — M62.81 MUSCLE WEAKNESS: ICD-10-CM

## 2020-07-09 DIAGNOSIS — M25.632 STIFFNESS OF LEFT WRIST JOINT: ICD-10-CM

## 2020-07-09 DIAGNOSIS — M79.602 LEFT ARM PAIN: ICD-10-CM

## 2020-07-09 PROCEDURE — 97110 THERAPEUTIC EXERCISES: CPT | Mod: PO

## 2020-07-09 PROCEDURE — 97018 PARAFFIN BATH THERAPY: CPT | Mod: PO,59

## 2020-07-09 PROCEDURE — 97035 APP MDLTY 1+ULTRASOUND EA 15: CPT | Mod: PO

## 2020-07-09 PROCEDURE — 97140 MANUAL THERAPY 1/> REGIONS: CPT | Mod: PO

## 2020-07-09 NOTE — PROGRESS NOTES
"  Occupational Therapy Treatment Note     Date: 7/9/2020  Name: Sincere Riggins  Clinic Number: [id number]    Therapy Diagnosis:   Encounter Diagnoses   Name Primary?    Pain in right elbow     Pain in right wrist     Muscle weakness     Left arm pain     Stiffness of left wrist joint     Chronic idiopathic axonal polyneuropathy      Physician: Vadim Harp Jr*    Physician Orders: Evaluate & treat  Medical Diagnosis: L CTR & CuTR and R CTR & CuTR  Surgical Procedure and Date: L CTR & CuTR, 06/07/2019; R CTR & CuTR, 03/02/20  Evaluation Date: 04/28/20  Insurance Authorization Period Expiration: 12/31/2020  Plan of Care Certification Period: 04/28/20 to 06/09/20  Date of Return to MD: ARNULFOD      Visit # / Visits authorized: 18 / 20     Time In: 2:10 pm    Time Out: 2:45 pm  Total Billable Time: 35 minutes (P, 1US, 1MT, 1TE)    Precautions:  Standard      Subjective     Pt reports: "the manual work you did on my pinky last visit really helped relieve the pain".  "My weights were delivered and I've been using them to exercise both of my arms"  she was compliant with home exercise program, including R SF LMB PIP Extension slplint  Response to previous treatment: able to lift moderate weighted objects and GMC improved  Functional change: patient able to use R hand in light ADL/IADL's without pain    Pain:  1-2/10  Location: right SF    Objective     Sincere received the following direct contact modalities after being cleared for contraindications for 10 minutes:   -3.3 MHz, .8 W/cm2, continuous, to R wrist/hand, to decrease pain & edema, increase circulation and tissue extensibility  Sincere received the following supervised modalities after being cleared for contraindications as follows: (simultaneously performed with US of L hand)  -Paraffin w/ MHP to B wrist/hand, pre-tx to decrease pain & increase tissue extensibility    Sincere eceived the following manual therapy techniques for 15 minutes:   -Edema mgmt w/ " lymphatic drainage technique;  Extensive deep STM, including MFR, CFM & scar mgmt to R elbow/forearm/wrist/hand, using hand techniques and IASTM tools to increase blood flow/circulation, improve soft tissue pliability and decrease pain.  -      Sincere received therapeutic exercises for 10 minutes including:Wrist 3 ways over wedge 2/10 reps, 3# wt, L only   Smiles & frowns 2/10 reps, Green T-bar, B hands   Wrist Revs-2 ways 2/10 reps each way, green T-bar   PHG 3/10 reps, setting 3 B hands   Elbow 3 ways-full arc motion 2/10 reps each, 3# wt, L           Home Exercises and Education Provided     Education provided:   - Reviewed HEP   - Progress towards goals     Written Home Exercises Provided: Patient instructed to cont prior HEP.  Exercises were reviewed and Sincere was able to demonstrate them prior to the end of the session.  Sincere demonstrated good  understanding of the HEP provided.     See EMR under Patient Instructions for exercises provided 05/26/20.      Assessment     Pain in her R SF continues to decrease, but is not completely resolved. Scar/fibrotic tissue is also noted to be less today in her R SF, but remains a problem.  She was able to perform all therapeutic exercises today without any difficulty or increase in pain.  Recommend focus on strengthening of B elbow/froearm/wrist/hands.          Sincere is progressing well towards her goals and there are no updates to goals at this time. Pt prognosis is Good.     Pt will continue to benefit from skilled outpatient occupational therapy to address the deficits listed in the problem list on initial evaluation provide pt/family education and to maximize pt's level of independence in the home and community environment.     Anticipated barriers to occupational therapy:  neuropathy    Pt's spiritual, cultural and educational needs considered and pt agreeable to plan of care and goals.    Plan     Updated Certification Period: 6/4/2020 to 07/14/20     Recommended  Treatment Plan: 2 times per week for 6 weeks: Manual Therapy, Moist Heat/ Ice, Neuromuscular Re-ed, Paraffin, Patient Education, Self Care, Therapeutic Activites, Therapeutic Exercise and Ultrasound      Other Recommendations: progress strengthening further        Joseph Carlin, OT

## 2020-07-28 ENCOUNTER — CLINICAL SUPPORT (OUTPATIENT)
Dept: REHABILITATION | Facility: HOSPITAL | Age: 60
End: 2020-07-28
Payer: COMMERCIAL

## 2020-07-28 DIAGNOSIS — M25.632 STIFFNESS OF LEFT WRIST JOINT: ICD-10-CM

## 2020-07-28 DIAGNOSIS — M79.602 LEFT ARM PAIN: ICD-10-CM

## 2020-07-28 DIAGNOSIS — G60.9 CHRONIC IDIOPATHIC AXONAL POLYNEUROPATHY: ICD-10-CM

## 2020-07-28 DIAGNOSIS — M25.531 PAIN IN RIGHT WRIST: ICD-10-CM

## 2020-07-28 DIAGNOSIS — M25.521 PAIN IN RIGHT ELBOW: ICD-10-CM

## 2020-07-28 DIAGNOSIS — M62.81 MUSCLE WEAKNESS: ICD-10-CM

## 2020-07-28 PROCEDURE — 97110 THERAPEUTIC EXERCISES: CPT | Mod: PO

## 2020-07-28 PROCEDURE — 97140 MANUAL THERAPY 1/> REGIONS: CPT | Mod: PO

## 2020-07-28 PROCEDURE — 97018 PARAFFIN BATH THERAPY: CPT | Mod: PO

## 2020-07-28 NOTE — PROGRESS NOTES
Occupational Therapy Treatment Note     Date: 7/28/2020  Name: Sincere Riggins  Clinic Number: [id number]    Therapy Diagnosis:   Encounter Diagnoses   Name Primary?    Pain in right elbow     Pain in right wrist     Muscle weakness     Left arm pain     Stiffness of left wrist joint     Chronic idiopathic axonal polyneuropathy      Physician: Vadim Harp Jr*    Physician Orders: Evaluate & treat  Medical Diagnosis: L CTR & CuTR and R CTR & CuTR  Surgical Procedure and Date: L CTR & CuTR, 06/07/2019; R CTR & CuTR, 03/02/20  Evaluation Date: 04/28/20  Insurance Authorization Period Expiration: 12/31/2020  Plan of Care Certification Period: 04/28/20 to 06/09/20  Date of Return to MD: TBD      Visit # / Visits authorized: 1 / 20 (from referral 07/07/20)    Time In: 12:10 pm    Time Out: 12:45 pm  Total Billable Time: 35 minutes (P, 1MT, 1TE)    Precautions:  Standard      Subjective     Pt reports: she feels she is slowly gaining more strength in BUE's, but continues to have deficits that adversely affect ADL/IADL's  she was compliant with home exercise program, including R SF LMB PIP Extension slplint  Response to previous treatment: able to lift moderate weighted objects and GMC improved  Functional change: patient able to use R hand in light ADL/IADL's without pain    Pain:  1-2/10  Location: right SF    Objective     Sincere Post received the following supervised modalities after being cleared for contraindications, 10 minutes, as follows:   -Paraffin w/ MHP to B wrist/hand, pre-tx to decrease pain & increase tissue extensibility    Sincere eceived the following manual therapy techniques for 10 minutes:   -Edema mgmt w/ lymphatic drainage technique;  Extensive deep STM, including MFR, CFM & scar mgmt to R elbow/forearm/wrist/hand, using hand techniques and IASTM tools to increase blood flow/circulation, improve soft tissue pliability and decrease pain.      Sincere received therapeutic exercises for 15  minutes including:    Objective measures taken today, see Updated Plan of Care for details      T-putty Yellow/green   -grasp 5 reps 5 sec hold, L only   -log rolls with tripod pinch 1 log each       Home Exercises and Education Provided     Education provided:   - Reviewed HEP   - Progress towards goals     Written Home Exercises Provided: Patient instructed to cont prior HEP.  Exercises were reviewed and Sincere was able to demonstrate them prior to the end of the session.  Sincere demonstrated good  understanding of the HEP provided.     See EMR under Patient Instructions for exercises provided 05/26/20.      Assessment     See Updated Plan of Care for details on progress and status of goals.           Sincere is progressing well towards her goals and there are no updates to goals at this time. Pt prognosis is Good.     Pt will continue to benefit from skilled outpatient occupational therapy to address the deficits listed in the problem list on initial evaluation provide pt/family education and to maximize pt's level of independence in the home and community environment.     Anticipated barriers to occupational therapy:  neuropathy    Pt's spiritual, cultural and educational needs considered and pt agreeable to plan of care and goals.    Plan     See Updated Plan of Care for details and recommendations for treatment plan.         Joseph Carlin, OT

## 2020-07-28 NOTE — PLAN OF CARE
Outpatient Therapy Updated Plan of Care     Visit Date: 7/28/2020  Name: Sincere Riggins  Clinic Number: [id number]    Therapy Diagnosis:   Encounter Diagnoses   Name Primary?    Pain in right elbow     Pain in right wrist     Muscle weakness     Left arm pain     Stiffness of left wrist joint     Chronic idiopathic axonal polyneuropathy      Physician: Vadim Harp Jr*    Physician Orders: Evaluate & treat  Medical Diagnosis: L CTR & CuTR and R CTR & CuTR  Surgical Procedure and Date: L CTR & CuTR, 06/07/2019; R CTR & CuTR, 03/02/20  Evaluation Date: 04/28/20    Total Visits Received: 21  Cancelled Visits: 3  No Show Visits: 0    Current Certification Period:  04/29/20 to 06/09/20  Precautions:  Standard  Visits from Evaluation Date:  21  Functional Level Prior to Evaluation:  Independent    Subjective     Pain:  Functional Pain Scale Rating 0-10:   1/10 on average  0/10 at best  3/10 at worst  Location: R wrist/hand  Description: Aching at times, Tingling, Numbness   Aggravating Factors: Night Time and with excess use of R hand, typing or writing  Easing Factors: OTC pain medication, ice, rest and elevation    Objective     Update: Sacr tissue is minimally dense at R wrist/hand and minimally dense at R elbow, without adhesions to underlying soft tissue.  Patient is noted to have moderate intrinsic muscle atrophy in her L hand and minimal in her R hand.       Edema. Measured in centimeters.   04/29/20 04/29/30 06/4/20 07/28/20    Left Right Right Right   Elbow crease 23.0 24.0 24.0 (=) 24.1 (=)   2in. Below elbow 22.4 23.5 23.1 (-.4) 23.0 (-.1)   Wrist Crease 14.5 14.5 14.7 (+.2) 14.7 (=)   Mid palm 17.0 18.5 18.0 (-.5) 18.0 (=)   MCPs 18.2 18.5 18.7 (+.2) 18.7 (=)      04/29/20 04/29/20 06/4/20 07/28/20    Left Right Right Right   Elbow Ext/Flex 0/150 0/145 0/150 (+5) 0/152 (+2)   Forearm Sup/pron WNL WNL WNL WNL   Wrist E/F 65/70 60/70 65 (+5) / 70 (=) 65/70 (=)   Wrist RD/UD 24/35 20/35 20 (=) / 40  (+5) 20/40 (=)   Thumb R/P Abd 45/50 45/47 55 (+10) / 55 (+8) 55/55 (=)   Thumb MP flex 61 64 69 (+5) 69 (=)   Thumb IP flex 80 65 69 (+5) 69 (=)   Thumb [place] To SF DPC To SF DPC To SF DPC (=) To SF DPC       Hand ROM:  Full composite fist WNL, [place]    Sensation: Patient continues to report numbness and tingling over the ulnar aspect of the R /hand/SF, however, this is an improvement since last Update.  Light touch and temp are intact in BUE's     Strength (Dyanmometer) and Pinch Strength (Pinch Gauge)  Measured in pounds and psi.    04/29/20 04/29/20 06/4/20 06/4/20 07/28/20 07/28/20    Left Right Left Right Left Right   Rung II 31.5 40 35 (+3.5) 43 (+3) 35 (=) 38 (-5)   Key Pinch 4 6 4 (=) 6 (=) 6 (+2) 7 (+1)   3pt Pinch 2.5 4.5 3 (+.5) 6 (+1.5) 4 (+1) 7 (+1)   2pt Pinch 3 3 2 (-1) 2 (-1) 4 (+2) 5 (+3)       Assessment     Update: Patient continues to make steady gains in strength and coordination of B elbow/forearm/wrist/hand.  AROM in B elbow/forearm/wrist/hand is WNL.  Her pain level continues to be minimal.  She continues to struggle with strength deficits in B wrist/hands, affecting her functioning in ADL/IADL's.  She would benefit from continued OT to address areas of deficit to maximize functioning to highest level.     Goals:   Short Term Goals: (in 2 weeks)  1) Patient will be independent in HEP-met  2) Decrease pain in R elbow/wrist/hand to no more than 3-4/10 worst in ADL/IADL's-met  3) Increase AROM in R wrist ext by 3-5 degrees for improved functioning in ADL/IADL's-met  4) Increase B  strength by 3-5 psi for increased functional use-met/ongoing  5) Decrease edema in R elbow/wrist by .2 cm-met    Long Term Goals: (in 6 weeks)  1) Decrease pain in RUE to no more than 1-2/10 worst in ADL/IADL's-part met/ongoing  2) Increase AROM of B elbow/wrist/hand to WFL for increased functioning in ADL/IADL's-met  3) Increase strength in B  & pinch by 25% of initial measures for improved functioning in  ADL/IADL's-ongoing  4) Increased functioning in ADL/IADL's, as evidenced by a FOTO impairment rating of no more than 25%-ongoing  5) Decrease edema in R elbow/wrist to trace or none-met    Previous Short Term Goals Status:   5/5 STG's met  New Short Term Goals Status:   Progress to LTG's  Long Term Goal Status:   continue per initial plan of care.  Reasons for Recertification of Therapy:   Updated Plan of Care needed    Plan     Updated Certification Period: 07/14/20 to 09/07/20    Recommended Treatment Plan: 2 times per week for 6 weeks: Manual Therapy, Moist Heat/ Ice, Neuromuscular Re-ed, Paraffin, Patient Education, Self Care, Therapeutic Activites, Therapeutic Exercise and Ultrasound     Other Recommendations: progress strengthening further    Joseph Carlin OT  7/28/2020      I CERTIFY THE NEED FOR THESE SERVICES FURNISHED UNDER THIS PLAN OF TREATMENT AND WHILE UNDER MY CARE    Physician's comments:        Physician's Signature: ___________________________________________________

## 2020-07-30 ENCOUNTER — CLINICAL SUPPORT (OUTPATIENT)
Dept: REHABILITATION | Facility: HOSPITAL | Age: 60
End: 2020-07-30
Payer: COMMERCIAL

## 2020-07-30 DIAGNOSIS — M62.81 MUSCLE WEAKNESS: ICD-10-CM

## 2020-07-30 DIAGNOSIS — M25.521 PAIN IN RIGHT ELBOW: ICD-10-CM

## 2020-07-30 DIAGNOSIS — M25.531 PAIN IN RIGHT WRIST: ICD-10-CM

## 2020-07-30 DIAGNOSIS — G60.9 CHRONIC IDIOPATHIC AXONAL POLYNEUROPATHY: ICD-10-CM

## 2020-07-30 DIAGNOSIS — M25.632 STIFFNESS OF LEFT WRIST JOINT: ICD-10-CM

## 2020-07-30 DIAGNOSIS — M79.602 LEFT ARM PAIN: ICD-10-CM

## 2020-07-30 PROCEDURE — 97140 MANUAL THERAPY 1/> REGIONS: CPT | Mod: PO

## 2020-07-30 PROCEDURE — 97018 PARAFFIN BATH THERAPY: CPT | Mod: PO,59

## 2020-07-30 PROCEDURE — 97110 THERAPEUTIC EXERCISES: CPT | Mod: PO

## 2020-07-30 NOTE — PROGRESS NOTES
"  Occupational Therapy Treatment Note     Date: 7/30/2020  Name: Sincere Riggins  Clinic Number: [id number]    Therapy Diagnosis:   Encounter Diagnoses   Name Primary?    Pain in right elbow     Pain in right wrist     Muscle weakness     Left arm pain     Stiffness of left wrist joint     Chronic idiopathic axonal polyneuropathy      Physician: Vadim Harp Jr*    Physician Orders: Evaluate & treat  Medical Diagnosis: L CTR & CuTR and R CTR & CuTR  Surgical Procedure and Date: L CTR & CuTR, 06/07/2019; R CTR & CuTR, 03/02/20  Evaluation Date: 04/28/20  Insurance Authorization Period Expiration: 12/31/2020  Plan of Care Certification Period: 04/28/20 to 06/09/20  Date of Return to MD: TBD      Visit # / Visits authorized: 2 / 20 (from referral 07/07/20)    Time In: 1:20 pm    Time Out: 2:05 pm  Total Billable Time: 45 minutes (P, 1MT, 1TE)    Precautions:  Standard      Subjective     Pt reports: "I'm getting more feeling in my hand. I think the nerve is regenerating"  she was compliant with home exercise program, including R SF LMB PIP Extension slplint  Response to previous treatment: able to lift moderate weighted objects and GMC improved  Functional change: patient able to use R hand in light ADL/IADL's without pain    Pain:  1/10  Location: right SF    Objective     Sincere received the following supervised modalities after being cleared for contraindications, 10 minutes, as follows:  -Paraffin w/ MHP to B wrist/hand, pre-tx to decrease pain & increase tissue extensibility    Sincere eceived the following manual therapy techniques for 10 minutes:   -Edema mgmt w/ lymphatic drainage technique;  Extensive deep STM, including MFR, CFM & scar mgmt to R elbow/forearm/wrist/hand, using hand techniques and IASTM tools to increase blood flow/circulation, improve soft tissue pliability and decrease pain.    Sincere received therapeutic exercises for 25 minutes including:Wrist 3 ways over wedge 2/10 reps, 3# wt,  "   Smiles & frowns 2/10 reps, Green T-bar, B hands   Wrist Revs-2 ways 2/10 reps each way, green T-bar   PHG 3/10 reps, setting 3 B hands   Elbow 3 ways-full arc motion 2/10 reps each, 3# wt,        T-putty Yellow/green   -grasp 5 reps 5 sec hold, L only   -log rolls with tripod pinch 1 log each   -dowel digs 3 min, L only       Home Exercises and Education Provided     Education provided:   - Reviewed HEP   - Progress towards goals     Written Home Exercises Provided: Patient instructed to cont prior HEP.  Exercises were reviewed and Sincere was able to demonstrate them prior to the end of the session.  Sincere demonstrated good  understanding of the HEP provided.     See EMR under Patient Instructions for exercises provided 05/26/20.      Assessment     Patient noted to have an improvement in sensation in ulnar nerve distribution, as patient is getting more feeling in her RF/SF of her R hand.  Patient also feels she is getting a little stronger with her L hand, but progress is slow.  Recommend to continue OT per Plan of Care and progress as tolerated    Sincere is progressing well towards her goals and there are no updates to goals at this time. Pt prognosis is Good.     Pt will continue to benefit from skilled outpatient occupational therapy to address the deficits listed in the problem list on initial evaluation provide pt/family education and to maximize pt's level of independence in the home and community environment.     Anticipated barriers to occupational therapy:  neuropathy    Pt's spiritual, cultural and educational needs considered and pt agreeable to plan of care and goals.    Goals:   Short Term Goals: (in 2 weeks)  1) Patient will be independent in HEP-met  2) Decrease pain in R elbow/wrist/hand to no more than 3-4/10 worst in ADL/IADL's-met  3) Increase AROM in R wrist ext by 3-5 degrees for improved functioning in ADL/IADL's-met  4) Increase B  strength by 3-5 psi for increased functional  use-met/ongoing  5) Decrease edema in R elbow/wrist by .2 cm-met     Long Term Goals: (in 6 weeks)  1) Decrease pain in RUE to no more than 1-2/10 worst in ADL/IADL's-part met/ongoing  2) Increase AROM of B elbow/wrist/hand to WFL for increased functioning in ADL/IADL's-met  3) Increase strength in B  & pinch by 25% of initial measures for improved functioning in ADL/IADL's-ongoing  4) Increased functioning in ADL/IADL's, as evidenced by a FOTO impairment rating of no more than 25%-ongoing  5) Decrease edema in R elbow/wrist to trace or none-met    Plan     Updated Certification Period: 07/14/20 to 09/07/20     Recommended Treatment Plan: 2 times per week for 6 weeks: Manual Therapy, Moist Heat/ Ice, Neuromuscular Re-ed, Paraffin, Patient Education, Self Care, Therapeutic Activites, Therapeutic Exercise and Ultrasound      Other Recommendations: progress strengthening further        Joseph Carlin, OT

## 2020-08-03 ENCOUNTER — CLINICAL SUPPORT (OUTPATIENT)
Dept: REHABILITATION | Facility: HOSPITAL | Age: 60
End: 2020-08-03
Payer: COMMERCIAL

## 2020-08-03 DIAGNOSIS — M25.531 PAIN IN RIGHT WRIST: ICD-10-CM

## 2020-08-03 DIAGNOSIS — M25.521 PAIN IN RIGHT ELBOW: ICD-10-CM

## 2020-08-03 DIAGNOSIS — G60.9 CHRONIC IDIOPATHIC AXONAL POLYNEUROPATHY: ICD-10-CM

## 2020-08-03 DIAGNOSIS — M79.602 LEFT ARM PAIN: ICD-10-CM

## 2020-08-03 DIAGNOSIS — M62.81 MUSCLE WEAKNESS: ICD-10-CM

## 2020-08-03 DIAGNOSIS — M25.632 STIFFNESS OF LEFT WRIST JOINT: ICD-10-CM

## 2020-08-03 PROCEDURE — 97140 MANUAL THERAPY 1/> REGIONS: CPT | Mod: PO

## 2020-08-03 PROCEDURE — 97018 PARAFFIN BATH THERAPY: CPT | Mod: PO

## 2020-08-03 PROCEDURE — 97110 THERAPEUTIC EXERCISES: CPT | Mod: PO

## 2020-08-03 NOTE — PROGRESS NOTES
Occupational Therapy Treatment Note     Date: 8/3/2020  Name: Sincere Riggins  Clinic Number: [id number]    Therapy Diagnosis:   Encounter Diagnoses   Name Primary?    Pain in right elbow     Pain in right wrist     Muscle weakness     Left arm pain     Stiffness of left wrist joint     Chronic idiopathic axonal polyneuropathy      Physician: Vadim Harp Jr*    Physician Orders: Evaluate & treat  Medical Diagnosis: L CTR & CuTR and R CTR & CuTR  Surgical Procedure and Date: L CTR & CuTR, 06/07/2019; R CTR & CuTR, 03/02/20  Evaluation Date: 04/28/20  Insurance Authorization Period Expiration: 12/31/2020  Plan of Care Certification Period: 04/28/20 to 06/09/20  Date of Return to MD: TBD      Visit # / Visits authorized:  3  / 20 (from referral 07/07/20)    Time In: 1:50 pm    Time Out:  2:30 pm  Total Billable Time: 40 minutes (P, 1MT, 1TE)    Precautions:  Standard      Subjective     Pt reports: she is not sure if the gabapentin is helping with the nerve pain. She expressed interest in speaking to her doctor about pregabalin/Lyrica  she was compliant with home exercise program, including R SF LMB PIP Extension slplint  Response to previous treatment: able to lift moderate weighted objects and GMC improved  Functional change: patient able to use R hand in light ADL/IADL's without pain    Pain:  1-2/10  Location: right SF    Objective     Sincere received the following supervised modalities after being cleared for contraindications, 10 minutes, as follows:  -Paraffin w/ MHP to B wrist/hand, pre-tx to decrease pain & increase tissue extensibility    Sincere eceived the following manual therapy techniques for 10 minutes:   -Edema mgmt w/ lymphatic drainage technique;  Extensive deep STM, including MFR, CFM & scar mgmt to R elbow/forearm/wrist/hand, using hand techniques and IASTM tools to increase blood flow/circulation, improve soft tissue pliability and decrease pain.    Sincere received therapeutic exercises for  25 minutes including:Wrist 3 ways over wedge 2/10 reps, 3# wt,    Smiles & frowns 2/10 reps, Green T-bar, B hands   Wrist Revs-2 ways 2/10 reps each way, green T-bar   PHG 3/10 reps, setting 3 B hands   Elbow 3 ways-full arc motion 10 reps each, 4# wt,        T-putty Yellow/green   -grasp 5 reps 5 sec hold, L only   -log rolls with tripod pinch 1 log each   -bottle cap turning-CW/CCW 5 times each way, B hands   -dowel digs 2 min each hand   -rotation tool-CW/CCW 5 reps each way       Home Exercises and Education Provided     Education provided:   - Reviewed HEP   - Progress towards goals     Written Home Exercises Provided: Patient instructed to cont prior HEP.  Exercises were reviewed and Sincere was able to demonstrate them prior to the end of the session.  Sincere demonstrated good  understanding of the HEP provided.     See EMR under Patient Instructions for exercises provided 05/26/20.      Assessment     Patient had some fatigue in BUE's by end of treatment today, but she was able to complete all listed therapeutic exercises.  Recommend to continue OT per Plan of Care and progress as tolerated    Sincere is progressing well towards her goals and there are no updates to goals at this time. Pt prognosis is Good.     Pt will continue to benefit from skilled outpatient occupational therapy to address the deficits listed in the problem list on initial evaluation provide pt/family education and to maximize pt's level of independence in the home and community environment.     Anticipated barriers to occupational therapy:  neuropathy    Pt's spiritual, cultural and educational needs considered and pt agreeable to plan of care and goals.    Goals:   Short Term Goals: (in 2 weeks)  1) Patient will be independent in HEP-met  2) Decrease pain in R elbow/wrist/hand to no more than 3-4/10 worst in ADL/IADL's-met  3) Increase AROM in R wrist ext by 3-5 degrees for improved functioning in ADL/IADL's-met  4) Increase B  strength by  3-5 psi for increased functional use-met/ongoing  5) Decrease edema in R elbow/wrist by .2 cm-met     Long Term Goals: (in 6 weeks)  1) Decrease pain in RUE to no more than 1-2/10 worst in ADL/IADL's-part met/ongoing  2) Increase AROM of B elbow/wrist/hand to WFL for increased functioning in ADL/IADL's-met  3) Increase strength in B  & pinch by 25% of initial measures for improved functioning in ADL/IADL's-ongoing  4) Increased functioning in ADL/IADL's, as evidenced by a FOTO impairment rating of no more than 25%-ongoing  5) Decrease edema in R elbow/wrist to trace or none-met    Plan     Updated Certification Period: 07/14/20 to 09/07/20     Recommended Treatment Plan: 2 times per week for 6 weeks: Manual Therapy, Moist Heat/ Ice, Neuromuscular Re-ed, Paraffin, Patient Education, Self Care, Therapeutic Activites, Therapeutic Exercise and Ultrasound      Other Recommendations: progress strengthening further        Joseph Carlin, OT

## 2020-08-10 ENCOUNTER — CLINICAL SUPPORT (OUTPATIENT)
Dept: REHABILITATION | Facility: HOSPITAL | Age: 60
End: 2020-08-10
Payer: COMMERCIAL

## 2020-08-10 DIAGNOSIS — M79.602 LEFT ARM PAIN: ICD-10-CM

## 2020-08-10 DIAGNOSIS — M25.531 PAIN IN RIGHT WRIST: ICD-10-CM

## 2020-08-10 DIAGNOSIS — G60.9 CHRONIC IDIOPATHIC AXONAL POLYNEUROPATHY: ICD-10-CM

## 2020-08-10 DIAGNOSIS — M25.521 PAIN IN RIGHT ELBOW: ICD-10-CM

## 2020-08-10 DIAGNOSIS — M25.632 STIFFNESS OF LEFT WRIST JOINT: ICD-10-CM

## 2020-08-10 DIAGNOSIS — M62.81 MUSCLE WEAKNESS: ICD-10-CM

## 2020-08-10 PROCEDURE — 97018 PARAFFIN BATH THERAPY: CPT | Mod: PO,59

## 2020-08-10 PROCEDURE — 97110 THERAPEUTIC EXERCISES: CPT | Mod: PO

## 2020-08-10 PROCEDURE — 97140 MANUAL THERAPY 1/> REGIONS: CPT | Mod: PO

## 2020-08-10 NOTE — PROGRESS NOTES
Occupational Therapy Treatment Note     Date: 8/10/2020  Name: Sincere Riggins  Clinic Number: [id number]    Therapy Diagnosis:   Encounter Diagnoses   Name Primary?    Pain in right elbow     Pain in right wrist     Muscle weakness     Left arm pain     Stiffness of left wrist joint     Chronic idiopathic axonal polyneuropathy      Physician: Vadim Harp Jr*    Physician Orders: Evaluate & treat  Medical Diagnosis: L CTR & CuTR and R CTR & CuTR  Surgical Procedure and Date: L CTR & CuTR, 06/07/2019; R CTR & CuTR, 03/02/20  Evaluation Date: 04/28/20  Insurance Authorization Period Expiration: 12/31/2020  Plan of Care Certification Period: 04/28/20 to 06/09/20  Date of Return to MD: TBD      Visit # / Visits authorized:  3  / 20 (from referral 07/07/20)    Time In: 4:10 pm    Time Out: 4:50 pm  Total Billable Time: 40 minutes (P, 1MT, 1TE)    Precautions:  Standard      Subjective     Pt reports: she continues to perform scar management at home  she was compliant with home exercise program, including R SF LMB PIP Extension slplint  Response to previous treatment: able to lift moderate weighted objects and GMC improved  Functional change: patient able to use R hand in light ADL/IADL's without pain    Pain:  1-2/10  Location: right SF    Objective     Sincere received the following supervised modalities after being cleared for contraindications, 10 minutes, as follows:  -Paraffin w/ MHP to B wrist/hand, pre-tx to decrease pain & increase tissue extensibility    Sincere eceived the following manual therapy techniques for 10 minutes:   -Edema mgmt w/ lymphatic drainage technique;  Extensive deep STM, including MFR, CFM & scar mgmt to R elbow/forearm/wrist/hand, using hand techniques and IASTM tools to increase blood flow/circulation, improve soft tissue pliability and decrease pain.    Sincere received therapeutic exercises for 20 minutes including:Wrist 3 ways over wedge 2/10 reps, 3# wt,    Smiles & frowns 2/10  reps, Green T-bar, B hands   Wrist Revs-2 ways 2/10 reps each way, green T-bar   PHG 3/10 reps, setting 3 B hands   Elbow 3 ways-full arc motion 10 reps each, 4# wt,        T-putty Yellow/green   -grasp 5 reps 5 sec hold, L only   -log rolls with tripod pinch 1 log each   -bottle cap turning-CW/CCW 5 times each way, B hands   -dowel digs 2 min each hand   -rotation tool-CW/CCW 5 reps each way       Home Exercises and Education Provided     Education provided:   - Reviewed HEP   - Progress towards goals     Written Home Exercises Provided: Patient instructed to cont prior HEP.  Exercises were reviewed and Sincere was able to demonstrate them prior to the end of the session.  Sincere demonstrated good  understanding of the HEP provided.     See EMR under Patient Instructions for exercises provided 05/26/20.      Assessment     Patient with less fatigue in BUE's today and had no difficulty completing all above listed therapeutic exercises.  Recommend to continue OT per Plan of Care and progress as tolerated    Sincere is progressing well towards her goals and there are no updates to goals at this time. Pt prognosis is Good.     Pt will continue to benefit from skilled outpatient occupational therapy to address the deficits listed in the problem list on initial evaluation provide pt/family education and to maximize pt's level of independence in the home and community environment.     Anticipated barriers to occupational therapy:  neuropathy    Pt's spiritual, cultural and educational needs considered and pt agreeable to plan of care and goals.    Goals:   Short Term Goals: (in 2 weeks)  1) Patient will be independent in HEP-met  2) Decrease pain in R elbow/wrist/hand to no more than 3-4/10 worst in ADL/IADL's-met  3) Increase AROM in R wrist ext by 3-5 degrees for improved functioning in ADL/IADL's-met  4) Increase B  strength by 3-5 psi for increased functional use-met/ongoing  5) Decrease edema in R elbow/wrist by .2  cm-met     Long Term Goals: (in 6 weeks)  1) Decrease pain in RUE to no more than 1-2/10 worst in ADL/IADL's-part met/ongoing  2) Increase AROM of B elbow/wrist/hand to WFL for increased functioning in ADL/IADL's-met  3) Increase strength in B  & pinch by 25% of initial measures for improved functioning in ADL/IADL's-ongoing  4) Increased functioning in ADL/IADL's, as evidenced by a FOTO impairment rating of no more than 25%-ongoing  5) Decrease edema in R elbow/wrist to trace or none-met    Plan     Updated Certification Period: 07/14/20 to 09/07/20     Recommended Treatment Plan: 2 times per week for 6 weeks: Manual Therapy, Moist Heat/ Ice, Neuromuscular Re-ed, Paraffin, Patient Education, Self Care, Therapeutic Activites, Therapeutic Exercise and Ultrasound      Other Recommendations: progress strengthening further        Joseph Carlin, OT

## 2020-08-17 ENCOUNTER — CLINICAL SUPPORT (OUTPATIENT)
Dept: REHABILITATION | Facility: HOSPITAL | Age: 60
End: 2020-08-17
Payer: COMMERCIAL

## 2020-08-17 DIAGNOSIS — M62.81 MUSCLE WEAKNESS: ICD-10-CM

## 2020-08-17 DIAGNOSIS — M25.632 STIFFNESS OF LEFT WRIST JOINT: ICD-10-CM

## 2020-08-17 DIAGNOSIS — M79.602 LEFT ARM PAIN: ICD-10-CM

## 2020-08-17 DIAGNOSIS — M25.521 PAIN IN RIGHT ELBOW: ICD-10-CM

## 2020-08-17 DIAGNOSIS — M25.531 PAIN IN RIGHT WRIST: ICD-10-CM

## 2020-08-17 DIAGNOSIS — G60.9 CHRONIC IDIOPATHIC AXONAL POLYNEUROPATHY: ICD-10-CM

## 2020-08-17 PROCEDURE — 97018 PARAFFIN BATH THERAPY: CPT | Mod: PO,59

## 2020-08-17 PROCEDURE — 97110 THERAPEUTIC EXERCISES: CPT | Mod: PO

## 2020-08-17 PROCEDURE — 97140 MANUAL THERAPY 1/> REGIONS: CPT | Mod: PO

## 2020-08-17 NOTE — PROGRESS NOTES
Occupational Therapy Treatment Note     Date: 8/17/2020  Name: Sincere Riggins  Clinic Number: [id number]    Therapy Diagnosis:   Encounter Diagnoses   Name Primary?    Pain in right elbow     Pain in right wrist     Muscle weakness     Left arm pain     Stiffness of left wrist joint     Chronic idiopathic axonal polyneuropathy      Physician: Vadim Harp Jr*    Physician Orders: Evaluate & treat  Medical Diagnosis: L CTR & CuTR and R CTR & CuTR  Surgical Procedure and Date: L CTR & CuTR, 06/07/2019; R CTR & CuTR, 03/02/20  Evaluation Date: 04/28/20  Insurance Authorization Period Expiration: 12/31/2020  Plan of Care Certification Period: 04/28/20 to 06/09/20  Date of Return to MD: TBD      Visit # / Visits authorized:  4 / 20 (from referral 07/07/20)    Time In: 4:15 pm    Time Out: 4:45 pm  Total Billable Time: 30 minutes (P, 1MT, 1TE)    Precautions:  Standard      Subjective     Pt reports:she has been inconsistent with performing strengthening HEP due to limited time during the work week  she was compliant with home exercise program, including R SF LMB PIP Extension slplint  Response to previous treatment: able to lift moderate weighted objects and GMC improved  Functional change: patient able to use R hand in light ADL/IADL's without pain    Pain:  1-2/10  Location: right SF    Objective     Sincere received the following supervised modalities after being cleared for contraindications, 10 minutes, as follows:  -Paraffin w/ MHP to B wrist/hand, pre-tx to decrease pain & increase tissue extensibility    Sincere eceived the following manual therapy techniques for 10 minutes:   -Edema mgmt w/ lymphatic drainage technique;  Extensive deep STM, including MFR, CFM & scar mgmt to R elbow/forearm/wrist/hand, using hand techniques and IASTM tools to increase blood flow/circulation, improve soft tissue pliability and decrease pain.    Sincere received therapeutic exercises for 15 minutes including:Wrist 3 ways over  wedge 2/10 reps, 3# wt,    Smiles & frowns 2/10 reps, Green T-bar, B hands   Wrist Revs-2 ways 2/10 reps each way, green T-bar   Elbow 3 ways-full arc motion 10 reps each, 4# wt,        T-putty Yellow/green   -grasp 3 reps 5 sec hold, L only   -dowel digs 2 min each hand       Home Exercises and Education Provided     Education provided:   - Reviewed HEP   - Progress towards goals     Written Home Exercises Provided: Patient instructed to cont prior HEP.  Exercises were reviewed and Sincere was able to demonstrate them prior to the end of the session.  Sincere demonstrated good  understanding of the HEP provided.     See EMR under Patient Instructions for exercises provided 05/26/20.      Assessment     Patient continues to perform all above listed therapeutic exercises with minimal fatigue in BUE's, however, strengthening has been slow due to extent of nerve damage and patient's limitations in daily availability to exercise.  Recommend to continue with progressive strengthening.     Sincere is progressing well towards her goals and there are no updates to goals at this time. Pt prognosis is Good.     Pt will continue to benefit from skilled outpatient occupational therapy to address the deficits listed in the problem list on initial evaluation provide pt/family education and to maximize pt's level of independence in the home and community environment.     Anticipated barriers to occupational therapy:  neuropathy    Pt's spiritual, cultural and educational needs considered and pt agreeable to plan of care and goals.    Goals:   Short Term Goals: (in 2 weeks)  1) Patient will be independent in HEP-met  2) Decrease pain in R elbow/wrist/hand to no more than 3-4/10 worst in ADL/IADL's-met  3) Increase AROM in R wrist ext by 3-5 degrees for improved functioning in ADL/IADL's-met  4) Increase B  strength by 3-5 psi for increased functional use-met/ongoing  5) Decrease edema in R elbow/wrist by .2 cm-met     Long Term Goals:  (in 6 weeks)  1) Decrease pain in RUE to no more than 1-2/10 worst in ADL/IADL's-part met/ongoing  2) Increase AROM of B elbow/wrist/hand to WFL for increased functioning in ADL/IADL's-met  3) Increase strength in B  & pinch by 25% of initial measures for improved functioning in ADL/IADL's-ongoing  4) Increased functioning in ADL/IADL's, as evidenced by a FOTO impairment rating of no more than 25%-ongoing  5) Decrease edema in R elbow/wrist to trace or none-met    Plan     Updated Certification Period: 07/14/20 to 09/07/20     Recommended Treatment Plan: 2 times per week for 6 weeks: Manual Therapy, Moist Heat/ Ice, Neuromuscular Re-ed, Paraffin, Patient Education, Self Care, Therapeutic Activites, Therapeutic Exercise and Ultrasound      Other Recommendations: progress strengthening further        Joseph Carlin, OT

## 2020-08-24 ENCOUNTER — CLINICAL SUPPORT (OUTPATIENT)
Dept: REHABILITATION | Facility: HOSPITAL | Age: 60
End: 2020-08-24
Payer: COMMERCIAL

## 2020-08-24 DIAGNOSIS — M25.521 PAIN IN RIGHT ELBOW: ICD-10-CM

## 2020-08-24 DIAGNOSIS — M25.531 PAIN IN RIGHT WRIST: ICD-10-CM

## 2020-08-24 DIAGNOSIS — M79.602 LEFT ARM PAIN: ICD-10-CM

## 2020-08-24 DIAGNOSIS — G60.9 CHRONIC IDIOPATHIC AXONAL POLYNEUROPATHY: ICD-10-CM

## 2020-08-24 DIAGNOSIS — M62.81 MUSCLE WEAKNESS: ICD-10-CM

## 2020-08-24 DIAGNOSIS — M25.632 STIFFNESS OF LEFT WRIST JOINT: ICD-10-CM

## 2020-08-24 PROCEDURE — 97140 MANUAL THERAPY 1/> REGIONS: CPT | Mod: PO

## 2020-08-24 PROCEDURE — 97018 PARAFFIN BATH THERAPY: CPT | Mod: PO,59

## 2020-08-24 PROCEDURE — 97110 THERAPEUTIC EXERCISES: CPT | Mod: PO

## 2020-08-24 NOTE — PROGRESS NOTES
"  Occupational Therapy Treatment Note     Date: 8/24/2020  Name: Sincere Riggins  Clinic Number: [id number]    Therapy Diagnosis:   Encounter Diagnoses   Name Primary?    Pain in right elbow     Pain in right wrist     Muscle weakness     Left arm pain     Stiffness of left wrist joint     Chronic idiopathic axonal polyneuropathy      Physician: Vadim Harp Jr*    Physician Orders: Evaluate & treat  Medical Diagnosis: L CTR & CuTR and R CTR & CuTR  Surgical Procedure and Date: L CTR & CuTR, 06/07/2019; R CTR & CuTR, 03/02/20  Evaluation Date: 04/28/20  Insurance Authorization Period Expiration: 12/31/2020  Plan of Care Certification Period: 04/28/20 to 06/09/20  Date of Return to MD: TBD      Visit # / Visits authorized:  6 / 20 (from referral 07/07/20)    Time In: 4:00 pm    Time Out: 4:45 pm  Total Billable Time: 45 minutes (P, 1MT, 1TE)    Precautions:  Standard      Subjective     Pt reports: "I did a lot of house work (cleaning) over the weekend and now I'm paying for it"  she was compliant with home exercise program, including R SF LMB PIP Extension slplint  Response to previous treatment: able to lift moderate weighted objects and GMC improved  Functional change: patient able to use R hand in light ADL/IADL's without pain    Pain:  3-4/10  Location: right SF    Objective     Sincere received the following supervised modalities after being cleared for contraindications, 10 minutes, as follows:  -Paraffin w/ MHP to B wrist/hand, pre-tx to decrease pain & increase tissue extensibility    Sincere eceived the following manual therapy techniques for 10 minutes:   -Edema mgmt w/ lymphatic drainage technique;  Extensive deep STM, including MFR, CFM & scar mgmt to R elbow/forearm/wrist/hand, using hand techniques and IASTM tools to increase blood flow/circulation, improve soft tissue pliability and decrease pain.    Sincere received therapeutic exercises for 15 minutes including:Wrist 3 ways over wedge 2/10 reps, " 3# wt,    Smiles & frowns 2/10 reps, Green T-bar, B hands   Wrist Revs-2 ways 2/10 reps each way, green T-bar   PHG 3/10 reps, setting 3 B hands   Elbow 3 ways-full arc motion 10 reps each, 4# wt,        T-putty Yellow/green   -grasp 3 reps 5 sec hold, L only   -log rolls with tripod pinch 1 log each   -bottle cap turning-CW/CCW 5 times each way, B hands   -dowel digs 2 min each hand       Home Exercises and Education Provided     Education provided:   - Reviewed HEP   - Progress towards goals     Written Home Exercises Provided: Patient instructed to cont prior HEP.  Exercises were reviewed and Sincere was able to demonstrate them prior to the end of the session.  Sincere demonstrated good  understanding of the HEP provided.     See EMR under Patient Instructions for exercises provided 05/26/20.      Assessment     Patient presents with muscle soreness and myofascial tightness in B elbow/forearm muscles today, possibly due to over use over the weekend.  She was able to perform all above listed therapeutic exercises with minimal fatigue and no increase in pain today.  Recommend to continue with progressive strengthening.     Sincere is progressing well towards her goals and there are no updates to goals at this time. Pt prognosis is Good.     Pt will continue to benefit from skilled outpatient occupational therapy to address the deficits listed in the problem list on initial evaluation provide pt/family education and to maximize pt's level of independence in the home and community environment.     Anticipated barriers to occupational therapy:  neuropathy    Pt's spiritual, cultural and educational needs considered and pt agreeable to plan of care and goals.    Goals:   Short Term Goals: (in 2 weeks)  1) Patient will be independent in HEP-met  2) Decrease pain in R elbow/wrist/hand to no more than 3-4/10 worst in ADL/IADL's-met  3) Increase AROM in R wrist ext by 3-5 degrees for improved functioning in ADL/IADL's-met  4)  Increase B  strength by 3-5 psi for increased functional use-met/ongoing  5) Decrease edema in R elbow/wrist by .2 cm-met     Long Term Goals: (in 6 weeks)  1) Decrease pain in RUE to no more than 1-2/10 worst in ADL/IADL's-part met/ongoing  2) Increase AROM of B elbow/wrist/hand to WFL for increased functioning in ADL/IADL's-met  3) Increase strength in B  & pinch by 25% of initial measures for improved functioning in ADL/IADL's-ongoing  4) Increased functioning in ADL/IADL's, as evidenced by a FOTO impairment rating of no more than 25%-ongoing  5) Decrease edema in R elbow/wrist to trace or none-met    Plan     Updated Certification Period: 07/14/20 to 09/07/20     Recommended Treatment Plan: 2 times per week for 6 weeks: Manual Therapy, Moist Heat/ Ice, Neuromuscular Re-ed, Paraffin, Patient Education, Self Care, Therapeutic Activites, Therapeutic Exercise and Ultrasound      Other Recommendations: progress strengthening further        Joseph Carlin, OT

## 2020-08-31 ENCOUNTER — CLINICAL SUPPORT (OUTPATIENT)
Dept: REHABILITATION | Facility: HOSPITAL | Age: 60
End: 2020-08-31
Payer: COMMERCIAL

## 2020-08-31 DIAGNOSIS — M62.81 MUSCLE WEAKNESS: ICD-10-CM

## 2020-08-31 DIAGNOSIS — M25.531 PAIN IN RIGHT WRIST: ICD-10-CM

## 2020-08-31 DIAGNOSIS — M25.521 PAIN IN RIGHT ELBOW: ICD-10-CM

## 2020-08-31 PROCEDURE — 97140 MANUAL THERAPY 1/> REGIONS: CPT | Mod: PO

## 2020-08-31 PROCEDURE — 97018 PARAFFIN BATH THERAPY: CPT | Mod: PO

## 2020-08-31 PROCEDURE — 97035 APP MDLTY 1+ULTRASOUND EA 15: CPT | Mod: PO

## 2020-08-31 PROCEDURE — 97110 THERAPEUTIC EXERCISES: CPT | Mod: PO

## 2020-08-31 NOTE — PROGRESS NOTES
"  Occupational Therapy Treatment Note     Date: 8/31/2020  Name: Sincere Riggins  Clinic Number: [id number]    Therapy Diagnosis:   Encounter Diagnoses   Name Primary?    Pain in right elbow     Pain in right wrist     Muscle weakness      Physician: Vadim Harp Jr*    Physician Orders: Evaluate & treat  Medical Diagnosis: L CTR & CuTR and R CTR & CuTR  Surgical Procedure and Date: L CTR & CuTR, 06/07/2019; R CTR & CuTR, 03/02/20  Evaluation Date: 04/28/20  Insurance Authorization Period Expiration: 12/31/2020  Plan of Care Certification Period: 04/28/20 to 06/09/20  Date of Return to MD: TBD      Visit # / Visits authorized:  7 / 20 (from referral 07/07/20)    Time In: 4:05 pm    Time Out: 4:45 pm  Total Billable Time: 40 minutes (P, 1US, 1MT, 1TE)    Precautions:  Standard      Subjective     Pt reports: "I'm not having a lot of pain. It's just really tender at the base of my hand"  she was compliant with home exercise program, including R SF LMB PIP Extension slplint  Response to previous treatment: able to lift moderate weighted objects and GMC improved  Functional change: patient able to use R hand in light ADL/IADL's without pain    Pain:  1-2/10  Location: right wrist/hand/SF    Objective     Sincere received the following supervised modalities after being cleared for contraindications, 10 minutes, as follows:  (simultaneoulsy performed with ultrasound to L wrist/hand)  -Paraffin w/ MHP to L wrist/hand, pre-tx to decrease pain & increase tissue extensibility    Sincere received the following direct contact modalities after being cleared for contraindications for 10 minutes: (simultaneoulsy performed with paraffin to L wrist/hand)  -3.3 MHz, 1.0 W/cm2, continuous, to R wrist/hand, to decrease pain & edema, increase circulation and tissue extensibility    Sincere eceived the following manual therapy techniques for 15 minutes:   -Edema mgmt w/ lymphatic drainage technique;  Extensive deep STM, including MFR, CFM " & scar mgmt to R elbow/forearm/wrist/hand, using hand techniques and IASTM tools to increase blood flow/circulation, improve soft tissue pliability and decrease pain.    Sincere received therapeutic exercises for 15 minutes including:Wrist 3 ways over wedge 2/10 reps, 3# wt,    Smiles & frowns 2/10 reps, Green T-bar, B hands   Wrist Revs-2 ways 2/10 reps each way, green T-bar   PHG 3/10 reps, setting 3 B hands   Elbow 3 ways-full arc motion L-3/10 reps each, 4# wt, R=2/10 reps         T-putty Yellow/green   -grasp 3 reps 5 sec hold, L only   -log rolls with tripod pinch 1 log each   -bottle cap turning-CW/CCW 5 times each way, B hands   -dowel digs 2 min each hand       Home Exercises and Education Provided     Education provided:   - Reviewed HEP   - Progress towards goals     Written Home Exercises Provided: Patient instructed to cont prior HEP.  Exercises were reviewed and Sincere was able to demonstrate them prior to the end of the session.  Sincere demonstrated good  understanding of the HEP provided.     See EMR under Patient Instructions for exercises provided 05/26/20.      Assessment     Patient noted to have a small area of tenderness that presents with fibrotic scar tissue in her R hypothenar eminence.  Myofascial tightness in B elbow/forearm muscles has improved slince last treatment. She was able to perform all above listed therapeutic exercises with minimal fatigue and no increase in pain today.  Recommend to continue with progressive strengthening.     Sincere is progressing well towards her goals and there are no updates to goals at this time. Pt prognosis is Good.     Pt will continue to benefit from skilled outpatient occupational therapy to address the deficits listed in the problem list on initial evaluation provide pt/family education and to maximize pt's level of independence in the home and community environment.     Anticipated barriers to occupational therapy:  neuropathy    Pt's spiritual, cultural and  educational needs considered and pt agreeable to plan of care and goals.    Goals:   Short Term Goals: (in 2 weeks)  1) Patient will be independent in HEP-met  2) Decrease pain in R elbow/wrist/hand to no more than 3-4/10 worst in ADL/IADL's-met  3) Increase AROM in R wrist ext by 3-5 degrees for improved functioning in ADL/IADL's-met  4) Increase B  strength by 3-5 psi for increased functional use-met/ongoing  5) Decrease edema in R elbow/wrist by .2 cm-met     Long Term Goals: (in 6 weeks)  1) Decrease pain in RUE to no more than 1-2/10 worst in ADL/IADL's-part met/ongoing  2) Increase AROM of B elbow/wrist/hand to WFL for increased functioning in ADL/IADL's-met  3) Increase strength in B  & pinch by 25% of initial measures for improved functioning in ADL/IADL's-ongoing  4) Increased functioning in ADL/IADL's, as evidenced by a FOTO impairment rating of no more than 25%-ongoing  5) Decrease edema in R elbow/wrist to trace or none-met    Plan     Updated Certification Period: 07/14/20 to 09/07/20     Recommended Treatment Plan: 2 times per week for 6 weeks: Manual Therapy, Moist Heat/ Ice, Neuromuscular Re-ed, Paraffin, Patient Education, Self Care, Therapeutic Activites, Therapeutic Exercise and Ultrasound      Other Recommendations: progress strengthening further        Joseph Carlin, OT

## 2020-09-09 ENCOUNTER — CLINICAL SUPPORT (OUTPATIENT)
Dept: REHABILITATION | Facility: HOSPITAL | Age: 60
End: 2020-09-09
Payer: COMMERCIAL

## 2020-09-09 DIAGNOSIS — M25.521 PAIN IN RIGHT ELBOW: ICD-10-CM

## 2020-09-09 DIAGNOSIS — M62.81 MUSCLE WEAKNESS: ICD-10-CM

## 2020-09-09 DIAGNOSIS — M25.531 PAIN IN RIGHT WRIST: ICD-10-CM

## 2020-09-09 PROCEDURE — 97035 APP MDLTY 1+ULTRASOUND EA 15: CPT | Mod: PO

## 2020-09-09 PROCEDURE — 97110 THERAPEUTIC EXERCISES: CPT | Mod: PO

## 2020-09-09 PROCEDURE — 97018 PARAFFIN BATH THERAPY: CPT | Mod: PO

## 2020-09-09 NOTE — PROGRESS NOTES
"  Occupational Therapy Treatment Note     Date: 9/9/2020  Name: Sincere Riggins  Clinic Number: [id number]    Therapy Diagnosis:   Encounter Diagnoses   Name Primary?    Pain in right elbow     Pain in right wrist     Muscle weakness      Physician: Vadim Harp Jr*    Physician Orders: Evaluate & treat  Medical Diagnosis: L CTR & CuTR and R CTR & CuTR  Surgical Procedure and Date: L CTR & CuTR, 06/07/2019; R CTR & CuTR, 03/02/20  Evaluation Date: 04/28/20  Insurance Authorization Period Expiration: 12/31/2020  Plan of Care Certification Period: 04/28/20 to 06/09/20  Date of Return to MD: TBD      Visit # / Visits authorized:  8 / 20 (from referral 07/07/20) Re-assess and Update Plan of Care next visit      Time In: 4:10 pm    Time Out: 4:45 pm  Total Billable Time: 35 minutes (P, 1US, 1TE)    Precautions:  Standard      Subjective     Pt reports: "I've been using my hand weights at home and my arms seem to be getting stronger"  she was compliant with home exercise program, including R SF LMB PIP Extension slplint  Response to previous treatment: able to lift moderate weighted objects and GMC improved  Functional change: patient able to use R hand in light ADL/IADL's without pain    Pain:  1-2/10  Location: right wrist/hand/SF    Objective     Sincere received the following supervised modalities after being cleared for contraindications, simultaneoulsy performed with ultrasound to L wrist/hand  -Paraffin w/ MHP to L wrist/hand, pre-tx to decrease pain & increase tissue extensibility    Sincere received the following direct contact modalities after being cleared for contraindications for 10 minutes: (simultaneoulsy performed with paraffin to L wrist/hand)  -3.3 MHz, 1.0 W/cm2, continuous, to R wrist/hand, to decrease pain & edema, increase circulation and tissue extensibility    Sincere eceived the following manual therapy techniques for 7 minutes:   -Edema mgmt w/ lymphatic drainage technique;  Extensive deep STM, " including MFR, CFM & scar mgmt to R elbow/forearm/wrist/hand, using hand techniques and IASTM tools to increase blood flow/circulation, improve soft tissue pliability and decrease pain.    Sincere received therapeutic exercises for 18 minutes including:Wrist 3 ways over wedge 2/10 reps, 3# wt,    Smiles & frowns 2/10 reps, Green T-bar, B hands   Wrist Revs-2 ways 2/10 reps each way, green T-bar   PHG 3/10 reps, setting 3 B hands   Elbow 3 ways-full arc motion L-3/10 reps each, 4# wt, R=2/10 reps       T-putty Red   -grasp 3 reps 5 sec hold, L only   -log rolls with tripod pinch 1 log each   -bottle cap turning-CW/CCW 5 times each way, B hands   -dowel digs 2 min each hand       Home Exercises and Education Provided     Education provided:   - Reviewed HEP   - Progress towards goals     Written Home Exercises Provided: Patient instructed to cont prior HEP.  Exercises were reviewed and Sincere was able to demonstrate them prior to the end of the session.  Sincere demonstrated good  understanding of the HEP provided.     See EMR under Patient Instructions for exercises provided 05/26/20.      Assessment     Patient without TTP over area of previous tenderness in R hand/palm.  Scar tissue in her R hypothenar eminence is decreasing with each OT treatment.  Myofascial tightness in B elbow/forearm muscles continues to be better, as well.  She was able to perform all above listed therapeutic exercises with minimal fatigue and no increase in pain today.  Recommend to continue with progressive strengthening.     Sincere is progressing well towards her goals and there are no updates to goals at this time. Pt prognosis is Good.     Pt will continue to benefit from skilled outpatient occupational therapy to address the deficits listed in the problem list on initial evaluation provide pt/family education and to maximize pt's level of independence in the home and community environment.     Anticipated barriers to occupational therapy:   neuropathy    Pt's spiritual, cultural and educational needs considered and pt agreeable to plan of care and goals.    Goals:   Short Term Goals: (in 2 weeks)  1) Patient will be independent in HEP-met  2) Decrease pain in R elbow/wrist/hand to no more than 3-4/10 worst in ADL/IADL's-met  3) Increase AROM in R wrist ext by 3-5 degrees for improved functioning in ADL/IADL's-met  4) Increase B  strength by 3-5 psi for increased functional use-met/ongoing  5) Decrease edema in R elbow/wrist by .2 cm-met     Long Term Goals: (in 6 weeks)  1) Decrease pain in RUE to no more than 1-2/10 worst in ADL/IADL's-part met/ongoing  2) Increase AROM of B elbow/wrist/hand to WFL for increased functioning in ADL/IADL's-met  3) Increase strength in B  & pinch by 25% of initial measures for improved functioning in ADL/IADL's-ongoing  4) Increased functioning in ADL/IADL's, as evidenced by a FOTO impairment rating of no more than 25%-ongoing  5) Decrease edema in R elbow/wrist to trace or none-met    Plan     Updated Certification Period: 07/14/20 to 09/07/20     Recommended Treatment Plan: 2 times per week for 6 weeks: Manual Therapy, Moist Heat/ Ice, Neuromuscular Re-ed, Paraffin, Patient Education, Self Care, Therapeutic Activites, Therapeutic Exercise and Ultrasound      Other Recommendations: progress strengthening further        Joseph Carlin, OT

## 2020-09-14 ENCOUNTER — CLINICAL SUPPORT (OUTPATIENT)
Dept: REHABILITATION | Facility: HOSPITAL | Age: 60
End: 2020-09-14
Payer: COMMERCIAL

## 2020-09-14 DIAGNOSIS — M25.632 STIFFNESS OF LEFT WRIST JOINT: ICD-10-CM

## 2020-09-14 DIAGNOSIS — M25.531 PAIN IN RIGHT WRIST: ICD-10-CM

## 2020-09-14 DIAGNOSIS — M79.602 LEFT ARM PAIN: ICD-10-CM

## 2020-09-14 DIAGNOSIS — M62.81 MUSCLE WEAKNESS: ICD-10-CM

## 2020-09-14 DIAGNOSIS — G60.9 CHRONIC IDIOPATHIC AXONAL POLYNEUROPATHY: ICD-10-CM

## 2020-09-14 DIAGNOSIS — M25.521 PAIN IN RIGHT ELBOW: ICD-10-CM

## 2020-09-14 PROCEDURE — 97018 PARAFFIN BATH THERAPY: CPT | Mod: PO

## 2020-09-14 PROCEDURE — 97035 APP MDLTY 1+ULTRASOUND EA 15: CPT | Mod: PO

## 2020-09-14 PROCEDURE — 97110 THERAPEUTIC EXERCISES: CPT | Mod: PO

## 2020-09-14 PROCEDURE — 97140 MANUAL THERAPY 1/> REGIONS: CPT | Mod: PO

## 2020-09-14 NOTE — PROGRESS NOTES
Occupational Therapy Treatment Note     Date: 9/14/2020  Name: Sincere Riggins  Clinic Number: [id number]    Therapy Diagnosis:   Encounter Diagnoses   Name Primary?    Pain in right elbow     Pain in right wrist     Muscle weakness     Left arm pain     Stiffness of left wrist joint     Chronic idiopathic axonal polyneuropathy      Physician: Vadim Harp Jr*    Physician Orders: Evaluate & treat  Medical Diagnosis: L CTR & CuTR and R CTR & CuTR  Surgical Procedure and Date: L CTR & CuTR, 06/07/2019; R CTR & CuTR, 03/02/20  Evaluation Date: 04/28/20  Insurance Authorization Period Expiration: 12/31/2020  Plan of Care Certification Period: 04/28/20 to 06/09/20  Date of Return to MD: 09/15/20      Visit # / Visits authorized:  9 / 20 (from referral 07/07/20)     Time In: 4:00 pm    Time Out: 4:45 pm  Total Billable Time: 45 minutes (P, 1US, 1MT, 1TE)    Precautions:  Standard      Subjective     Pt reports: she has not been performing hand strengthening HEP as recommended. She reports she is scheduled to see referring MD on 09/15/20  she was compliant with home exercise program, including R SF LMB PIP Extension slplint  Response to previous treatment: able to lift moderate weighted objects and GMC improved  Functional change: patient able to use R hand in light ADL/IADL's without pain    Pain:  1-2/10  Location: right wrist/hand/SF    Objective     Sincere received the following supervised modalities after being cleared for contraindications, simultaneoulsy performed with ultrasound to L wrist/hand  -Paraffin w/ MHP to L wrist/hand, pre-tx to decrease pain & increase tissue extensibility    Sincere received the following direct contact modalities after being cleared for contraindications for 10 minutes: (simultaneoulsy performed with paraffin to L wrist/hand)  -3.3 MHz, 1.0 W/cm2, continuous, to R wrist/hand, to decrease pain & edema, increase circulation and tissue extensibility    Sincere eceived the following  manual therapy techniques for 10 minutes:   -Edema mgmt w/ lymphatic drainage technique;  Extensive deep STM, including MFR, CFM & scar mgmt to R elbow/forearm/wrist/hand, using hand techniques and IASTM tools to increase blood flow/circulation, improve soft tissue pliability and decrease pain.    Sincere received therapeutic exercises for 25 minutes including:    Objective measures taken today, see Updated Plan of Care for details    Home Exercises and Education Provided     Education provided:   - Reviewed HEP   - Progress towards goals     Written Home Exercises Provided: Patient instructed to cont prior HEP.  Exercises were reviewed and Sincere was able to demonstrate them prior to the end of the session.  Sincere demonstrated good  understanding of the HEP provided.     See EMR under Patient Instructions for exercises provided 05/26/20.      Assessment     See Updated Plan of Care for details on progress and status of goals          Anticipated barriers to occupational therapy:  neuropathy    Pt's spiritual, cultural and educational needs considered and pt agreeable to plan of care and goals.      Plan     See Updated Plan of Care for details and recommendations for treatment plan.        Joseph Carlin, OT

## 2020-09-14 NOTE — PLAN OF CARE
Outpatient Therapy Updated Plan of Care     Visit Date: 9/14/2020  Name: Sincere Riggins  Clinic Number: [id number]    Therapy Diagnosis:   Encounter Diagnoses   Name Primary?    Pain in right elbow     Pain in right wrist     Muscle weakness     Left arm pain     Stiffness of left wrist joint     Chronic idiopathic axonal polyneuropathy      Physician: Vadim Harp Jr*    Physician Orders: Evaluate & treat  Medical Diagnosis: L CTR & CuTR and R CTR & CuTR  Surgical Procedure and Date: L CTR & CuTR, 06/07/2019; R CTR & CuTR, 03/02/20  Evaluation Date: 04/28/20    Total Visits Received: L=63; R=28  Cancelled Visits: 3  No Show Visits: 0    Current Certification Period: 07/14/20 to 09/07/20  Precautions:  Standard  Visits from Evaluation Date:  21  Functional Level Prior to Evaluation:  Independent    Subjective     Patient does admit she has not been performing strengthening HEP everyday as recommended.     Pain:  Functional Pain Scale Rating 0-10:   1/10 on average  0/10 at best  3/10 at worst  Location: R wrist/hand  Description: Aching at times, heavy,  Tingling, Numbness   Aggravating Factors: Night Time and with excess use of R hand, typing or writing  Easing Factors: OTC pain medication, ice, rest and elevation    Objective     Update: Sacr tissue is minimally dense at R wrist/hand and trace at R elbow, without adhesions to underlying soft tissue.  Patient continues to present with moderate intrinsic muscle atrophy in her L hand and minimal in her R hand.       Edema. Measured in centimeters.   04/29/20 04/29/30 06/4/20 07/28/20 09/14/20    Left Right Right Right Right   Elbow crease 23.0 24.0 24.0 (=) 24.1 (=) 23.5 (-.6)   2in. Below elbow 22.4 23.5 23.1 (-.4) 23.0 (-.1) 23.7 (+.7)   Wrist Crease 14.5 14.5 14.7 (+.2) 14.7 (=) 15.2 (+.5)   Mid palm 17.0 18.5 18.0 (-.5) 18.0 (=) 18.5 (+.5)   MCPs 18.2 18.5 18.7 (+.2) 18.7 (=) 18.7 (=)      04/29/20 04/29/20 06/4/20 07/28/20 09/14/20    Left Right  "Right Right Right   Elbow Ext/Flex 0/150 0/145 0/150 (+5) 0/152 (+2) 152 (=)   Forearm Sup/pron WNL WNL WNL WNL WNL   Wrist E/F 65/70 60/70 65 (+5) / 70 (=) 65/70 (=) 67 (+2) /70 (=)   Wrist RD/UD 24/35 20/35 20 (=) / 40 (+5) 20/40 (=) 20 /40 (=)   Thumb R/P Abd 45/50 45/47 55 (+10) / 55 (+8) 55/55 (=) 55 (=) /52 (-3)   Thumb MP flex 61 64 69 (+5) 69 (=) 70 (+1)   Thumb IP flex 80 65 69 (+5) 69 (=) 67 (-2)   Thumb Fall Branch To SF DPC To SF DPC To SF DPC (=) To SF DPC To SF DPC     Hand ROM:  Full composite fist WNL, [place]    Sensation: Patient continues to report numbness and tingling over the ulnar aspect of the R /hand/SF; she is also reporting a "heaviness" sensation in the R ulnar aspect hand/SF; Light touch and temp are intact in BUE's     Strength (Dyanmometer) and Pinch Strength (Pinch Gauge)  Measured in pounds and psi.    04/29/20 04/29/20 06/4/20 06/4/20 07/28/20 07/28/20 09/14/20 09/14/20    Left Right Left Right Left Right Left Right   Rung II 31.5 40 35 (+3.5) 43 (+3) 35 (=) 38 (-5) 33 (-2) 37 (-1)   Key Pinch 4 6 4 (=) 6 (=) 6 (+2) 7 (+1) 6 (=) 7 (=)   3pt Pinch 2.5 4.5 3 (+.5) 6 (+1.5) 4 (+1) 7 (+1) 4 (=) 6 (-1)   2pt Pinch 3 3 2 (-1) 2 (-1) 4 (+2) 5 (+3) 4 (=) 5 (=)       Assessment     Update: Patient present with minimal changes in measures compared to last re-assessment.   Patient's progress may have reached a plateau, however, she did admit to not consistently performing strengthening HEP.  She would benefit from continued OT to address strength deficits to maximize functioning to highest level in ADL/IADL's.     Goals:   Short Term Goals: (in 2 weeks)  1) Patient will be independent in HEP-met  2) Decrease pain in R elbow/wrist/hand to no more than 3-4/10 worst in ADL/IADL's-met  3) Increase AROM in R wrist ext by 3-5 degrees for improved functioning in ADL/IADL's-met  4) Increase B  strength by 3-5 psi for increased functional use-met/ongoing  5) Decrease edema in R elbow/wrist by .2 " cm-met    Long Term Goals: (in 6 weeks)  1) Decrease pain in RUE to no more than 1-2/10 worst in ADL/IADL's-part met/ongoing  2) Increase AROM of B elbow/wrist/hand to WFL for increased functioning in ADL/IADL's-met  3) Increase strength in B  & pinch by 25% of initial measures for improved functioning in ADL/IADL's-ongoing  4) Increased functioning in ADL/IADL's, as evidenced by a FOTO impairment rating of no more than 25%-ongoing  5) Decrease edema in R elbow/wrist to trace or none-met    Previous Short Term Goals Status:   5/5 STG's met  New Short Term Goals Status:   Progress to LTG's  Long Term Goal Status:   continue per initial plan of care.  Reasons for Recertification of Therapy:   Updated Plan of Care needed    Plan     Updated Certification Period:  09/07/20 to 10/06/20    Recommended Treatment Plan: 2 times per week for 4 weeks: Manual Therapy, Moist Heat/ Ice, Neuromuscular Re-ed, Paraffin, Patient Education, Self Care, Therapeutic Activites, Therapeutic Exercise and Ultrasound     Other Recommendations: progress strengthening further    Joseph Carlin OT  9/14/2020      I CERTIFY THE NEED FOR THESE SERVICES FURNISHED UNDER THIS PLAN OF TREATMENT AND WHILE UNDER MY CARE    Physician's comments:        Physician's Signature: ___________________________________________________

## 2020-09-16 ENCOUNTER — OFFICE VISIT (OUTPATIENT)
Dept: ORTHOPEDICS | Facility: CLINIC | Age: 60
End: 2020-09-16
Payer: COMMERCIAL

## 2020-09-16 VITALS
WEIGHT: 128 LBS | HEART RATE: 70 BPM | SYSTOLIC BLOOD PRESSURE: 117 MMHG | HEIGHT: 64 IN | DIASTOLIC BLOOD PRESSURE: 76 MMHG | BODY MASS INDEX: 21.85 KG/M2

## 2020-09-16 DIAGNOSIS — R29.898 LEFT HAND WEAKNESS: Primary | ICD-10-CM

## 2020-09-16 DIAGNOSIS — G56.01 RIGHT CARPAL TUNNEL SYNDROME: ICD-10-CM

## 2020-09-16 DIAGNOSIS — G62.9 POLYNEUROPATHY: ICD-10-CM

## 2020-09-16 PROCEDURE — 3008F BODY MASS INDEX DOCD: CPT | Mod: CPTII,S$GLB,, | Performed by: PLASTIC SURGERY

## 2020-09-16 PROCEDURE — 99999 PR PBB SHADOW E&M-EST. PATIENT-LVL III: CPT | Mod: PBBFAC,,, | Performed by: PLASTIC SURGERY

## 2020-09-16 PROCEDURE — 99213 OFFICE O/P EST LOW 20 MIN: CPT | Mod: S$GLB,,, | Performed by: PLASTIC SURGERY

## 2020-09-16 PROCEDURE — 3008F PR BODY MASS INDEX (BMI) DOCUMENTED: ICD-10-PCS | Mod: CPTII,S$GLB,, | Performed by: PLASTIC SURGERY

## 2020-09-16 PROCEDURE — 99213 PR OFFICE/OUTPT VISIT, EST, LEVL III, 20-29 MIN: ICD-10-PCS | Mod: S$GLB,,, | Performed by: PLASTIC SURGERY

## 2020-09-16 PROCEDURE — 99999 PR PBB SHADOW E&M-EST. PATIENT-LVL III: ICD-10-PCS | Mod: PBBFAC,,, | Performed by: PLASTIC SURGERY

## 2020-09-16 NOTE — PROGRESS NOTES
"Ms. Riggins turns today for re-evaluation of bilateral upper extremities after cubital tunnel releases and bilateral carpal tunnel releases.  Patient states that she finished therapy yesterday.  She has noticed significant improvement in the appearance of the left hand as well as the strength.  She continues to have weakness within the left index finger.  She also has difficulty with decreased sensation in the ulnar distribution of both upper extremities.  Otherwise she feels that she may great progress.  She has no other complaints    Physical exam:    Vitals:    09/16/20 0917   BP: 117/76   Pulse: 70   Weight: 58.1 kg (128 lb)   Height: 5' 4" (1.626 m)   PainSc:   3     Right upper extremity: Incisions are well healed, sensation intact to light touch in the ulnar distribution compared to the median distribution.  Improved clawing of the ring and small digits, intrinsics intact good opposition decreased sensation over the dorsal ulnar aspect of the hand within the dorsal cutaneous distribution.  Good capillary refill  Left upper extremity:  Improved intrinsic muscle bulk, Sensation intact in the median radial ulnar distributions.  Positive Froment's sign, weak F DI muscle    Assessment:  Status post surgery  Plan:  Sincere was seen today for numbness, pain, numbness and pain.    Diagnoses and all orders for this visit:    Left hand weakness        -she continues to improve particularly within the left hand.  She still has some weakness which may continue to improve with daily exercises.  I discussed that all we can do at this time is continue with home therapy and hope for continued improvement.  I discussed performing a repeat EMG in December to assess her improvement of both upper extremities.  The patient expressed interest in this.  I discussed that she should follow up me in February or shortly after the EMG is performed.  All questions concerns were addressed  "

## 2020-12-01 ENCOUNTER — PROCEDURE VISIT (OUTPATIENT)
Dept: PHYSICAL MEDICINE AND REHAB | Facility: CLINIC | Age: 60
End: 2020-12-01
Payer: COMMERCIAL

## 2020-12-01 DIAGNOSIS — G62.9 POLYNEUROPATHY: ICD-10-CM

## 2020-12-01 PROCEDURE — 95886 MUSC TEST DONE W/N TEST COMP: CPT | Mod: S$GLB,,, | Performed by: PHYSICAL MEDICINE & REHABILITATION

## 2020-12-01 PROCEDURE — 95886 PR EMG COMPLETE, W/ NERVE CONDUCTION STUDIES, 5+ MUSCLES: ICD-10-PCS | Mod: S$GLB,,, | Performed by: PHYSICAL MEDICINE & REHABILITATION

## 2020-12-01 PROCEDURE — 95912 PR NERVE CONDUCTION STUDY; 11 -12 STUDIES: ICD-10-PCS | Mod: S$GLB,,, | Performed by: PHYSICAL MEDICINE & REHABILITATION

## 2020-12-01 PROCEDURE — 95912 NRV CNDJ TEST 11-12 STUDIES: CPT | Mod: S$GLB,,, | Performed by: PHYSICAL MEDICINE & REHABILITATION

## 2020-12-01 NOTE — PROCEDURES
Test Date:  2020    Patient: Sincere Riggins : 1960 Physician: Fredrick Joyce D.O.   ID#:  SEX: Female Ref. Phys: Vadim Harp Jr*     HPI: Sincere Riggins is a 60 y.o.female who presents for NCS/EMG to evaluate bilateral upper extremities.  She is s/p bilateral CTR and bilateral ulnar cubital tunnel releases.      NCV & EMG Findings:   Evaluation of the left median motor and the right median motor nerves showed prolonged distal onset latency and decreased conduction velocity.   The left Ulnar (ADM) motor nerve showed prolonged distal onset latency and reduced amplitude.   The right Ulnar (ADM) motor nerve showed prolonged distal onset latency.   The right Dorsal Ulnar Cutaneous sensory nerve showed no response.   The left median sensory, the left ulnar sensory, and the right ulnar sensory nerves showed prolonged distal peak latency, reduced amplitude, and decreased conduction velocity.   The right median sensory nerve showed prolonged distal peak latency and decreased conduction velocity.   The left radial sensory nerve showed reduced amplitude.   All remaining nerves (as indicated in the following tables) were within normal limits.   Needle evaluation of the left First Dorsal Interosseous and the left Abductor Digiti Minimi muscles showed increased motor unit amplitude, increased motor unit duration, and diminished recruitment.   The right First Dorsal Interosseous muscle showed increased insertional activity, moderately increased spontaneous activity, and diminished recruitment.   The left Flexor Carpi Ulnaris muscle showed slightly increased polyphasic potentials and diminished recruitment.   The right Abductor Digiti Minimi muscle showed increased insertional activity, moderately increased spontaneous activity, increased motor unit amplitude, increased motor unit duration, and diminished recruitment.   The right Flexor Carpi Ulnaris muscle showed increased motor unit  duration.   The right Flexor Digitorum Profundus (ulnar) muscle showed increased insertional activity and increased spontaneous activity.   All remaining muscles (as indicated in the following table) showed no evidence of electrical instability.    Impression:  1. There is evidence of a bilateral sensorimotor ulnar mononeuropathy, both at the wrists and at the elbows.  On the left, there is axonal loss (was present on prior study).  All of the changes seen on the left are chronic without any active/ongoing denervation.  There are also polyphasic motor units in the left FCU which are indicative of early re-innervation.  On the right, there is active/ongoing denervation in the proximal and distal ulnar-innervated musculature, which was not present before.  This indicates interval progression on the RIGHT at the elbow (and likely at the wrist too).  When compared to the prior study from 2/12/2020, there is no longer an amplitude drop on the right in the forearm.  Also, the left ulnar motor nerve responses, though still small, are increased in size when compared to the prior study.  This indicates interval improvement on the LEFT.      2. There is electrophysiologic evidence of a bilateral sensorimotor median mononeuropathy across the wrist (I.e. Carpal tunnel syndrome).  There is no motor axonal loss.  There is no active denervation.  This is graded as Moderate in severity bilaterally.  When compared to the prior study from 2/2020, there is no longer a decrease in left median motor amplitude, suggesting interval improvement.  The latencies are mildly increased compared to the prior study.    3. On today's study, there is also slight decrease in left radial sensory amplitude which was not present prior.  In the context of her known peripheral polyneuropathy, this is suggestive of peripheral polyneuropathy affecting the upper extremities.  Some of the slowing at the wrist of the median and ulnar nerves may be  confounded by this peripheral polyneuropathy.      ___________________________  Fredrick Joyce D.O.        NCS+  Motor Nerve Results      Latency Amplitude F-Lat Segment Distance CV Comment   Site (ms) Norm (mV) Norm (ms)  (cm) (m/s) Norm    Left Median (APB)   Wrist *5.1  < 4.4 4.3  > 4.2  Wrist-Palm - - -    Elbow 10.2 - 3.1 -  Elbow-Wrist 23 *45  > 51    Right Median (APB)   Wrist *5.2  < 4.4 5.0  > 4.2  Wrist-Palm - - -    Elbow 10.0 - 4.3 -  Elbow-Wrist 23 *48  > 51    Left Ulnar (ADM)   Wrist *4.9  < 3.7 *1.22  > 3.0      +temp dispersion   Bel Elbow 7.4 - 0.54 -  Bel Elbow-Wrist 20 80  > 52    Abv Elbow 9.1 - 0.82 -  Abv Elbow-Bel Elbow 11 65  > 43    Right Ulnar (ADM)   Wrist *4.2  < 3.7 4.1  > 3.0         Bel Elbow 8.4 - 3.4 -  Bel Elbow-Wrist 23 55  > 52    Abv Elbow 11.2 - 3.3 -  Abv Elbow-Bel Elbow 14 50  > 43      Sensory Nerve Results      Latency (Peak) Amplitude (P-P) Segment Distance CV Comment   Site (ms) Norm (µV) Norm  (cm) (m/s) Norm    Left Median   Wrist-Dig II *4.5  < 4.0 *14  > 15 Wrist-Dig II 14 *31  > 39    Right Median   Wrist-Dig II *4.7  < 4.0 25  > 15 Wrist-Dig II 14 *30  > 39    Left Ulnar   Wrist-Dig V *7.7  < 4.0 *12  > 13 Wrist-Dig V 14 *18  > 38    Right Ulnar   Wrist-Dig V *6.5  < 4.0 *10  > 13 Wrist-Dig V 14 *22  > 38    Right Dorsal Ulnar Cutaneous   Wrist-Dorsum 5th MC *NR - *NR - Wrist-Dorsum 5th MC - *NR -    Left Radial   Forearm-Wrist 2.3  < 2.8 *8  > 11 Forearm-Wrist 10 43 -    Right Radial   Forearm-Wrist 2.1  < 2.8 18  > 11 Forearm-Wrist 10 48 -      EMG+     Side Muscle Nerve Root Ins Act Fibs Psw Amp Dur Poly Recrt Int Pat Comment   Left APB Median C8-T1 Nml Nml Nml Nml Nml 0 Nml Nml    Left FDI Ulnar C8-T1 Nml Nml Nml *Incr *>12ms 0 *Reduced Nml    Right APB Median C8-T1 Nml Nml Nml Nml Nml 0 Nml Nml    Right FDI Ulnar C8-T1 *Incr *2+ *2+ Nml Nml 0 *Reduced Nml +fascic   Right Pronator Teres Median C6-C7 Nml Nml Nml Nml Nml 0 Nml Nml    Left Pronator Teres  Median C6-C7 Nml Nml Nml Nml Nml 0 Nml Nml    Left ADM Ulnar C8-T1 Nml Nml Nml *Incr *>12ms 0 *Reduced Nml    Left EIP Post Interosseous,  R... C7-C8 Nml Nml Nml Nml Nml 0 Nml Nml    Left FCU Ulnar C8-T1 Nml Nml Nml Nml Nml *1+ *Reduced Nml    Left Flex dig prof (uln) Ulnar C8-T1 Nml Nml Nml Nml Nml 0 Nml Nml    Right ADM Ulnar C8-T1 *Incr *2+ *2+ *Incr *>12ms 0 *Reduced Nml    Right EIP Post Interosseous,  R... C7-C8 Nml Nml Nml Nml Nml 0 Nml Nml    Right FCU Ulnar C8-T1 Nml Nml Nml Nml *>12ms 0 Nml Nml serrations   Right Flex dig prof (uln) Ulnar C8-T1 *Incr *3+ *3+ Nml Nml 0 Nml Nml            Waveforms:    Motor              Sensory

## 2020-12-23 ENCOUNTER — OFFICE VISIT (OUTPATIENT)
Dept: ORTHOPEDICS | Facility: CLINIC | Age: 60
End: 2020-12-23
Payer: COMMERCIAL

## 2020-12-23 VITALS
SYSTOLIC BLOOD PRESSURE: 117 MMHG | HEIGHT: 64 IN | WEIGHT: 128 LBS | DIASTOLIC BLOOD PRESSURE: 77 MMHG | BODY MASS INDEX: 21.85 KG/M2 | HEART RATE: 80 BPM

## 2020-12-23 DIAGNOSIS — G56.21 CUBITAL TUNNEL SYNDROME ON RIGHT: ICD-10-CM

## 2020-12-23 DIAGNOSIS — R29.898 WEAKNESS OF RIGHT HAND: Primary | ICD-10-CM

## 2020-12-23 DIAGNOSIS — G56.21 ULNAR NERVE COMPRESSION AT MULTIPLE LEVELS, RIGHT: ICD-10-CM

## 2020-12-23 PROCEDURE — 3008F BODY MASS INDEX DOCD: CPT | Mod: CPTII,S$GLB,, | Performed by: PLASTIC SURGERY

## 2020-12-23 PROCEDURE — 99999 PR PBB SHADOW E&M-EST. PATIENT-LVL III: ICD-10-PCS | Mod: PBBFAC,,, | Performed by: PLASTIC SURGERY

## 2020-12-23 PROCEDURE — 99213 OFFICE O/P EST LOW 20 MIN: CPT | Mod: S$GLB,,, | Performed by: PLASTIC SURGERY

## 2020-12-23 PROCEDURE — 1125F PR PAIN SEVERITY QUANTIFIED, PAIN PRESENT: ICD-10-PCS | Mod: S$GLB,,, | Performed by: PLASTIC SURGERY

## 2020-12-23 PROCEDURE — 1125F AMNT PAIN NOTED PAIN PRSNT: CPT | Mod: S$GLB,,, | Performed by: PLASTIC SURGERY

## 2020-12-23 PROCEDURE — 3008F PR BODY MASS INDEX (BMI) DOCUMENTED: ICD-10-PCS | Mod: CPTII,S$GLB,, | Performed by: PLASTIC SURGERY

## 2020-12-23 PROCEDURE — 99999 PR PBB SHADOW E&M-EST. PATIENT-LVL III: CPT | Mod: PBBFAC,,, | Performed by: PLASTIC SURGERY

## 2020-12-23 PROCEDURE — 99213 PR OFFICE/OUTPT VISIT, EST, LEVL III, 20-29 MIN: ICD-10-PCS | Mod: S$GLB,,, | Performed by: PLASTIC SURGERY

## 2020-12-23 NOTE — PROGRESS NOTES
Subjective:      Patient ID: Sincere Riggins is a 60 y.o. female.    Chief Complaint: Pain of the Left Wrist    Sincere Riggins returns today discuss her recent EMG results.  She feels that her left hand has improved however right hand continues to get worse.  She complains of near constant numbness and heavy feeling within the small digit.  She also experiences some occasional weakness.  She does complain of a different type of pain in the right hand when compared to the left.  She denies any other complain.      Review of Systems   All other systems reviewed and are negative.        Objective:            General    Vitals reviewed.  Constitutional: She is oriented to person, place, and time. She appears well-nourished. No distress.   Pulmonary/Chest: Effort normal.   Neurological: She is alert and oriented to person, place, and time.   Psychiatric: She has a normal mood and affect.             Right Hand/Wrist Exam     Tests     Atrophy   Hypothenar:  negative  Intrinsic:  negative  1st Dorsal Interosseous: negative    Other     Neuorologic Exam    Median Distribution: normal  Radial Distribution: abnormal    Comments:  There is some intrinsic weakness within the hand within the ulnar distribution is a mild Tinel's over the ulnar nerve extending to Guyon's canal.      Right Elbow Exam     Range of Motion   The patient has normal right elbow ROM.    Comments:  There is a mild Tinel's over the ulnar nerve anterior to the medial epicondyle and extending into the forearm          Vascular Exam       Capillary Refill  Right Hand: normal capillary refill    EMG impression:Impression:   1. There is evidence of a bilateral sensorimotor ulnar   mononeuropathy, both at the wrists and at the elbows.  On the   left, there is axonal loss (was present on prior study).  All of   the changes seen on the left are chronic without any   active/ongoing denervation.  There are also polyphasic motor   units in the left FCU which are  indicative of early   re-innervation.  On the right, there is active/ongoing   denervation in the proximal and distal ulnar-innervated   musculature, which was not present before.  This indicates   interval progression on the RIGHT at the elbow (and likely at the   wrist too).  When compared to the prior study from 2/12/2020,   there is no longer an amplitude drop on the right in the forearm.    Also, the left ulnar motor nerve responses, though still small,   are increased in size when compared to the prior study.  This   indicates interval improvement on the LEFT.       2. There is electrophysiologic evidence of a bilateral   sensorimotor median mononeuropathy across the wrist (I.e. Carpal   tunnel syndrome).  There is no motor axonal loss.  There is no   active denervation.  This is graded as Moderate in severity   bilaterally.  When compared to the prior study from 2/2020, there   is no longer a decrease in left median motor amplitude,   suggesting interval improvement.  The latencies are mildly   increased compared to the prior study.     3. On today's study, there is also slight decrease in left radial   sensory amplitude which was not present prior.  In the context of   her known peripheral polyneuropathy, this is suggestive of   peripheral polyneuropathy affecting the upper extremities.  Some   of the slowing at the wrist of the median and ulnar nerves may be   confounded by this peripheral polyneuropathy.             Assessment:       Encounter Diagnoses   Name Primary?    Weakness of right hand Yes    Cubital tunnel syndrome on right     Ulnar nerve compression at multiple levels, right           Plan:       Sincere was seen today for pain.    Diagnoses and all orders for this visit:    Weakness of right hand    Cubital tunnel syndrome on right    Ulnar nerve compression at multiple levels, right  -     MRI Forearm W WO Contrast Right; Future      The patient continues to have severe compression symptoms  within the right hand.  Her EMG demonstrated failure to improve and active axonal loss within ulnar nerve of the right upper extremity.  There is ample to improvement within the forearm due to the previous surgery however she has had failure to improve in symptoms.  This case has been discussed with Dr. Joyce.  Together based on these findings I would like to obtain an MRI to assess possible compression of the ulnar nerve through the forearm.  I discussed if a area of compression is not found an MRI I would have no other choice but to explore the entire length of the ulnar nerve from the mid arm to the Guyon's canal the right upper extremity.  The patient understood this.  She would like to get some relief as soon as possible.  I would like to see her back after the MRI is performed discuss results.

## 2020-12-29 ENCOUNTER — HOSPITAL ENCOUNTER (OUTPATIENT)
Dept: RADIOLOGY | Facility: HOSPITAL | Age: 60
Discharge: HOME | End: 2020-12-29
Attending: PLASTIC SURGERY
Payer: COMMERCIAL

## 2020-12-29 DIAGNOSIS — G56.21 ULNAR NERVE COMPRESSION AT MULTIPLE LEVELS, RIGHT: ICD-10-CM

## 2020-12-30 ENCOUNTER — HOSPITAL ENCOUNTER (OUTPATIENT)
Dept: RADIOLOGY | Facility: HOSPITAL | Age: 60
Discharge: HOME | End: 2020-12-30
Attending: PLASTIC SURGERY
Payer: COMMERCIAL

## 2020-12-30 PROCEDURE — 73220 MRI UPPR EXTREMITY W/O&W/DYE: CPT | Mod: 26,RT,, | Performed by: RADIOLOGY

## 2020-12-30 PROCEDURE — 73220 MRI UPPR EXTREMITY W/O&W/DYE: CPT | Mod: TC,RT

## 2020-12-30 PROCEDURE — 73220 MRI FOREARM W WO CONTRAST RIGHT: ICD-10-PCS | Mod: 26,RT,, | Performed by: RADIOLOGY

## 2020-12-30 PROCEDURE — 25500020 PHARM REV CODE 255: Performed by: PLASTIC SURGERY

## 2020-12-30 PROCEDURE — A9585 GADOBUTROL INJECTION: HCPCS | Performed by: PLASTIC SURGERY

## 2020-12-30 RX ORDER — GADOBUTROL 604.72 MG/ML
6 INJECTION INTRAVENOUS
Status: COMPLETED | OUTPATIENT
Start: 2020-12-30 | End: 2020-12-30

## 2020-12-30 RX ADMIN — GADOBUTROL 6 ML: 604.72 INJECTION INTRAVENOUS at 04:12

## 2021-01-13 ENCOUNTER — PATIENT MESSAGE (OUTPATIENT)
Dept: NEUROLOGY | Facility: CLINIC | Age: 61
End: 2021-01-13

## 2021-07-23 ENCOUNTER — OFFICE VISIT (OUTPATIENT)
Dept: ORTHOPEDICS | Facility: CLINIC | Age: 61
End: 2021-07-23
Payer: COMMERCIAL

## 2021-07-23 VITALS — WEIGHT: 128 LBS | BODY MASS INDEX: 21.85 KG/M2 | HEIGHT: 64 IN

## 2021-07-23 DIAGNOSIS — G62.9 POLYNEUROPATHY: ICD-10-CM

## 2021-07-23 DIAGNOSIS — M77.01 MEDIAL EPICONDYLITIS OF ELBOW, RIGHT: ICD-10-CM

## 2021-07-23 DIAGNOSIS — R29.898 BILATERAL ARM WEAKNESS: Primary | ICD-10-CM

## 2021-07-23 DIAGNOSIS — G56.21 ULNAR NERVE COMPRESSION AT MULTIPLE LEVELS, RIGHT: ICD-10-CM

## 2021-07-23 PROCEDURE — 99999 PR PBB SHADOW E&M-EST. PATIENT-LVL III: ICD-10-PCS | Mod: PBBFAC,,, | Performed by: PLASTIC SURGERY

## 2021-07-23 PROCEDURE — 99214 OFFICE O/P EST MOD 30 MIN: CPT | Mod: S$GLB,,, | Performed by: PLASTIC SURGERY

## 2021-07-23 PROCEDURE — 99999 PR PBB SHADOW E&M-EST. PATIENT-LVL III: CPT | Mod: PBBFAC,,, | Performed by: PLASTIC SURGERY

## 2021-07-23 PROCEDURE — 3008F PR BODY MASS INDEX (BMI) DOCUMENTED: ICD-10-PCS | Mod: CPTII,S$GLB,, | Performed by: PLASTIC SURGERY

## 2021-07-23 PROCEDURE — 1125F AMNT PAIN NOTED PAIN PRSNT: CPT | Mod: CPTII,S$GLB,, | Performed by: PLASTIC SURGERY

## 2021-07-23 PROCEDURE — 99214 PR OFFICE/OUTPT VISIT, EST, LEVL IV, 30-39 MIN: ICD-10-PCS | Mod: S$GLB,,, | Performed by: PLASTIC SURGERY

## 2021-07-23 PROCEDURE — 1125F PR PAIN SEVERITY QUANTIFIED, PAIN PRESENT: ICD-10-PCS | Mod: CPTII,S$GLB,, | Performed by: PLASTIC SURGERY

## 2021-07-23 PROCEDURE — 3008F BODY MASS INDEX DOCD: CPT | Mod: CPTII,S$GLB,, | Performed by: PLASTIC SURGERY

## 2021-09-21 ENCOUNTER — PATIENT MESSAGE (OUTPATIENT)
Dept: NEUROLOGY | Facility: CLINIC | Age: 61
End: 2021-09-21

## 2021-09-21 ENCOUNTER — TELEPHONE (OUTPATIENT)
Dept: NEUROLOGY | Facility: CLINIC | Age: 61
End: 2021-09-21

## 2021-10-12 ENCOUNTER — PROCEDURE VISIT (OUTPATIENT)
Dept: PHYSICAL MEDICINE AND REHAB | Facility: CLINIC | Age: 61
End: 2021-10-12
Payer: COMMERCIAL

## 2021-10-12 DIAGNOSIS — G56.21 ULNAR NERVE COMPRESSION AT MULTIPLE LEVELS, RIGHT: ICD-10-CM

## 2021-10-12 DIAGNOSIS — R29.898 BILATERAL ARM WEAKNESS: ICD-10-CM

## 2021-10-12 DIAGNOSIS — G62.9 POLYNEUROPATHY: ICD-10-CM

## 2021-10-12 PROCEDURE — 95911 PR NERVE CONDUCTION STUDY; 9-10 STUDIES: ICD-10-PCS | Mod: S$GLB,,, | Performed by: PHYSICAL MEDICINE & REHABILITATION

## 2021-10-12 PROCEDURE — 95911 NRV CNDJ TEST 9-10 STUDIES: CPT | Mod: S$GLB,,, | Performed by: PHYSICAL MEDICINE & REHABILITATION

## 2021-10-12 PROCEDURE — 95886 PR EMG COMPLETE, W/ NERVE CONDUCTION STUDIES, 5+ MUSCLES: ICD-10-PCS | Mod: S$GLB,,, | Performed by: PHYSICAL MEDICINE & REHABILITATION

## 2021-10-12 PROCEDURE — 95886 MUSC TEST DONE W/N TEST COMP: CPT | Mod: S$GLB,,, | Performed by: PHYSICAL MEDICINE & REHABILITATION

## 2021-11-09 ENCOUNTER — TELEPHONE (OUTPATIENT)
Dept: ORTHOPEDICS | Facility: CLINIC | Age: 61
End: 2021-11-09
Payer: COMMERCIAL

## 2021-11-10 ENCOUNTER — OFFICE VISIT (OUTPATIENT)
Dept: ORTHOPEDICS | Facility: CLINIC | Age: 61
End: 2021-11-10
Payer: COMMERCIAL

## 2021-11-10 VITALS — HEIGHT: 64 IN | BODY MASS INDEX: 21.85 KG/M2 | WEIGHT: 128 LBS

## 2021-11-10 DIAGNOSIS — G56.23 ULNAR NEUROPATHY OF BOTH UPPER EXTREMITIES: ICD-10-CM

## 2021-11-10 DIAGNOSIS — G62.9 POLYNEUROPATHY: Primary | ICD-10-CM

## 2021-11-10 PROCEDURE — 3008F BODY MASS INDEX DOCD: CPT | Mod: CPTII,S$GLB,, | Performed by: PLASTIC SURGERY

## 2021-11-10 PROCEDURE — 1159F PR MEDICATION LIST DOCUMENTED IN MEDICAL RECORD: ICD-10-PCS | Mod: CPTII,S$GLB,, | Performed by: PLASTIC SURGERY

## 2021-11-10 PROCEDURE — 1159F MED LIST DOCD IN RCRD: CPT | Mod: CPTII,S$GLB,, | Performed by: PLASTIC SURGERY

## 2021-11-10 PROCEDURE — 99213 PR OFFICE/OUTPT VISIT, EST, LEVL III, 20-29 MIN: ICD-10-PCS | Mod: S$GLB,,, | Performed by: PLASTIC SURGERY

## 2021-11-10 PROCEDURE — 99999 PR PBB SHADOW E&M-EST. PATIENT-LVL III: CPT | Mod: PBBFAC,,, | Performed by: PLASTIC SURGERY

## 2021-11-10 PROCEDURE — 3008F PR BODY MASS INDEX (BMI) DOCUMENTED: ICD-10-PCS | Mod: CPTII,S$GLB,, | Performed by: PLASTIC SURGERY

## 2021-11-10 PROCEDURE — 99213 OFFICE O/P EST LOW 20 MIN: CPT | Mod: S$GLB,,, | Performed by: PLASTIC SURGERY

## 2021-11-10 PROCEDURE — 99999 PR PBB SHADOW E&M-EST. PATIENT-LVL III: ICD-10-PCS | Mod: PBBFAC,,, | Performed by: PLASTIC SURGERY

## 2021-12-01 ENCOUNTER — PATIENT MESSAGE (OUTPATIENT)
Dept: ORTHOPEDICS | Facility: CLINIC | Age: 61
End: 2021-12-01
Payer: COMMERCIAL

## 2021-12-01 ENCOUNTER — TELEPHONE (OUTPATIENT)
Dept: ORTHOPEDICS | Facility: CLINIC | Age: 61
End: 2021-12-01
Payer: COMMERCIAL

## 2024-03-25 DIAGNOSIS — R80.8 OTHER PROTEINURIA: ICD-10-CM

## 2024-03-25 DIAGNOSIS — R31.9 HEMATURIA: Primary | ICD-10-CM

## 2024-03-28 ENCOUNTER — HOSPITAL ENCOUNTER (OUTPATIENT)
Dept: RADIOLOGY | Facility: HOSPITAL | Age: 64
Discharge: HOME | End: 2024-03-28
Attending: FAMILY MEDICINE
Payer: COMMERCIAL

## 2024-03-28 DIAGNOSIS — R80.8 OTHER PROTEINURIA: ICD-10-CM

## 2024-03-28 DIAGNOSIS — R31.9 HEMATURIA: ICD-10-CM

## 2024-03-28 PROCEDURE — 76775 US EXAM ABDO BACK WALL LIM: CPT | Mod: 26,,, | Performed by: RADIOLOGY

## 2024-03-28 PROCEDURE — 76775 US EXAM ABDO BACK WALL LIM: CPT | Mod: TC,PN

## 2024-06-19 ENCOUNTER — HOSPITAL ENCOUNTER (OUTPATIENT)
Dept: RADIOLOGY | Facility: HOSPITAL | Age: 64
Discharge: HOME | End: 2024-06-19
Attending: FAMILY MEDICINE
Payer: COMMERCIAL

## 2024-06-19 DIAGNOSIS — R60.0 LEG EDEMA, LEFT: ICD-10-CM

## 2024-06-19 DIAGNOSIS — R60.0 LEG EDEMA, LEFT: Primary | ICD-10-CM

## 2024-06-19 PROCEDURE — 93926 LOWER EXTREMITY STUDY: CPT | Mod: TC,PN,LT

## 2024-06-19 PROCEDURE — 93971 EXTREMITY STUDY: CPT | Mod: TC,PN,LT

## 2024-06-19 PROCEDURE — 93926 LOWER EXTREMITY STUDY: CPT | Mod: 26,LT,, | Performed by: RADIOLOGY

## 2024-06-19 PROCEDURE — 93971 EXTREMITY STUDY: CPT | Mod: 26,LT,, | Performed by: RADIOLOGY

## (undated) DEVICE — UNDERGLOVES BIOGEL PI SIZE 8

## (undated) DEVICE — STRIP STERI REIN CLSR 1/2X2IN

## (undated) DEVICE — DRESSING XEROFORM FOIL PK 1X8

## (undated) DEVICE — PAD UNDERPAD 30X30

## (undated) DEVICE — ELECTRODE NEEDLE 1IN

## (undated) DEVICE — FORCEP BIPOLAR ADSON 1MM TIP

## (undated) DEVICE — DRAPE STERI-DRAPE 1000 17X11IN

## (undated) DEVICE — PAD CAST SPECIALIST STRL 6

## (undated) DEVICE — NDL HYPO REG 25G X 1 1/2

## (undated) DEVICE — PAD CAST SPECIALIST STRL 4

## (undated) DEVICE — DRESSING XEROFORM 1X8IN

## (undated) DEVICE — CLOSURE SKIN STERI STRIP 1/2X4

## (undated) DEVICE — SYR B-D DISP CONTROL 10CC100/C

## (undated) DEVICE — SUT VICRYL 3-0 27 SH

## (undated) DEVICE — SEE MEDLINE ITEM 152515

## (undated) DEVICE — BUCKET PLASTER DISPOSABLE

## (undated) DEVICE — SPONGE COTTON TRAY 4X4IN

## (undated) DEVICE — APPLICATOR CHLORAPREP ORN 26ML

## (undated) DEVICE — ELECTRODE REM PLYHSV RETURN 9

## (undated) DEVICE — GAUSE CONFORM ELASTOMULL 4IN

## (undated) DEVICE — SPLINT FIBERGLASS PAD 4X15

## (undated) DEVICE — TOURNIQUET SB QC DP 18X4IN

## (undated) DEVICE — BANDAGE ACE ELASTIC 6"

## (undated) DEVICE — GAUZE SPONGE 4X4 12PLY

## (undated) DEVICE — GLOVE BIOGEL SKINSENSE PI 7.5

## (undated) DEVICE — SUT MONOCRYL PLUS 4-0 P3

## (undated) DEVICE — SPLINTING ORTHO GLASS1 X15FT

## (undated) DEVICE — PACK UPPER EXTREMITY BAPTIST

## (undated) DEVICE — SLING ARM MEDIUM FOAM STRAP

## (undated) DEVICE — SUT 4/0 18IN ETHILON BL P3

## (undated) DEVICE — BANDAGE DERMACEA STRETCH 4X1IN

## (undated) DEVICE — SEE MEDLINE ITEM 146308

## (undated) DEVICE — SLING ARM MEDIUM

## (undated) DEVICE — SOL 9P NACL IRR PIC IL

## (undated) DEVICE — LOOP VESSEL BLUE MAXI

## (undated) DEVICE — DRAPE STERI U-SHAPED 47X51IN

## (undated) DEVICE — PAD ABD 8X10 STERILE

## (undated) DEVICE — SEE MEDLINE ITEM 146231